# Patient Record
Sex: FEMALE | Race: BLACK OR AFRICAN AMERICAN | Employment: FULL TIME | URBAN - METROPOLITAN AREA
[De-identification: names, ages, dates, MRNs, and addresses within clinical notes are randomized per-mention and may not be internally consistent; named-entity substitution may affect disease eponyms.]

---

## 2017-02-27 ENCOUNTER — HOSPITAL ENCOUNTER (OUTPATIENT)
Dept: LAB | Age: 52
Discharge: HOME | End: 2017-02-27
Payer: COMMERCIAL

## 2017-02-27 LAB
ALBUMIN SERPL BCP-MCNC: 3.8 G/DL (ref 3.4–5)
ALBUMIN/GLOB SERPL: 1.1 {RATIO} (ref 0.8–1.7)
ALP SERPL-CCNC: 104 U/L (ref 45–117)
ALT SERPL-CCNC: 20 U/L (ref 13–56)
ANION GAP BLD CALC-SCNC: 8 MMOL/L (ref 3–18)
AST SERPL W P-5'-P-CCNC: 14 U/L (ref 15–37)
BILIRUB SERPL-MCNC: 0.3 MG/DL (ref 0.2–1)
BUN SERPL-MCNC: 11 MG/DL (ref 7–18)
BUN/CREAT SERPL: 16 (ref 12–20)
CALCIUM SERPL-MCNC: 9.3 MG/DL (ref 8.5–10.1)
CHLORIDE SERPL-SCNC: 105 MMOL/L (ref 100–108)
CK SERPL-CCNC: 127 U/L (ref 26–192)
CO2 SERPL-SCNC: 27 MMOL/L (ref 21–32)
CREAT SERPL-MCNC: 0.68 MG/DL (ref 0.6–1.3)
EST. AVERAGE GLUCOSE BLD GHB EST-MCNC: 126 MG/DL
GLOBULIN SER CALC-MCNC: 3.5 G/DL (ref 2–4)
GLUCOSE SERPL-MCNC: 73 MG/DL (ref 74–99)
HBA1C MFR BLD: 6 % (ref 4.2–5.6)
POTASSIUM SERPL-SCNC: 4.3 MMOL/L (ref 3.5–5.5)
PROT SERPL-MCNC: 7.3 G/DL (ref 6.4–8.2)
SODIUM SERPL-SCNC: 140 MMOL/L (ref 136–145)

## 2017-02-27 PROCEDURE — 82550 ASSAY OF CK (CPK): CPT | Performed by: FAMILY MEDICINE

## 2017-02-27 PROCEDURE — 80053 COMPREHEN METABOLIC PANEL: CPT | Performed by: FAMILY MEDICINE

## 2017-02-27 PROCEDURE — 83036 HEMOGLOBIN GLYCOSYLATED A1C: CPT | Performed by: FAMILY MEDICINE

## 2017-02-27 PROCEDURE — 36415 COLL VENOUS BLD VENIPUNCTURE: CPT | Performed by: FAMILY MEDICINE

## 2017-02-27 PROCEDURE — 83704 LIPOPROTEIN BLD QUAN PART: CPT | Performed by: FAMILY MEDICINE

## 2017-03-01 ENCOUNTER — OFFICE VISIT (OUTPATIENT)
Dept: FAMILY MEDICINE CLINIC | Age: 52
End: 2017-03-01

## 2017-03-01 VITALS
HEIGHT: 62 IN | BODY MASS INDEX: 42.1 KG/M2 | TEMPERATURE: 98.9 F | HEART RATE: 59 BPM | DIASTOLIC BLOOD PRESSURE: 72 MMHG | SYSTOLIC BLOOD PRESSURE: 127 MMHG | RESPIRATION RATE: 20 BRPM | WEIGHT: 228.75 LBS

## 2017-03-01 DIAGNOSIS — E87.6 HYPOKALEMIA: ICD-10-CM

## 2017-03-01 DIAGNOSIS — N39.0 URINARY TRACT INFECTION, SITE UNSPECIFIED: ICD-10-CM

## 2017-03-01 DIAGNOSIS — Z01.419 WELL WOMAN EXAM WITH ROUTINE GYNECOLOGICAL EXAM: Primary | ICD-10-CM

## 2017-03-01 DIAGNOSIS — R73.03 PREDIABETES: ICD-10-CM

## 2017-03-01 DIAGNOSIS — Z51.81 MEDICATION MONITORING ENCOUNTER: ICD-10-CM

## 2017-03-01 DIAGNOSIS — N39.0 URINARY TRACT INFECTION WITHOUT HEMATURIA, SITE UNSPECIFIED: ICD-10-CM

## 2017-03-01 DIAGNOSIS — E55.9 VITAMIN D DEFICIENCY: ICD-10-CM

## 2017-03-01 DIAGNOSIS — Z12.11 SCREEN FOR COLON CANCER: ICD-10-CM

## 2017-03-01 DIAGNOSIS — I10 ESSENTIAL HYPERTENSION: ICD-10-CM

## 2017-03-01 DIAGNOSIS — Z23 ENCOUNTER FOR IMMUNIZATION: ICD-10-CM

## 2017-03-01 DIAGNOSIS — E78.5 HYPERLIPIDEMIA, UNSPECIFIED HYPERLIPIDEMIA TYPE: ICD-10-CM

## 2017-03-01 LAB
BILIRUB UR QL STRIP: NEGATIVE
GLUCOSE UR-MCNC: NEGATIVE MG/DL
HEMOCCULT STL QL: NEGATIVE
KETONES P FAST UR STRIP-MCNC: NEGATIVE MG/DL
PH UR STRIP: 7.5 [PH] (ref 4.6–8)
PROT UR QL STRIP: NORMAL MG/DL
SP GR UR STRIP: 1.01 (ref 1–1.03)
UA UROBILINOGEN AMB POC: NORMAL (ref 0.2–1)
URINALYSIS CLARITY POC: CLEAR
URINALYSIS COLOR POC: YELLOW
URINE BLOOD POC: NORMAL
URINE LEUKOCYTES POC: NORMAL
URINE NITRITES POC: NEGATIVE
VALID INTERNAL CONTROL?: YES

## 2017-03-01 NOTE — PROGRESS NOTES
Chief Complaint   Patient presents with    Well Woman     pap    Results     discuss lab results       Health Maintenance reviewed    1. Have you been to the ER, urgent care clinic since your last visit? Hospitalized since your last visit? Yes When: 12/16 Where: [facility] Reason for visit: N/V    2. Have you seen or consulted any other health care providers outside of the [address] [place] [address] since your last visit? Include any pap smears or colon screening.  No

## 2017-03-01 NOTE — MR AVS SNAPSHOT
Visit Information Date & Time Provider Department Dept. Phone Encounter #  
 3/1/2017  9:30 AM Mark Naranjo MD [address] [id number] Follow-up Instructions Return in about 3 months (around 6/1/2017). Upcoming Health Maintenance Date Due DTaP/Tdap/Td series (1 - Tdap) 1/22/1986 FOBT Q 1 YEAR AGE 50-75 1/22/2015 PAP AKA CERVICAL CYTOLOGY 11/1/2016 BREAST CANCER SCRN MAMMOGRAM 12/5/2018 Allergies as of 3/1/2017  Review Complete On: 3/1/2017 By: Mark Naranjo MD  
  
 Severity Noted Reaction Type Reactions Hydrochlorothiazide   Side Effect Other (comments) TINGLING IN BACK OF HEAD Norvasc [Amlodipine]  05/24/2010    Other (comments) Tingling in back of head Current Immunizations  Reviewed on 11/3/2016 Name Date Influenza Vaccine 11/3/2015 Influenza Vaccine (Quad) PF 11/11/2016 Influenza Vaccine PF 11/22/2013 Influenza Vaccine Split 10/26/2012, 9/26/2011 Not reviewed this visit You Were Diagnosed With   
  
 Codes Comments Well woman exam with routine gynecological exam    -  Primary ICD-10-CM: M94.079 ICD-9-CM: V72.31 Screen for colon cancer     ICD-10-CM: Z12.11 ICD-9-CM: V76.51 Essential hypertension     ICD-10-CM: I10 
ICD-9-CM: 401.9 Vitamin D deficiency     ICD-10-CM: E55.9 ICD-9-CM: 268.9 Prediabetes     ICD-10-CM: R73.03 
ICD-9-CM: 790.29 Hyperlipidemia, unspecified hyperlipidemia type     ICD-10-CM: E78.5 ICD-9-CM: 272.4 Hypokalemia     ICD-10-CM: E87.6 ICD-9-CM: 276.8 Medication monitoring encounter     ICD-10-CM: Z51.81 
ICD-9-CM: V58.83 Vitals BP  
  
  
  
  
  
 127/72 (BP 1 Location: Left arm, BP Patient Position: Sitting) BMI and BSA Data Body Mass Index Body Surface Area  
 41.84 kg/m 2 2.13 m 2 Preferred Pharmacy Pharmacy Name Phone [id number], [address] [facility] [facility] [phone] Your Updated Medication List  
  
   
This list is accurate as of: 3/1/17 10:43 AM.  Always use your most recent med list.  
  
  
  
  
 citalopram 10 mg tablet Commonly known as:  Ira Claudy Take 0.5 Tabs by mouth daily. CITRACAL + D PO Take  by mouth daily. losartan 100 mg tablet Commonly known as:  COZAAR  
TAKE 1 TAB BY MOUTH DAILY. MULTIVITAMIN PO Take 1 Tab by mouth daily. pravastatin 40 mg tablet Commonly known as:  PRAVACHOL  
TAKE 1 TAB BY MOUTH NIGHTLY. VITAMIN D3 2,000 unit Cap capsule Generic drug:  Cholecalciferol (Vitamin D3) 1 cap daily We Performed the Following AMB POC FECAL BLOOD, OCCULT, QL 1 CARD [91296 CPT(R)] Follow-up Instructions Return in about 3 months (around 6/1/2017). To-Do List   
 03/01/2017 Lab:  OCCULT BLOOD, IMMUNOASSAY (FIT)   
  
 03/01/2017 Pathology:  PAP, IG, RFX HPV ASCUS ([id number])   
  
 06/01/2017 Lab:  CBC WITH AUTOMATED DIFF   
  
 06/01/2017 Lab:  HEMOGLOBIN A1C W/O EAG   
  
 06/01/2017 Lab:  LIPID PANEL   
  
 06/01/2017 Lab:  MAGNESIUM   
  
 06/01/2017 Lab:  METABOLIC PANEL, COMPREHENSIVE   
  
 06/01/2017 Lab:  TSH 3RD GENERATION   
  
 06/01/2017 Lab:  VITAMIN B12   
  
 06/01/2017 Lab:  VITAMIN D, 25 HYDROXY Patient Instructions Please contact our office if you have any questions about your visit today. Introducing [facility]! Dear Moy Phillip: 
Thank you for requesting a [employer] account. Our records indicate that you have previously registered for a [employer] account but its currently inactive. Please call our [employer] support line at [phone]. Additional Information If you have questions, please visit the Frequently Asked Questions section of the [employer] website at [email or web address] Remember, [employer] is NOT to be used for urgent needs. For medical emergencies, dial 911. Now available from your iPhone and Android! Please provide this summary of care documentation to your next provider. Your primary care clinician is listed as CHRISTINE DE LUNA. If you have any questions after today's visit, please call [phone].

## 2017-03-01 NOTE — PROGRESS NOTES
HISTORY OF PRESENT ILLNESS  Josef Guardado is a 46 y.o. female. Chief Complaint   Patient presents with             Results     discuss lab results   follow up htn chronic problem, stable  follow up prediabetes chronic problem, stable no meds on diet for this   follow up hyperlipidemia chronic problem, stable   Also pt went to ED with uti and was treated, elissas inocencio  HPI  Past Medical History:   Diagnosis Date    Allergy     Anxiety 4/24/2010    Essential hypertension     HTN (hypertension) 4/24/2010    Hyperlipemia 4/24/2010    Hypokalemia 4/24/2010    Scoliosis      Current Outpatient Prescriptions   Medication Sig Dispense Refill    pravastatin (PRAVACHOL) 40 mg tablet TAKE 1 TAB BY MOUTH NIGHTLY. 90 Tab 3    citalopram (CELEXA) 10 mg tablet Take 0.5 Tabs by mouth daily. 45 Tab 3    losartan (COZAAR) 100 mg tablet TAKE 1 TAB BY MOUTH DAILY. 90 Tab 3    Cholecalciferol, Vitamin D3, (VITAMIN D3) 2,000 unit cap 1 cap daily   30 Cap prn    CALCIUM CITRATE/VITAMIN D3 (CITRACAL + D PO) Take  by mouth daily.  MULTIVITAMINS (MULTIVITAMIN PO) Take 1 Tab by mouth daily. Allergies   Allergen Reactions    Hydrochlorothiazide Other (comments)     TINGLING IN BACK OF HEAD    Norvasc [Amlodipine] Other (comments)     Tingling in back of head       ROS Respiratory: Negative for shortness of breath. Cardiovascular: Negative for chest pain. Genitourinary: Negative for frequency. Neurological: Negative for dizziness and headaches. Visit Vitals    /72 (BP 1 Location: Left arm, BP Patient Position: Sitting)    Pulse (!) 59    Temp 98.9 °F (37.2 °C) (Oral)    Resp 20    Ht 5' 2\" (1.575 m)    Wt 228 lb 12 oz (103.8 kg)    BMI 41.84 kg/m2       Physical Exam  Nursing note and vitals reviewed. Constitutional: She is oriented to person, place, and time. She appears well-developed and well-nourished. No distress.    HENT:   Mouth/Throat: Oropharynx is clear and moist.   Neck: No JVD present. No thyromegaly present. Cardiovascular: Normal rate, regular rhythm and normal heart sounds. Pulmonary/Chest: Effort normal and breath sounds normal. No respiratory distress. She has no wheezes. She has no rales. Musculoskeletal: She exhibits no edema. Lymphadenopathy:     She has no cervical adenopathy. Neurological: She is alert and oriented to person, place, and time. Coordination normal.   Psychiatric: She has a normal mood and affect. Her behavior is normal.    Results for orders placed or performed during the hospital encounter of 02/27/17   CK   Result Value Ref Range     26 - 657 U/L   METABOLIC PANEL, COMPREHENSIVE   Result Value Ref Range    Sodium 140 136 - 145 mmol/L    Potassium 4.3 3.5 - 5.5 mmol/L    Chloride 105 100 - 108 mmol/L    CO2 27 21 - 32 mmol/L    Anion gap 8 3.0 - 18 mmol/L    Glucose 73 (L) 74 - 99 mg/dL    BUN 11 7.0 - 18 MG/DL    Creatinine 0.68 0.6 - 1.3 MG/DL    BUN/Creatinine ratio 16 12 - 20      GFR est AA >60 >60 ml/min/1.73m2    GFR est non-AA >60 >60 ml/min/1.73m2    Calcium 9.3 8.5 - 10.1 MG/DL    Bilirubin, total 0.3 0.2 - 1.0 MG/DL    ALT (SGPT) 20 13 - 56 U/L    AST (SGOT) 14 (L) 15 - 37 U/L    Alk.  phosphatase 104 45 - 117 U/L    Protein, total 7.3 6.4 - 8.2 g/dL    Albumin 3.8 3.4 - 5.0 g/dL    Globulin 3.5 2.0 - 4.0 g/dL    A-G Ratio 1.1 0.8 - 1.7     HEMOGLOBIN A1C   Result Value Ref Range    Hemoglobin A1c 6.0 (H) 4.2 - 5.6 %    Est. average glucose 126 mg/dL       ASSESSMENT and PLAN    ICD-10-CM ICD-9-CM                              3. Essential hypertension stable continue current medications  T09 148.9 METABOLIC PANEL, COMPREHENSIVE      CBC WITH AUTOMATED DIFF      VITAMIN B12      TSH 3RD GENERATION      MAGNESIUM   4. Vitamin D deficiency Check labs on follow up   J92.4 433.2 METABOLIC PANEL, COMPREHENSIVE      CBC WITH AUTOMATED DIFF      VITAMIN B12      TSH 3RD GENERATION      MAGNESIUM      VITAMIN D, 25 HYDROXY   5. Prediabetes Stable, continue current care. Check labs on follow up  Dm risk B28.57 951.37 METABOLIC PANEL, COMPREHENSIVE      CBC WITH AUTOMATED DIFF      VITAMIN B12      TSH 3RD GENERATION      MAGNESIUM      HEMOGLOBIN A1C W/O EAG   6. Hyperlipidemia, unspecified hyperlipidemia type Check labs on follow up   E78.5 272.4 LIPID PANEL      METABOLIC PANEL, COMPREHENSIVE      CBC WITH AUTOMATED DIFF      VITAMIN B12      TSH 3RD GENERATION      MAGNESIUM   7. Hypokalemia stable continue current medications  K58.9 401.3 METABOLIC PANEL, COMPREHENSIVE      CBC WITH AUTOMATED DIFF      VITAMIN B12      TSH 3RD GENERATION      MAGNESIUM   8. Medication monitoring encounter Z51.81 V58.83 LIPID PANEL      METABOLIC PANEL, COMPREHENSIVE      CBC WITH AUTOMATED DIFF      VITAMIN B12      TSH 3RD GENERATION      MAGNESIUM      VITAMIN D, 25 HYDROXY      HEMOGLOBIN A1C W/O EAG     Subjective:   46 y.o. female for Well Woman Check. No LMP recorded. Patient has had a hysterectomy. Social History: . Pertinent past medical hstory: . Current Outpatient Prescriptions   Medication Sig Dispense Refill    pravastatin (PRAVACHOL) 40 mg tablet TAKE 1 TAB BY MOUTH NIGHTLY. 90 Tab 3    citalopram (CELEXA) 10 mg tablet Take 0.5 Tabs by mouth daily. 45 Tab 3    losartan (COZAAR) 100 mg tablet TAKE 1 TAB BY MOUTH DAILY. 90 Tab 3    Cholecalciferol, Vitamin D3, (VITAMIN D3) 2,000 unit cap 1 cap daily   30 Cap prn    CALCIUM CITRATE/VITAMIN D3 (CITRACAL + D PO) Take  by mouth daily.  MULTIVITAMINS (MULTIVITAMIN PO) Take 1 Tab by mouth daily.        Allergies   Allergen Reactions    Hydrochlorothiazide Other (comments)     TINGLING IN BACK OF HEAD    Norvasc [Amlodipine] Other (comments)     Tingling in back of head     Past Medical History:   Diagnosis Date    Allergy     Anxiety 4/24/2010    Essential hypertension     HTN (hypertension) 4/24/2010    Hyperlipemia 4/24/2010    Hypokalemia 4/24/2010    Scoliosis      Past Surgical History:   Procedure Laterality Date    HX APPENDECTOMY  1979    HX HYSTERECTOMY  05/07/08    partial     Family History   Problem Relation Age of Onset    Hypertension Mother     Glaucoma Mother     Hypertension Father     Stroke Brother     Heart Disease Maternal Aunt      chf    Heart Failure Maternal Aunt      MI    Diabetes Maternal Uncle     Heart Failure Other      MI    Stroke Other     Colon Cancer Other      Social History   Substance Use Topics    Smoking status: Never Smoker    Smokeless tobacco: Never Used    Alcohol use No        ROS:  Feeling well. No dyspnea or chest pain on exertion. No abdominal pain, change in bowel habits, black or bloody stools. No urinary tract symptoms. GYN ROS: no breast pain or new or enlarging lumps on self exam. No neurological complaints. Objective:     Visit Vitals    /72 (BP 1 Location: Left arm, BP Patient Position: Sitting)    Pulse (!) 59    Temp 98.9 °F (37.2 °C) (Oral)    Resp 20    Ht 5' 2\" (1.575 m)    Wt 228 lb 12 oz (103.8 kg)    BMI 41.84 kg/m2     The patient appears well, alert, oriented x 3, in no distress. ENT normal.  Neck supple. No adenopathy or thyromegaly. RHONDA. Lungs are clear, good air entry, no wheezes, rhonchi or rales. S1 and S2 normal, no murmurs, regular rate and rhythm. Abdomen soft without tenderness, guarding, mass or organomegaly. Extremities show no edema, normal peripheral pulses. Neurological is normal, no focal findings.     BREAST EXAM: breasts appear normal, no suspicious masses, no skin or nipple changes or axillary nodes    PELVIC EXAM: VULVA: normal appearing vulva with no masses, tenderness or lesions, VAGINA: normal appearing vagina with normal color and discharge, no lesions, CERVIX: surgically absent, UTERUS: surgically absent, vaginal cuff well healed, RECTAL: normal rectal, no masses, guaiac negative stool obtained, PAP: Pap smear done today    Assessment/Plan:   well woman  mammogram  pap smear    ICD-10-CM ICD-9-CM    1. Well woman exam with routine gynecological exam   S/p hys no cervix, no cancer hx, no  Need for further pap Z01.419 V72.31 PAP, IG, RFX HPV ASCUS ([id number])      AMB POC FECAL BLOOD, OCCULT, QL 1 CARD   2.  Screen for colon cancer Z12.11 V76.51 AMB POC FECAL BLOOD, OCCULT, QL 1 CARD      OCCULT BLOOD, IMMUNOASSAY (FIT)

## 2017-03-04 LAB — BACTERIA UR CULT: NORMAL

## 2017-03-07 ENCOUNTER — TELEPHONE (OUTPATIENT)
Dept: FAMILY MEDICINE CLINIC | Age: 52
End: 2017-03-07

## 2017-03-07 LAB
HPV I/H RISK 1 DNA CVX QL PROBE+SIG AMP: NORMAL
HPV I/H RISK 4 DNA CVX QL PROBE+SIG AMP: NEGATIVE

## 2017-06-02 ENCOUNTER — DOCUMENTATION ONLY (OUTPATIENT)
Dept: NEUROLOGY | Age: 52
End: 2017-06-02

## 2017-06-02 ENCOUNTER — TELEPHONE (OUTPATIENT)
Dept: NEUROLOGY | Age: 52
End: 2017-06-02

## 2017-06-02 NOTE — TELEPHONE ENCOUNTER
Pt is asking if we know of any way she can obtain a CPAP machine interim of getting established with new [employer] since she has changed insurances and had to give back CPAP to [facility].

## 2017-06-21 RX ORDER — LOSARTAN POTASSIUM 100 MG/1
TABLET ORAL
Qty: 90 TAB | Refills: 3 | Status: SHIPPED | OUTPATIENT
Start: 2017-06-21 | End: 2018-06-21 | Stop reason: SDUPTHER

## 2017-07-05 ENCOUNTER — HOSPITAL ENCOUNTER (OUTPATIENT)
Dept: LAB | Age: 52
Discharge: HOME | End: 2017-07-05
Payer: COMMERCIAL

## 2017-07-05 LAB
25(OH)D3 SERPL-MCNC: 31.4 NG/ML (ref 30–100)
ALBUMIN SERPL BCP-MCNC: 3.7 G/DL (ref 3.4–5)
ALBUMIN/GLOB SERPL: 1.2 {RATIO} (ref 0.8–1.7)
ALP SERPL-CCNC: 102 U/L (ref 45–117)
ALT SERPL-CCNC: 21 U/L (ref 13–56)
ANION GAP BLD CALC-SCNC: 7 MMOL/L (ref 3–18)
AST SERPL W P-5'-P-CCNC: 14 U/L (ref 15–37)
BASOPHILS # BLD AUTO: 0 K/UL (ref 0–0.1)
BASOPHILS # BLD: 0 % (ref 0–2)
BILIRUB SERPL-MCNC: 0.2 MG/DL (ref 0.2–1)
BUN SERPL-MCNC: 14 MG/DL (ref 7–18)
BUN/CREAT SERPL: 21 (ref 12–20)
CALCIUM SERPL-MCNC: 9.2 MG/DL (ref 8.5–10.1)
CHLORIDE SERPL-SCNC: 103 MMOL/L (ref 100–108)
CHOLEST SERPL-MCNC: 179 MG/DL
CO2 SERPL-SCNC: 29 MMOL/L (ref 21–32)
CREAT SERPL-MCNC: 0.66 MG/DL (ref 0.6–1.3)
DIFFERENTIAL METHOD BLD: ABNORMAL
EOSINOPHIL # BLD: 0.2 K/UL (ref 0–0.4)
EOSINOPHIL NFR BLD: 3 % (ref 0–5)
ERYTHROCYTE [DISTWIDTH] IN BLOOD BY AUTOMATED COUNT: 13.7 % (ref 11.6–14.5)
GLOBULIN SER CALC-MCNC: 3.2 G/DL (ref 2–4)
GLUCOSE SERPL-MCNC: 78 MG/DL (ref 74–99)
HBA1C MFR BLD: 6.7 % (ref 4.2–5.6)
HCT VFR BLD AUTO: 37.5 % (ref 35–45)
HDLC SERPL-MCNC: 64 MG/DL (ref 40–60)
HDLC SERPL: 2.8 {RATIO} (ref 0–5)
HGB BLD-MCNC: 11.9 G/DL (ref 12–16)
LDLC SERPL CALC-MCNC: 101.6 MG/DL (ref 0–100)
LIPID PROFILE,FLP: ABNORMAL
LYMPHOCYTES # BLD AUTO: 54 % (ref 21–52)
LYMPHOCYTES # BLD: 3 K/UL (ref 0.9–3.6)
MAGNESIUM SERPL-MCNC: 2.2 MG/DL (ref 1.6–2.6)
MCH RBC QN AUTO: 26.1 PG (ref 24–34)
MCHC RBC AUTO-ENTMCNC: 31.7 G/DL (ref 31–37)
MCV RBC AUTO: 82.2 FL (ref 74–97)
MONOCYTES # BLD: 0.5 K/UL (ref 0.05–1.2)
MONOCYTES NFR BLD AUTO: 8 % (ref 3–10)
NEUTS SEG # BLD: 2 K/UL (ref 1.8–8)
NEUTS SEG NFR BLD AUTO: 35 % (ref 40–73)
PLATELET # BLD AUTO: 315 K/UL (ref 135–420)
PMV BLD AUTO: 10.1 FL (ref 9.2–11.8)
POTASSIUM SERPL-SCNC: 4.6 MMOL/L (ref 3.5–5.5)
PROT SERPL-MCNC: 6.9 G/DL (ref 6.4–8.2)
RBC # BLD AUTO: 4.56 M/UL (ref 4.2–5.3)
SODIUM SERPL-SCNC: 139 MMOL/L (ref 136–145)
TRIGL SERPL-MCNC: 67 MG/DL (ref ?–150)
TSH SERPL DL<=0.05 MIU/L-ACNC: 1.77 UIU/ML (ref 0.36–3.74)
VIT B12 SERPL-MCNC: 576 PG/ML (ref 211–911)
VLDLC SERPL CALC-MCNC: 13.4 MG/DL
WBC # BLD AUTO: 5.6 K/UL (ref 4.6–13.2)

## 2017-07-05 PROCEDURE — 36415 COLL VENOUS BLD VENIPUNCTURE: CPT | Performed by: FAMILY MEDICINE

## 2017-07-05 PROCEDURE — 83735 ASSAY OF MAGNESIUM: CPT | Performed by: FAMILY MEDICINE

## 2017-07-05 PROCEDURE — 82607 VITAMIN B-12: CPT | Performed by: FAMILY MEDICINE

## 2017-07-05 PROCEDURE — 84443 ASSAY THYROID STIM HORMONE: CPT | Performed by: FAMILY MEDICINE

## 2017-07-05 PROCEDURE — 80061 LIPID PANEL: CPT | Performed by: FAMILY MEDICINE

## 2017-07-05 PROCEDURE — 83036 HEMOGLOBIN GLYCOSYLATED A1C: CPT | Performed by: FAMILY MEDICINE

## 2017-07-05 PROCEDURE — 85025 COMPLETE CBC W/AUTO DIFF WBC: CPT | Performed by: FAMILY MEDICINE

## 2017-07-05 PROCEDURE — 80053 COMPREHEN METABOLIC PANEL: CPT | Performed by: FAMILY MEDICINE

## 2017-07-05 PROCEDURE — 82306 VITAMIN D 25 HYDROXY: CPT | Performed by: FAMILY MEDICINE

## 2017-07-07 ENCOUNTER — OFFICE VISIT (OUTPATIENT)
Dept: FAMILY MEDICINE CLINIC | Age: 52
End: 2017-07-07

## 2017-07-07 VITALS
HEART RATE: 66 BPM | DIASTOLIC BLOOD PRESSURE: 70 MMHG | HEIGHT: 62 IN | BODY MASS INDEX: 44.16 KG/M2 | WEIGHT: 240 LBS | SYSTOLIC BLOOD PRESSURE: 123 MMHG | RESPIRATION RATE: 20 BRPM | TEMPERATURE: 98.5 F

## 2017-07-07 DIAGNOSIS — Z11.4 SCREENING FOR HIV (HUMAN IMMUNODEFICIENCY VIRUS): ICD-10-CM

## 2017-07-07 DIAGNOSIS — Z99.89 OSA ON CPAP: ICD-10-CM

## 2017-07-07 DIAGNOSIS — N95.1 HOT FLASHES, MENOPAUSAL: ICD-10-CM

## 2017-07-07 DIAGNOSIS — I10 ESSENTIAL HYPERTENSION: ICD-10-CM

## 2017-07-07 DIAGNOSIS — Z51.81 MEDICATION MONITORING ENCOUNTER: ICD-10-CM

## 2017-07-07 DIAGNOSIS — G47.33 OSA ON CPAP: ICD-10-CM

## 2017-07-07 DIAGNOSIS — E78.5 HYPERLIPIDEMIA, UNSPECIFIED HYPERLIPIDEMIA TYPE: Primary | ICD-10-CM

## 2017-07-07 DIAGNOSIS — R73.03 PREDIABETES: ICD-10-CM

## 2017-07-07 NOTE — PROGRESS NOTES
HISTORY OF PRESENT ILLNESS  Ayse Raines is a 46 y.o. female. Chief Complaint   Patient presents with    Hypertension chronic problem, stable Reports compliance with meds Asymptomatic, no headache or dizziness.  Vitamin D Deficiency    Blood sugar problem     Prediabetes chronic problem, stable no meds on diet for this     Cholesterol Problem chronic problem, stable      high chol    Results     discuss lab results       HPI  Past Medical History:   Diagnosis Date    Allergy     Anxiety 4/24/2010    Essential hypertension     HTN (hypertension) 4/24/2010    Hyperlipemia 4/24/2010    Hypokalemia 4/24/2010    Scoliosis      . Past Medical History:   Diagnosis Date    Allergy     Anxiety 4/24/2010    Essential hypertension     HTN (hypertension) 4/24/2010    Hyperlipemia 4/24/2010    Hypokalemia 4/24/2010    Scoliosis      Current Outpatient Prescriptions   Medication Sig Dispense Refill    losartan (COZAAR) 100 mg tablet TAKE 1 TAB BY MOUTH DAILY. 90 Tab 3    pravastatin (PRAVACHOL) 40 mg tablet TAKE 1 TAB BY MOUTH NIGHTLY. 90 Tab 3    citalopram (CELEXA) 10 mg tablet Take 0.5 Tabs by mouth daily. 45 Tab 3    Cholecalciferol, Vitamin D3, (VITAMIN D3) 2,000 unit cap 1 cap daily   30 Cap prn    CALCIUM CITRATE/VITAMIN D3 (CITRACAL + D PO) Take  by mouth daily.  MULTIVITAMINS (MULTIVITAMIN PO) Take 1 Tab by mouth daily. Allergies   Allergen Reactions    Hydrochlorothiazide Other (comments)     TINGLING IN BACK OF HEAD    Norvasc [Amlodipine] Other (comments)     Tingling in back of head       ROS Respiratory: Negative for shortness of breath. Cardiovascular: Negative for chest pain. Genitourinary: Negative for frequency. Neurological: Negative for dizziness and headaches.      Visit Vitals    /70 (BP 1 Location: Left arm, BP Patient Position: Sitting)    Pulse 66    Temp 98.5 °F (36.9 °C) (Oral)    Resp 20    Ht 5' 2\" (1.575 m)    Wt 240 lb (108.9 kg)  BMI 43.9 kg/m2       Physical Exam  Nursing note and vitals reviewed. Constitutional: She is oriented to person, place, and time. She appears well-developed and well-nourished. No distress. HENT:   Mouth/Throat: Oropharynx is clear and moist.   Neck: No JVD present. No thyromegaly present. Cardiovascular: Normal rate, regular rhythm and normal heart sounds. Pulmonary/Chest: Effort normal and breath sounds normal. No respiratory distress. She has no wheezes. She has no rales. Musculoskeletal: She exhibits no edema. Lymphadenopathy:     She has no cervical adenopathy. Neurological: She is alert and oriented to person, place, and time. Coordination normal.   Psychiatric: She has a normal mood and affect. Her behavior is normal.   Results for orders placed or performed during the hospital encounter of 07/05/17   VITAMIN B12   Result Value Ref Range    Vitamin B12 576 211 - 911 pg/mL   CBC WITH AUTOMATED DIFF   Result Value Ref Range    WBC 5.6 4.6 - 13.2 K/uL    RBC 4.56 4.20 - 5.30 M/uL    HGB 11.9 (L) 12.0 - 16.0 g/dL    HCT 37.5 35.0 - 45.0 %    MCV 82.2 74.0 - 97.0 FL    MCH 26.1 24.0 - 34.0 PG    MCHC 31.7 31.0 - 37.0 g/dL    RDW 13.7 11.6 - 14.5 %    PLATELET 154 094 - 365 K/uL    MPV 10.1 9.2 - 11.8 FL    NEUTROPHILS 35 (L) 40 - 73 %    LYMPHOCYTES 54 (H) 21 - 52 %    MONOCYTES 8 3 - 10 %    EOSINOPHILS 3 0 - 5 %    BASOPHILS 0 0 - 2 %    ABS. NEUTROPHILS 2.0 1.8 - 8.0 K/UL    ABS. LYMPHOCYTES 3.0 0.9 - 3.6 K/UL    ABS. MONOCYTES 0.5 0.05 - 1.2 K/UL    ABS. EOSINOPHILS 0.2 0.0 - 0.4 K/UL    ABS.  BASOPHILS 0.0 0.0 - 0.1 K/UL    DF AUTOMATED     LIPID PANEL   Result Value Ref Range    LIPID PROFILE          Cholesterol, total 179 <200 MG/DL    Triglyceride 67 <150 MG/DL    HDL Cholesterol 64 (H) 40 - 60 MG/DL    LDL, calculated 101.6 (H) 0 - 100 MG/DL    VLDL, calculated 13.4 MG/DL    CHOL/HDL Ratio 2.8 0 - 5.0     MAGNESIUM   Result Value Ref Range    Magnesium 2.2 1.6 - 2.6 mg/dL   METABOLIC PANEL, COMPREHENSIVE   Result Value Ref Range    Sodium 139 136 - 145 mmol/L    Potassium 4.6 3.5 - 5.5 mmol/L    Chloride 103 100 - 108 mmol/L    CO2 29 21 - 32 mmol/L    Anion gap 7 3.0 - 18 mmol/L    Glucose 78 74 - 99 mg/dL    BUN 14 7.0 - 18 MG/DL    Creatinine 0.66 0.6 - 1.3 MG/DL    BUN/Creatinine ratio 21 (H) 12 - 20      GFR est AA >60 >60 ml/min/1.73m2    GFR est non-AA >60 >60 ml/min/1.73m2    Calcium 9.2 8.5 - 10.1 MG/DL    Bilirubin, total 0.2 0.2 - 1.0 MG/DL    ALT (SGPT) 21 13 - 56 U/L    AST (SGOT) 14 (L) 15 - 37 U/L    Alk. phosphatase 102 45 - 117 U/L    Protein, total 6.9 6.4 - 8.2 g/dL    Albumin 3.7 3.4 - 5.0 g/dL    Globulin 3.2 2.0 - 4.0 g/dL    A-G Ratio 1.2 0.8 - 1.7     TSH 3RD GENERATION   Result Value Ref Range    TSH 1.77 0.36 - 3.74 uIU/mL   VITAMIN D, 25 HYDROXY   Result Value Ref Range    Vitamin D 25-Hydroxy 31.4 30 - 100 ng/mL   HEMOGLOBIN A1C W/O EAG   Result Value Ref Range    Hemoglobin A1c 6.7 (H) 4.2 - 5.6 %       ASSESSMENT and PLAN    ICD-10-CM ICD-9-CM    1. Hyperlipidemia, unspecified hyperlipidemia type stable continue current medications  Check labs on follow up    Y80.9 539.6 METABOLIC PANEL, COMPREHENSIVE      LIPID PANEL   2. Prediabetes dm risk increased work on diet Check labs on follow up   R06.90 283.80 METABOLIC PANEL, COMPREHENSIVE      HEMOGLOBIN A1C W/O EAG   3. Essential hypertension stable continue current medications  W15 185.5 METABOLIC PANEL, COMPREHENSIVE   4. Screening for HIV (human immunodeficiency virus) Z11.4 V73.89    5. Hot flashes, menopausal N95.1 627.2    6. SEFERINO on CPAP G47.33 327.23     Z99.89 V46.8    7. Medication monitoring encounter A22.52 D56.87 METABOLIC PANEL, COMPREHENSIVE      LIPID PANEL      HEMOGLOBIN A1C W/O EAG   Follow-up Disposition:  Return in about 3 months (around 10/7/2017).

## 2017-07-07 NOTE — PROGRESS NOTES
Chief Complaint   Patient presents with    Hypertension    Vitamin D Deficiency    Blood sugar problem     prediabetes    Cholesterol Problem     high chol    Results     discuss lab results       Health Maintenance Due   Topic Date Due    FOBT Q 1 YEAR AGE 50-75  01/22/2015       Health Maintenance reviewed     1. Have you been to the ER, urgent care clinic since your last visit? Hospitalized since your last visit? No    2. Have you seen or consulted any other health care providers outside of the [address] [place] [address] since your last visit? Include any pap smears or colon screening.  No

## 2017-07-07 NOTE — MR AVS SNAPSHOT
Visit Information Date & Time Provider Department Dept. Phone Encounter #  
 7/7/2017  3:00 PM Yamileth Cat MD [facility] [phone] [id number] Follow-up Instructions Return in about 3 months (around 10/7/2017). Your Appointments 7/17/2017  3:00 PM  
Follow Up with Tory Toure MD  
[facility] [address] Appt Note: follow up to obtain new CPAP machine due to insurance change  Would like to switch DME companoes [address] [facility] [address] [phone]  
  
   
 [facility] [address] Upcoming Health Maintenance Date Due FOBT Q 1 YEAR AGE 50-75 1/22/2015 INFLUENZA AGE 9 TO ADULT 8/1/2017 BREAST CANCER SCRN MAMMOGRAM 12/5/2018 PAP AKA CERVICAL CYTOLOGY 3/1/2020 DTaP/Tdap/Td series (2 - Td) 3/1/2027 Allergies as of 7/7/2017  Review Complete On: 7/7/2017 By: Yamileth Cat MD  
  
 Severity Noted Reaction Type Reactions Hydrochlorothiazide   Side Effect Other (comments) TINGLING IN BACK OF HEAD Norvasc [Amlodipine]  05/24/2010    Other (comments) Tingling in back of head Current Immunizations  Reviewed on 11/3/2016 Name Date Influenza Vaccine 11/3/2015 Influenza Vaccine (Quad) PF 11/11/2016 Influenza Vaccine PF 11/22/2013 Influenza Vaccine Split 10/26/2012, 9/26/2011 Tdap 3/1/2017 Not reviewed this visit You Were Diagnosed With   
  
 Codes Comments Hyperlipidemia, unspecified hyperlipidemia type    -  Primary ICD-10-CM: E78.5 ICD-9-CM: 272.4 Prediabetes     ICD-10-CM: R73.03 
ICD-9-CM: 790.29 Essential hypertension     ICD-10-CM: I10 
ICD-9-CM: 401.9 Screening for HIV (human immunodeficiency virus)     ICD-10-CM: Z11.4 ICD-9-CM: V73.89 Hot flashes, menopausal     ICD-10-CM: N95.1 ICD-9-CM: 627.2 SEFERINO on CPAP     ICD-10-CM: G47.33, Z99.89 ICD-9-CM: 327.23, V46.8 Medication monitoring encounter     ICD-10-CM: Z51.81 
ICD-9-CM: V58.83 Vitals BP Pulse Temp Resp Height(growth percentile) Weight(growth percentile) 123/70 (BP 1 Location: Left arm, BP Patient Position: Sitting) 66 98.5 °F (36.9 °C) (Oral) 20 5' 2\" (1.575 m) 240 lb (108.9 kg) BMI OB Status Smoking Status 43.9 kg/m2 Hysterectomy Never Smoker BMI and BSA Data Body Mass Index Body Surface Area 43.9 kg/m 2 2.18 m 2 Preferred Pharmacy Pharmacy Name Phone [id number] Antonia Greco, [address] [facility] [facility] [phone] Your Updated Medication List  
  
   
This list is accurate as of: 7/7/17  4:22 PM.  Always use your most recent med list.  
  
  
  
  
 citalopram 10 mg tablet Commonly known as:  Vivica Sotomayor Take 0.5 Tabs by mouth daily. CITRACAL + D PO Take  by mouth daily. losartan 100 mg tablet Commonly known as:  COZAAR  
TAKE 1 TAB BY MOUTH DAILY. MULTIVITAMIN PO Take 1 Tab by mouth daily. pravastatin 40 mg tablet Commonly known as:  PRAVACHOL  
TAKE 1 TAB BY MOUTH NIGHTLY. VITAMIN D3 2,000 unit Cap capsule Generic drug:  Cholecalciferol (Vitamin D3) 1 cap daily Follow-up Instructions Return in about 3 months (around 10/7/2017). To-Do List   
 10/07/2017 Lab:  HEMOGLOBIN A1C W/O EAG   
  
 10/07/2017 Lab:  LIPID PANEL   
  
 10/07/2017 Lab:  METABOLIC PANEL, COMPREHENSIVE Patient Instructions Please contact our office if you have any questions about your visit today. Menopause Diet: Care Instructions Your Care Instructions Healthy eating helps ease menopause symptoms. And it can reduce your risk for getting conditions such as osteoporosis and heart disease. Follow-up care is a key part of your treatment and safety.  Be sure to make and go to all appointments, and call your doctor if you are having problems. It's also a good idea to know your test results and keep a list of the medicines you take. How can you care for yourself at home? · Limit fats in your diet. · Choose foods that have a lot of calcium. The recommended daily intake for adults ages 23 to 48 is 1,000 milligrams (mg). Adults over 50 need 1,200 mg a day. Take a calcium supplement if you don't get enough calcium in the foods you eat. · Add vitamin D to your daily diet. It helps your body use calcium. The recommended daily intake of vitamin D is 600 international units (IU) a day for children and adults up to age 79. Adults age 70 and older need 800 IU a day. Take vitamin D supplements if you need to. · Include good sources of fiber in your diet each day. These include whole grains, beans, fruits, and vegetables. · Avoid simple sugars. This helps if you have mood swings, anxiety, or depression. · Avoid caffeine, or cut back on it. Caffeine can cause sleep problems. It can also make you feel anxious. To relieve these symptoms, pay attention to how much caffeine you are getting in drinks and chocolate. · Limit your intake of alcohol. Heavy drinking tends to make symptoms worse. Where can you learn more? Go to http://flo-hari.info/. Enter Q394 in the search box to learn more about \"Menopause Diet: Care Instructions. \" Current as of: July 26, 2016 Content Version: 11.3 © 7193-3673 [employer]mba[employer]. Care instructions adapted under license by [employer] (which disclaims liability or warranty for this information). If you have questions about a medical condition or this instruction, always ask your healthcare professional. [place] [address] any warranty or liability for your use of this information. Hot Flashes During Menopause: Care Instructions Your Care Instructions A hot flash is a sudden feeling of intense body heat.  Your head, neck, and chest may get red. Your heartbeat may speed up, and you may feel anxious or irritable. You may find that hot flashes occur more often in warm rooms or during stressful times. Hot flashes and other symptoms are a normal response to the hormone changes that occur before your menstrual cycle goes away completely (menopause). Hot flashes often get better and go away with time. Making a few changes, such as exercising more, practicing meditation, quitting smoking, and drinking less alcohol, can help. Some women take hormone pills or other medicine to treat bothersome symptoms. Follow-up care is a key part of your treatment and safety. Be sure to make and go to all appointments, and call your doctor if you are having problems. Its also a good idea to know your test results and keep a list of the medicines you take. How can you care for yourself at home? · If you decide to take medicine to treat hot flashes, take it exactly as prescribed. Call your doctor if you think you are having a problem with your medicine. You will get more details on the specific medicine your doctor prescribes. · Learn to meditate. Sit quietly and focus on your breathing. Try to practice each day. Books, classes, and tapes can help you start a program. 
· Wear natural fabrics, such as cotton and silk. Dress in layers so you can take off clothes as needed. · Keep the room temperature cool, or use a fan. You are more likely to have a hot flash when you are too warm than when you are cool. · Use fewer blankets when you sleep at night. · Drink cold fluids rather than hot ones. Limit your intake of caffeine and alcohol. · Eat smaller meals more often during the day so your body makes less heat than when digesting large amounts of food. Eat low-fat and high-fiber foods. · Do not smoke. Smoking can make hot flashes worse. If you need help quitting, talk to your doctor about stop-smoking programs and medicines. These can increase your chances of quitting for good. · Get at least 30 minutes of exercise on most days of the week. Walking is a good choice. You also may want to do other activities, such as running, swimming, cycling, or playing tennis or team sports. Where can you learn more? Go to http://flo-hari.info/. Enter F700 in the search box to learn more about \"Hot Flashes During Menopause: Care Instructions. \" Current as of: October 13, 2016 Content Version: 11.3 © 5531-7652 Blaze.io. Care instructions adapted under license by [employer] (which disclaims liability or warranty for this information). If you have questions about a medical condition or this instruction, always ask your healthcare professional. [address] any warranty or liability for your use of this information. Introducing \[facility]\"" & HEALTH SERVICES! Veronica Dodge introduces [employer] patient portal. Now you can access parts of your medical record, email your doctor's office, and request medication refills online. 1. In your internet browser, go to [email or web address] 2. Click on the First Time User? Click Here link in the Sign In box. You will see the New Member Sign Up page. 3. Enter your [employer] Access Code exactly as it appears below. You will not need to use this code after youve completed the sign-up process. If you do not sign up before the expiration date, you must request a new code. · [employer] Access Code: [email or web address] Expires: 10/3/2017  3:56 PM 
 
4. Enter the last four digits of your Social Security Number (xxxx) and Date of Birth (mm/dd/yyyy) as indicated and click Submit. You will be taken to the next sign-up page. 5. Create a [employer] ID. This will be your [employer] login ID and cannot be changed, so think of one that is secure and easy to remember. 6. Create a BigTipt password. You can change your password at any time. 7. Enter your Password Reset Question and Answer. This can be used at a later time if you forget your password. 8. Enter your e-mail address. You will receive e-mail notification when new information is available in [address] 9. Click Sign Up. You can now view and download portions of your medical record. 10. Click the Download Summary menu link to download a portable copy of your medical information. If you have questions, please visit the Frequently Asked Questions section of the [employer] website. Remember, [employer] is NOT to be used for urgent needs. For medical emergencies, dial 911. Now available from your iPhone and Android! Please provide this summary of care documentation to your next provider. Your primary care clinician is listed as CHRISTINE DE LUNA. If you have any questions after today's visit, please call [phone].

## 2017-07-17 ENCOUNTER — TELEPHONE (OUTPATIENT)
Dept: NEUROLOGY | Age: 52
End: 2017-07-17

## 2017-07-17 NOTE — TELEPHONE ENCOUNTER
Pt came in on 's call day. Informed pt  on call and would not be able to see pt. Pt would like to know if she will be able to have a CPAP machine ordered for her. She now has upcoming appointment on 08/14/2017.   Pt upset could not see  today (07/17/2017)

## 2017-07-17 NOTE — TELEPHONE ENCOUNTER
Attempting to call patient back however phone continues to ring. Patient will need to wait until appointment to discuss cpap options. Dr. Radha Okeefe being made aware and patient will be contacted if she advises differently.

## 2017-08-14 ENCOUNTER — OFFICE VISIT (OUTPATIENT)
Dept: NEUROLOGY | Age: 52
End: 2017-08-14

## 2017-08-14 VITALS
DIASTOLIC BLOOD PRESSURE: 90 MMHG | RESPIRATION RATE: 12 BRPM | WEIGHT: 238.8 LBS | OXYGEN SATURATION: 99 % | BODY MASS INDEX: 43.94 KG/M2 | TEMPERATURE: 98.3 F | HEART RATE: 67 BPM | SYSTOLIC BLOOD PRESSURE: 128 MMHG | HEIGHT: 62 IN

## 2017-08-14 DIAGNOSIS — G47.30 INSOMNIA WITH SLEEP APNEA: ICD-10-CM

## 2017-08-14 DIAGNOSIS — G47.00 INSOMNIA WITH SLEEP APNEA: ICD-10-CM

## 2017-08-14 DIAGNOSIS — R51.9 MORNING HEADACHE: ICD-10-CM

## 2017-08-14 DIAGNOSIS — G47.33 OSA (OBSTRUCTIVE SLEEP APNEA): Primary | ICD-10-CM

## 2017-08-14 DIAGNOSIS — R06.83 SNORING: ICD-10-CM

## 2017-08-14 DIAGNOSIS — G47.30 HYPERSOMNIA WITH SLEEP APNEA: ICD-10-CM

## 2017-08-14 DIAGNOSIS — G47.10 HYPERSOMNIA WITH SLEEP APNEA: ICD-10-CM

## 2017-08-14 NOTE — PROGRESS NOTES
[facility]             [address] [facility], [address] [place] [address]      Tiffanie Agrawal is right handed PATIENT REFUSED  female who presents in evaluation of sleep disorder. Patient describes mid adult years Onset was gradual over 10 years. Patient reports onset following onset of tinnitus, which is not been able to sleep well. She has difficulty initiating and staying asleep. . She also is reported to snore loudly per . She has gained approximately 25 pounds in the past 10 years. She goes to bed at 11:30 but takes up to an hour to fall asleep. She awakens 4-5 times up to 50 minutes at a time only wants to urinate. She wakes up at 5:15 to get up. She feels tired throughout the day. She is aware of possible bruxism, but no violent leg movements in sleep. . She reports falling very sleepy while driving. She does have carpal tunnel syndrome as well    Patient describes daytime sleepiness with difficulty getting and staying asleep she returns to review psg  Patient reports doing well on CPAP when she had the machine  but she had to give back to machine when she changed insurance Ess=12/24 stop bang 6/8    Current Outpatient Prescriptions:     losartan (COZAAR) 100 mg tablet, TAKE 1 TAB BY MOUTH DAILY. , Disp: 90 Tab, Rfl: 3    pravastatin (PRAVACHOL) 40 mg tablet, TAKE 1 TAB BY MOUTH NIGHTLY., Disp: 90 Tab, Rfl: 3    citalopram (CELEXA) 10 mg tablet, Take 0.5 Tabs by mouth daily. , Disp: 45 Tab, Rfl: 3    Cholecalciferol, Vitamin D3, (VITAMIN D3) 2,000 unit cap, 1 cap daily , Disp: 30 Cap, Rfl: prn    CALCIUM CITRATE/VITAMIN D3 (CITRACAL + D PO), Take  by mouth daily. , Disp: , Rfl:     MULTIVITAMINS (MULTIVITAMIN PO), Take 1 Tab by mouth daily. , Disp: , Rfl:   Past Medical History:   Diagnosis Date    Allergy     Anxiety 4/24/2010    Essential hypertension     HTN (hypertension) 4/24/2010    Hyperlipemia 4/24/2010    Hypokalemia 4/24/2010    Scoliosis      Social History     Social History    Marital status:      Spouse name: N/A    Number of children: N/A    Years of education: N/A     Occupational History    Not on file. Social History Main Topics    Smoking status: Never Smoker    Smokeless tobacco: Never Used    Alcohol use No    Drug use: No    Sexual activity: Not on file     Other Topics Concern    Not on file     Social History Narrative       Review of Systems:   Constitutional:  gained, 25 lbs. Eyes:  Positive blurred vision  CVS:  Negative chest pain  Pulm:  Negative shortness of breath  GI:  Negative nausea or vomiting  :  Positive nocturia  Musculoskeletal:  Positive significant joint pain at night  Skin:  Negative rashes  Neuro:  Negative dizziness   Psych:  Negative significant mood issues    Sleep Review of Systems: notable for Positive difficulty falling asleep; Positive awakenings at night; Negative percieved regular dreaming; Negative nightmares; occasional early morning headaches; 7 afternoon naps per week; Negative memory problems; Positive concentration issues; Negative history of any automobile or occupational accidents due to daytime drowsiness. Physical Exam    Visit Vitals    /90 (BP 1 Location: Left arm, BP Patient Position: Sitting)    Pulse 67    Temp 98.3 °F (36.8 °C) (Oral)    Resp 12    Ht 5' 2\" (1.575 m)    Wt 108.3 kg (238 lb 12.8 oz)    SpO2 99%    BMI 43.68 kg/m2       Neck Circumference:45 cm      General:  Well defined, nourished, and groomed individual in no acute distress. HEENT: head :at/nc Throat:oropharnynx  Crowding noted Mallampati 3  Neck: Supple, nontender, thyroid within normal limits, no JVD, no bruits, no pain   with resistance to active range of motion. Heart: Regular rate and rhythm, no murmurs, rub, or gallop. Normal S1S2. Lungs:  Clear to auscultation   Musculoskeletal:  Extremities revealed no edema, clubbing or cyanosis.    Psych:  Good mood and normal affect    Neurologic Exam    Mental Status: The patient is awake, alert, and oriented x 3. Speech is fluent and memory appears to be intact, both long and short term. No aphasias or apraxias. Cranial Nerves: II - Visual fields are full to confrontation. Pupils are both equal and reactive to light and accommodation. III, IV, VI - Extraocular movements are intact and there is no nystagmus. V - Facial sensation is intact to pinprick and light touch. VII - Face is symmetrical.   VIII - Hearing is present. Motor: Tone is normal and symmetric. There is no pronator drift present. The strength is intact for all muscle groups tested in all four extremities. Coordination:Gait testing is normal    Reviewed psg showing moderate to severe jyotsna patient has [place] needs trial auto cpap  Assessment and Plan  Karyle Skiff is a 46 y.o. right handed female whose history and physical are consistent with insomnia with sleep apnea. Karyle Skiff who has risk factors including obesity,snoring, hypertension,morning headaches  Diagnoses and all orders for this visit:    1. JYOTSNA (obstructive sleep apnea)  -     AMB SUPPLY ORDER    2. Insomnia with sleep apnea  -     AMB SUPPLY ORDER    3. Snoring  -     AMB SUPPLY ORDER    4. BMI 45.0-49.9, adult (HCC)  -     AMB SUPPLY ORDER    5. Hypersomnia with sleep apnea  -     AMB SUPPLY ORDER    6. Morning headache  -     AMB SUPPLY ORDER      Follow-up Disposition:  Return in about 2 months (around 10/14/2017). Reviewed work upneed to increase sleep amount ,avoid driving drowsy  Will reorder auto cpap between 6/14 cm h2o  I spent 30 minutes with the patient in face-to-face consultation, of which greater than 50% was spent in counseling and coordination of care as described above.

## 2017-08-14 NOTE — MR AVS SNAPSHOT
Visit Information Date & Time Provider Department Dept. Phone Encounter #  
 8/14/2017 11:30 AM Mami Castellon  [facility] [place] [address] [address] [id number] Follow-up Instructions Return in about 2 months (around 10/14/2017). Follow-up and Disposition History Your Appointments 10/13/2017  3:00 PM  
Follow Up with Flower Hodge MD  
[facility] ([facility]) Appt Note: follow up Marsha Chase [address] [address]  
  
    
 10/25/2017  4:00 PM  
Follow Up with Mami Castellon MD  
[facility] [address] [facility] [place] [address]) Appt Note: 2mon f/u; pt aware to provide cpap usage report [address] [facility] [address] [address] [address] [facility] Upcoming Health Maintenance Date Due FOBT Q 1 YEAR AGE 50-75 1/22/2015 INFLUENZA AGE 9 TO ADULT 8/1/2017 BREAST CANCER SCRN MAMMOGRAM 12/5/2018 PAP AKA CERVICAL CYTOLOGY 3/1/2020 DTaP/Tdap/Td series (2 - Td) 3/1/2027 Allergies as of 8/14/2017  Review Complete On: 8/14/2017 By: Tricia Miller LPN Severity Noted Reaction Type Reactions Hydrochlorothiazide   Side Effect Other (comments) TINGLING IN BACK OF HEAD Norvasc [Amlodipine]  05/24/2010    Other (comments) Tingling in back of head Current Immunizations  Reviewed on 11/3/2016 Name Date Influenza Vaccine 11/3/2015 Influenza Vaccine (Quad) PF 11/11/2016 Influenza Vaccine PF 11/22/2013 Influenza Vaccine Split 10/26/2012, 9/26/2011 Tdap 3/1/2017 Not reviewed this visit You Were Diagnosed With   
  
 Codes Comments SEFERINO (obstructive sleep apnea)    -  Primary ICD-10-CM: G47.33 
ICD-9-CM: 327.23 Insomnia with sleep apnea     ICD-10-CM: G47.00, G47.30 ICD-9-CM: 780.51  Snoring     ICD-10-CM: R06.83 
 ICD-9-CM: 786.09 BMI 45.0-49.9, adult [facility])     ICD-10-CM: M33.12 
ICD-9-CM: V85.42 Hypersomnia with sleep apnea     ICD-10-CM: G47.10, G47.30 ICD-9-CM: 780.53 Morning headache     ICD-10-CM: R51 ICD-9-CM: 287. 0 Vitals BP Pulse Temp Resp Height(growth percentile) Weight(growth percentile) 128/90 (BP 1 Location: Left arm, BP Patient Position: Sitting) 67 98.3 °F (36.8 °C) (Oral) 12 5' 2\" (1.575 m) 238 lb 12.8 oz (108.3 kg) SpO2 BMI OB Status Smoking Status 99% 43.68 kg/m2 Hysterectomy Never Smoker Vitals History BMI and BSA Data Body Mass Index Body Surface Area  
 43.68 kg/m 2 2.18 m 2 Preferred Pharmacy Pharmacy Name Phone [id number] [place], [facility] [facility] [phone] Your Updated Medication List  
  
   
This list is accurate as of: 8/14/17 12:05 PM.  Always use your most recent med list.  
  
  
  
  
 citalopram 10 mg tablet Commonly known as:  Chapis Chyle Take 0.5 Tabs by mouth daily. CITRACAL + D PO Take  by mouth daily. losartan 100 mg tablet Commonly known as:  COZAAR  
TAKE 1 TAB BY MOUTH DAILY. MULTIVITAMIN PO Take 1 Tab by mouth daily. pravastatin 40 mg tablet Commonly known as:  PRAVACHOL  
TAKE 1 TAB BY MOUTH NIGHTLY. VITAMIN D3 2,000 unit Cap capsule Generic drug:  Cholecalciferol (Vitamin D3) 1 cap daily We Performed the Following AMB SUPPLY ORDER [[id number] Custom] Comments:  
 autoCPAP between 6-14 cm H2O for lifetime use Heated humidification Mask of patient preference, PAP supplies, modem Download to include: usage, leakage, AHI in three weeks Dx. Obstructive sleep apnea Follow-up Instructions Return in about 2 months (around 10/14/2017). Patient Instructions No driving drowsy Introducing [facility] & HEALTH SERVICES!    
 [employer] introduces [employer] patient portal. Now you can access parts of your medical record, email your doctor's office, and request medication refills online. 1. In your internet browser, go to [email or web address] 2. Click on the First Time User? Click Here link in the Sign In box. You will see the New Member Sign Up page. 3. Enter your [employer] Access Code exactly as it appears below. You will not need to use this code after youve completed the sign-up process. If you do not sign up before the expiration date, you must request a new code. · [employer] Access Code: [email or web address] Expires: 10/3/2017  3:56 PM 
 
4. Enter the last four digits of your Social Security Number (xxxx) and Date of Birth (mm/dd/yyyy) as indicated and click Submit. You will be taken to the next sign-up page. 5. Create a [employer] ID. This will be your [employer] login ID and cannot be changed, so think of one that is secure and easy to remember. 6. Create a [employer] password. You can change your password at any time. 7. Enter your Password Reset Question and Answer. This can be used at a later time if you forget your password. 8. Enter your e-mail address. You will receive e-mail notification when new information is available in [address] 9. Click Sign Up. You can now view and download portions of your medical record. 10. Click the Download Summary menu link to download a portable copy of your medical information. If you have questions, please visit the Frequently Asked Questions section of the [employer] website. Remember, [employer] is NOT to be used for urgent needs. For medical emergencies, dial 911. Now available from your iPhone and Android! Please provide this summary of care documentation to your next provider. Your primary care clinician is listed as CHRISTINE DE LUNA. If you have any questions after today's visit, please call [phone].

## 2017-08-15 ENCOUNTER — DOCUMENTATION ONLY (OUTPATIENT)
Dept: NEUROLOGY | Age: 52
End: 2017-08-15

## 2017-08-22 ENCOUNTER — TELEPHONE (OUTPATIENT)
Dept: NEUROLOGY | Age: 52
End: 2017-08-22

## 2017-08-23 ENCOUNTER — TELEPHONE (OUTPATIENT)
Dept: NEUROLOGY | Age: 52
End: 2017-08-23

## 2017-09-13 ENCOUNTER — TELEPHONE (OUTPATIENT)
Dept: NEUROLOGY | Age: 52
End: 2017-09-13

## 2017-09-22 DIAGNOSIS — F41.8 DEPRESSION WITH ANXIETY: ICD-10-CM

## 2017-09-22 RX ORDER — CITALOPRAM 10 MG/1
TABLET ORAL
Qty: 45 TAB | Refills: 3 | Status: SHIPPED | OUTPATIENT
Start: 2017-09-22 | End: 2018-09-24 | Stop reason: SDUPTHER

## 2017-10-18 ENCOUNTER — HOSPITAL ENCOUNTER (OUTPATIENT)
Dept: LAB | Age: 52
Discharge: HOME | End: 2017-10-18

## 2017-10-18 PROCEDURE — 99001 SPECIMEN HANDLING PT-LAB: CPT | Performed by: FAMILY MEDICINE

## 2017-10-19 LAB
ALBUMIN SERPL-MCNC: 4.2 G/DL (ref 3.5–5.5)
ALBUMIN/GLOB SERPL: 1.7 {RATIO} (ref 1.2–2.2)
ALP SERPL-CCNC: 104 IU/L (ref 39–117)
ALT SERPL-CCNC: 11 IU/L (ref 0–32)
AST SERPL-CCNC: 14 IU/L (ref 0–40)
BILIRUB SERPL-MCNC: 0.3 MG/DL (ref 0–1.2)
BUN SERPL-MCNC: 8 MG/DL (ref 6–24)
BUN/CREAT SERPL: 12 (ref 9–23)
CALCIUM SERPL-MCNC: 9.1 MG/DL (ref 8.7–10.2)
CHLORIDE SERPL-SCNC: 98 MMOL/L (ref 96–106)
CHOLEST SERPL-MCNC: 147 MG/DL (ref 100–199)
CO2 SERPL-SCNC: 23 MMOL/L (ref 18–29)
CREAT SERPL-MCNC: 0.67 MG/DL (ref 0.57–1)
GFR SERPLBLD CREATININE-BSD FMLA CKD-EPI: 101 ML/MIN/1.73
GFR SERPLBLD CREATININE-BSD FMLA CKD-EPI: 117 ML/MIN/1.73
GLOBULIN SER CALC-MCNC: 2.5 G/DL (ref 1.5–4.5)
GLUCOSE SERPL-MCNC: 52 MG/DL (ref 65–99)
HBA1C MFR BLD: 6.3 % (ref 4.8–5.6)
HDLC SERPL-MCNC: 51 MG/DL
INTERPRETATION, 910389: NORMAL
LDLC SERPL CALC-MCNC: 80 MG/DL (ref 0–99)
POTASSIUM SERPL-SCNC: 5.2 MMOL/L (ref 3.5–5.2)
PROT SERPL-MCNC: 6.7 G/DL (ref 6–8.5)
SODIUM SERPL-SCNC: 138 MMOL/L (ref 134–144)
TRIGL SERPL-MCNC: 81 MG/DL (ref 0–149)
VLDLC SERPL CALC-MCNC: 16 MG/DL (ref 5–40)

## 2017-10-24 ENCOUNTER — OFFICE VISIT (OUTPATIENT)
Dept: FAMILY MEDICINE CLINIC | Age: 52
End: 2017-10-24

## 2017-10-24 VITALS
BODY MASS INDEX: 44.4 KG/M2 | DIASTOLIC BLOOD PRESSURE: 75 MMHG | TEMPERATURE: 98.2 F | WEIGHT: 241.25 LBS | HEIGHT: 62 IN | SYSTOLIC BLOOD PRESSURE: 136 MMHG | HEART RATE: 60 BPM | RESPIRATION RATE: 16 BRPM

## 2017-10-24 DIAGNOSIS — G47.33 OSA ON CPAP: ICD-10-CM

## 2017-10-24 DIAGNOSIS — R73.03 PREDIABETES: ICD-10-CM

## 2017-10-24 DIAGNOSIS — Z23 ENCOUNTER FOR IMMUNIZATION: ICD-10-CM

## 2017-10-24 DIAGNOSIS — E78.5 HYPERLIPIDEMIA, UNSPECIFIED HYPERLIPIDEMIA TYPE: Primary | ICD-10-CM

## 2017-10-24 DIAGNOSIS — I10 ESSENTIAL HYPERTENSION: ICD-10-CM

## 2017-10-24 DIAGNOSIS — Z99.89 OSA ON CPAP: ICD-10-CM

## 2017-10-24 NOTE — PROGRESS NOTES
Corrinne Exon is a 46 y.o. female who presents for routine immunizations. She denies any symptoms , reactions or allergies that would exclude them from being immunized today. Risks and adverse reactions were discussed and the VIS was given to them. All questions were addressed. She was observed for 15 min post injection. There were no reactions observed.     Meka Talamantes LPN

## 2017-10-24 NOTE — PROGRESS NOTES
Chief Complaint   Patient presents with    Cholesterol Problem     high chol    Blood sugar problem     prediabetes    Hypertension    Sleep Problem     SEFERINO    Results     discuss lab results       Health Maintenance Due   Topic Date Due    FOBT Q 1 YEAR AGE 50-75  01/22/2015    INFLUENZA AGE 9 TO ADULT  08/01/2017       Health Maintenance reviewed     1. Have you been to the ER, urgent care clinic since your last visit? Hospitalized since your last visit? No    2. Have you seen or consulted any other health care providers outside of the [address] [place] [address] since your last visit? Include any pap smears or colon screening.  No

## 2017-10-24 NOTE — PATIENT INSTRUCTIONS
Please contact our office if you have any questions about your visit today. Vaccine Information Statement    Influenza (Flu) Vaccine (Inactivated or Recombinant): What you need to know    Many Vaccine Information Statements are available in Bangladeshi and other languages. See www.immunize.org/vis  Hojas de Información Sobre Vacunas están disponibles en Español y en muchos otros idiomas. Visite www.immunize.org/vis    1. Why get vaccinated? Influenza (flu) is a contagious disease that spreads around the [facility] every year, usually between October and May. Flu is caused by influenza viruses, and is spread mainly by coughing, sneezing, and close contact. Anyone can get flu. Flu strikes suddenly and can last several days. Symptoms vary by age, but can include:   fever/chills   sore throat   muscle aches   fatigue   cough   headache    runny or stuffy nose    Flu can also lead to pneumonia and blood infections, and cause diarrhea and seizures in children. If you have a medical condition, such as heart or lung disease, flu can make it worse. Flu is more dangerous for some people. Infants and young children, people 72years of age and older, pregnant women, and people with certain health conditions or a weakened immune system are at greatest risk. Each year thousands of people in the [facility] die from flu, and many more are hospitalized. Flu vaccine can:   keep you from getting flu,   make flu less severe if you do get it, and   keep you from spreading flu to your family and other people. 2. Inactivated and recombinant flu vaccines    A dose of flu vaccine is recommended every flu season. Children 6 months through 6years of age may need two doses during the same flu season. Everyone else needs only one dose each flu season.        Some inactivated flu vaccines contain a very small amount of a mercury-based preservative called thimerosal. Studies have not shown thimerosal in vaccines to be harmful, but flu vaccines that do not contain thimerosal are available. There is no live flu virus in flu shots. They cannot cause the flu. There are many flu viruses, and they are always changing. Each year a new flu vaccine is made to protect against three or four viruses that are likely to cause disease in the upcoming flu season. But even when the vaccine doesnt exactly match these viruses, it may still provide some protection    Flu vaccine cannot prevent:   flu that is caused by a virus not covered by the vaccine, or   illnesses that look like flu but are not. It takes about 2 weeks for protection to develop after vaccination, and protection lasts through the flu season. 3. Some people should not get this vaccine    Tell the person who is giving you the vaccine:     If you have any severe, life-threatening allergies. If you ever had a life-threatening allergic reaction after a dose of flu vaccine, or have a severe allergy to any part of this vaccine, you may be advised not to get vaccinated. Most, but not all, types of flu vaccine contain a small amount of egg protein.  If you ever had Guillain-Barré Syndrome (also called GBS). Some people with a history of GBS should not get this vaccine. This should be discussed with your doctor.  If you are not feeling well. It is usually okay to get flu vaccine when you have a mild illness, but you might be asked to come back when you feel better. 4. Risks of a vaccine reaction    With any medicine, including vaccines, there is a chance of reactions. These are usually mild and go away on their own, but serious reactions are also possible. Most people who get a flu shot do not have any problems with it.      Minor problems following a flu shot include:    soreness, redness, or swelling where the shot was given     hoarseness   sore, red or itchy eyes   cough   fever   aches   headache   itching   fatigue  If these problems occur, they usually begin soon after the shot and last 1 or 2 days. More serious problems following a flu shot can include the following:     There may be a small increased risk of Guillain-Barré Syndrome (GBS) after inactivated flu vaccine. This risk has been estimated at 1 or 2 additional cases per million people vaccinated. This is much lower than the risk of severe complications from flu, which can be prevented by flu vaccine.  Young children who get the flu shot along with pneumococcal vaccine (PCV13) and/or DTaP vaccine at the same time might be slightly more likely to have a seizure caused by fever. Ask your doctor for more information. Tell your doctor if a child who is getting flu vaccine has ever had a seizure. Problems that could happen after any injected vaccine:      People sometimes faint after a medical procedure, including vaccination. Sitting or lying down for about 15 minutes can help prevent fainting, and injuries caused by a fall. Tell your doctor if you feel dizzy, or have vision changes or ringing in the ears.  Some people get severe pain in the shoulder and have difficulty moving the arm where a shot was given. This happens very rarely.  Any medication can cause a severe allergic reaction. Such reactions from a vaccine are very rare, estimated at about 1 in a million doses, and would happen within a few minutes to a few hours after the vaccination. As with any medicine, there is a very remote chance of a vaccine causing a serious injury or death. The safety of vaccines is always being monitored. For more information, visit: www.cdc.gov/vaccinesafety/    5. What if there is a serious reaction? What should I look for?  Look for anything that concerns you, such as signs of a severe allergic reaction, very high fever, or unusual behavior.     Signs of a severe allergic reaction can include hives, swelling of the face and throat, difficulty breathing, a fast heartbeat, dizziness, and weakness - usually within a few minutes to a few hours after the vaccination. What should I do?  If you think it is a severe allergic reaction or other emergency that cant wait, call 9-1-1 and get the person to the nearest hospital. Otherwise, call your doctor.  Reactions should be reported to the Vaccine Adverse Event Reporting System (VAERS). Your doctor should file this report, or you can do it yourself through  the VAERS web site at www.vaers. [facility].gov, or by calling 4-848.310.7189. VAERS does not give medical advice. 6. The National Vaccine Injury Compensation Program    The [facility] Vaccine Injury Compensation Program (VICP) is a federal program that was created to compensate people who may have been injured by certain vaccines. Persons who believe they may have been injured by a vaccine can learn about the program and about filing a claim by calling 5-265.433.7193 or visiting the [facility] website at www.[facility].gov/vaccinecompensation. There is a time limit to file a claim for compensation. 7. How can I learn more?  Ask your healthcare provider. He or she can give you the vaccine package insert or suggest other sources of information.  Call your local or state health department.  Contact the Centers for Disease Control and Prevention (CDC):  - Call 5-160.862.7695 (1-800-CDC-INFO) or  - Visit CDCs website at www.cdc.gov/flu    Vaccine Information Statement   Inactivated Influenza Vaccine   8/7/2015  [address] [id number]    Department of Health and Human Services  Centers for Disease Control and Prevention    Office Use Only

## 2017-10-24 NOTE — PROGRESS NOTES
HISTORY OF PRESENT ILLNESS  Patricia Sheriff is a 46 y.o. female. Chief Complaint   Patient presents with    Cholesterol Problem Chronic problem, uncontrolled Reports compliance with meds and diet     high chol    Blood sugar problem     Prediabetes chronic problem, stable no meds on diet for this working hard on diet    Hypertension chronic problem, stable Reports compliance with meds Asymptomatic, no headache or dizziness.  Sleep Problem     SEFERINO Chronic problem, uncontrolled difficulty in getting apparatus    Results     discuss lab results    Immunization/Injection     flu vaccine   Depression and anxiety Chronic problem, uncontrolled increased with acute illness of  in the hospital now for tests    HPI  Past Medical History:   Diagnosis Date    Allergy     Anxiety 4/24/2010    Essential hypertension     HTN (hypertension) 4/24/2010    Hyperlipemia 4/24/2010    Hypokalemia 4/24/2010    Scoliosis      Current Outpatient Prescriptions   Medication Sig Dispense Refill    citalopram (CELEXA) 10 mg tablet TAKE 0.5 TABS BY MOUTH DAily 45 Tab 3    losartan (COZAAR) 100 mg tablet TAKE 1 TAB BY MOUTH DAILY. 90 Tab 3    pravastatin (PRAVACHOL) 40 mg tablet TAKE 1 TAB BY MOUTH NIGHTLY. 90 Tab 3    Cholecalciferol, Vitamin D3, (VITAMIN D3) 2,000 unit cap 1 cap daily   30 Cap prn    CALCIUM CITRATE/VITAMIN D3 (CITRACAL + D PO) Take  by mouth daily.  MULTIVITAMINS (MULTIVITAMIN PO) Take 1 Tab by mouth daily. Allergies   Allergen Reactions    Hydrochlorothiazide Other (comments)     TINGLING IN BACK OF HEAD    Norvasc [Amlodipine] Other (comments)     Tingling in back of head       ROS Respiratory: Negative for shortness of breath. Cardiovascular: Negative for chest pain. Genitourinary: Negative for frequency. Neurological: Negative for dizziness and headaches.     Visit Vitals    /75 (BP 1 Location: Left arm, BP Patient Position: Sitting)    Pulse 60    Temp 98.2 °F (36.8 °C) (Oral)    Resp 16    Ht 5' 2\" (1.575 m)    Wt 241 lb 4 oz (109.4 kg)    BMI 44.13 kg/m2       Physical Exam Nursing note and vitals reviewed. Constitutional: She is oriented to person, place, and time. She appears well-developed and well-nourished. No distress. HENT:   Mouth/Throat: Oropharynx is clear and moist.   Neck: No JVD present. No thyromegaly present. Cardiovascular: Normal rate, regular rhythm and normal heart sounds. Pulmonary/Chest: Effort normal and breath sounds normal. No respiratory distress. She has no wheezes. She has no rales. Musculoskeletal: She exhibits no edema. Lymphadenopathy:     She has no cervical adenopathy. Neurological: She is alert and oriented to person, place, and time. Coordination normal.   Psychiatric: She has a normal mood and affect. Her behavior is normal.    Results for orders placed or performed in visit on 99/37/62   METABOLIC PANEL, COMPREHENSIVE   Result Value Ref Range    Glucose 52 (L) 65 - 99 mg/dL    BUN 8 6 - 24 mg/dL    Creatinine 0.67 0.57 - 1.00 mg/dL    GFR est non- >59 mL/min/1.73    GFR est  >59 mL/min/1.73    BUN/Creatinine ratio 12 9 - 23    Sodium 138 134 - 144 mmol/L    Potassium 5.2 3.5 - 5.2 mmol/L    Chloride 98 96 - 106 mmol/L    CO2 23 18 - 29 mmol/L    Calcium 9.1 8.7 - 10.2 mg/dL    Protein, total 6.7 6.0 - 8.5 g/dL    Albumin 4.2 3.5 - 5.5 g/dL    GLOBULIN, TOTAL 2.5 1.5 - 4.5 g/dL    A-G Ratio 1.7 1.2 - 2.2    Bilirubin, total 0.3 0.0 - 1.2 mg/dL    Alk.  phosphatase 104 39 - 117 IU/L    AST (SGOT) 14 0 - 40 IU/L    ALT (SGPT) 11 0 - 32 IU/L   LIPID PANEL   Result Value Ref Range    Cholesterol, total 147 100 - 199 mg/dL    Triglyceride 81 0 - 149 mg/dL    HDL Cholesterol 51 >39 mg/dL    VLDL, calculated 16 5 - 40 mg/dL    LDL, calculated 80 0 - 99 mg/dL   HEMOGLOBIN A1C W/O EAG   Result Value Ref Range    Hemoglobin A1c 6.3 (H) 4.8 - 5.6 %   CVD REPORT   Result Value Ref Range    INTERPRETATION Note ASSESSMENT and PLAN    ICD-10-CM ICD-9-CM    1. Hyperlipidemia, unspecified hyperlipidemia type E78.5 272.4 improved significantly stable continue current medications    2. Prediabetes R73.03 790.29  improved Stable, continue current care. 3. Essential hypertension I10 401.9  stable continue current medications    4. SEFERINO on CPAP G47.33 327.23     Z99.89 V46.8    5. Depression and anxiety exacerbation of secondary to stress continue meds    Follow-up Disposition:  Return in about 3 months (around 1/24/2018).

## 2017-10-25 ENCOUNTER — OFFICE VISIT (OUTPATIENT)
Dept: NEUROLOGY | Age: 52
End: 2017-10-25

## 2017-10-25 VITALS
OXYGEN SATURATION: 99 % | HEIGHT: 62 IN | SYSTOLIC BLOOD PRESSURE: 142 MMHG | WEIGHT: 241 LBS | TEMPERATURE: 98.7 F | HEART RATE: 62 BPM | RESPIRATION RATE: 12 BRPM | DIASTOLIC BLOOD PRESSURE: 82 MMHG | BODY MASS INDEX: 44.35 KG/M2

## 2017-10-25 DIAGNOSIS — R06.83 SNORING: ICD-10-CM

## 2017-10-25 DIAGNOSIS — G47.33 OSA (OBSTRUCTIVE SLEEP APNEA): Primary | ICD-10-CM

## 2017-10-25 DIAGNOSIS — G47.33 OSA ON CPAP: ICD-10-CM

## 2017-10-25 DIAGNOSIS — G47.30 INSOMNIA WITH SLEEP APNEA: ICD-10-CM

## 2017-10-25 DIAGNOSIS — Z99.89 OSA ON CPAP: ICD-10-CM

## 2017-10-25 DIAGNOSIS — G47.00 INSOMNIA WITH SLEEP APNEA: ICD-10-CM

## 2017-10-25 NOTE — MR AVS SNAPSHOT
Visit Information Date & Time Provider Department Dept. Phone Encounter #  
 10/25/2017  4:00 PM Giana Snyder MD [facility] at [address] [facility] [id number] Follow-up Instructions Return in about 6 months (around 4/25/2018). Follow-up and Disposition History Your Appointments 1/22/2018 10:00 AM  
Follow Up with Car Carson MD  
[facility] (--) Appt Note: 3 month follow up Marsha Chase [address] [address]  
  
    
 4/25/2018  4:00 PM  
Follow Up with Giana Snyder MD  
[facility] at [address] [facility]) Appt Note: 6 month fu DOWNLOAD  
 [address] [address] [address] [facility] Upcoming Health Maintenance Date Due FOBT Q 1 YEAR AGE 50-75 1/22/2015 BREAST CANCER SCRN MAMMOGRAM 12/5/2018 PAP AKA CERVICAL CYTOLOGY 3/1/2020 DTaP/Tdap/Td series (2 - Td) 3/1/2027 Allergies as of 10/25/2017  Review Complete On: 10/25/2017 By: Ezequiel Acosta LPN Severity Noted Reaction Type Reactions Hydrochlorothiazide   Side Effect Other (comments) TINGLING IN BACK OF HEAD Norvasc [Amlodipine]  05/24/2010    Other (comments) Tingling in back of head Current Immunizations  Reviewed on 11/3/2016 Name Date Influenza Vaccine 11/3/2015 Influenza Vaccine (Quad) PF 10/24/2017, 11/11/2016 Influenza Vaccine PF 11/22/2013 Influenza Vaccine Split 10/26/2012, 9/26/2011 Tdap 3/1/2017 Not reviewed this visit You Were Diagnosed With   
  
 Codes Comments SEEFRINO (obstructive sleep apnea)    -  Primary ICD-10-CM: G47.33 
ICD-9-CM: 327.23 Insomnia with sleep apnea     ICD-10-CM: G47.00, G47.30 ICD-9-CM: 780.51  Snoring     ICD-10-CM: R06.83 
ICD-9-CM: 786.09   
 BMI 45.0-49.9, adult (HCC)     ICD-10-CM: Z34.54 
ICD-9-CM: V85.42   
 SEFERINO on CPAP     ICD-10-CM: G47.33, Z99.89 ICD-9-CM: 327.23, V46.8 Vitals BP Pulse Temp Resp Height(growth percentile) Weight(growth percentile) 142/82 (BP 1 Location: Right arm, BP Patient Position: Sitting) 62 98.7 °F (37.1 °C) (Oral) 12 5' 2\" (1.575 m) 241 lb (109.3 kg) SpO2 BMI OB Status Smoking Status 99% 44.08 kg/m2 Hysterectomy Never Smoker Vitals History BMI and BSA Data Body Mass Index Body Surface Area 44.08 kg/m 2 2.19 m 2 Preferred Pharmacy Pharmacy Name Phone [id number], [address] [facility] [place] [phone] Your Updated Medication List  
  
   
This list is accurate as of: 10/25/17  4:25 PM.  Always use your most recent med list.  
  
  
  
  
 citalopram 10 mg tablet Commonly known as:  CELEXA  
TAKE 0.5 TABS BY MOUTH DAILY.***TOO SOON 08/28*** CITRACAL + D PO Take  by mouth daily. losartan 100 mg tablet Commonly known as:  COZAAR  
TAKE 1 TAB BY MOUTH DAILY. MULTIVITAMIN PO Take 1 Tab by mouth daily. pravastatin 40 mg tablet Commonly known as:  PRAVACHOL  
TAKE 1 TAB BY MOUTH NIGHTLY. VITAMIN D3 2,000 unit Cap capsule Generic drug:  Cholecalciferol (Vitamin D3) 1 cap daily Follow-up Instructions Return in about 6 months (around 4/25/2018). Patient Instructions No driving drowsy Introducing [facility]! [employer] introduces [place] patient portal. Now you can access parts of your medical record, email your doctor's office, and request medication refills online. 1. In your internet browser, go to [email or web address] 2. Click on the First Time User? Click Here link in the Sign In box. You will see the New Member Sign Up page. 3. Enter your [place] Access Code exactly as it appears below.  You will not need to use this code after youve completed the sign-up process. If you do not sign up before the expiration date, you must request a new code. · [employer] Access Code: [email or web address] Expires: 1/22/2018  9:55 AM 
 
4. Enter the last four digits of your Social Security Number (xxxx) and Date of Birth (mm/dd/yyyy) as indicated and click Submit. You will be taken to the next sign-up page. 5. Create a [employer] ID. This will be your [employer] login ID and cannot be changed, so think of one that is secure and easy to remember. 6. Create a [employer] password. You can change your password at any time. 7. Enter your Password Reset Question and Answer. This can be used at a later time if you forget your password. 8. Enter your e-mail address. You will receive e-mail notification when new information is available in [address]. 9. Click Sign Up. You can now view and download portions of your medical record. 10. Click the Download Summary menu link to download a portable copy of your medical information. If you have questions, please visit the Frequently Asked Questions section of the [employer] website. Remember, [employer] is NOT to be used for urgent needs. For medical emergencies, dial 911. Now available from your iPhone and Android! Please provide this summary of care documentation to your next provider. Your primary care clinician is listed as CHRISTINE DE LUNA. If you have any questions after today's visit, please call [phone].

## 2017-10-25 NOTE — PROGRESS NOTES
[facility]             [address] [facility], [address]      Megan Rivera is right handed PATIENT REFUSED  female who presents in evaluation of sleep disorder. Patient describes mid adult years Onset was gradual over 10 years. Patient reports onset following onset of tinnitus, which is not been able to sleep well. She has difficulty initiating and staying asleep. . She also is reported to snore loudly per . She has gained approximately 25 pounds in the past 10 years. She goes to bed at 11:30 but takes up to an hour to fall asleep. She awakens 4-5 times up to 50 minutes at a time only wants to urinate. She wakes up at 5:15 to get up. She feels tired throughout the day. She is aware of possible bruxism, but no violent leg movements in sleep. . She reports falling very sleepy while driving. She does have carpal tunnel syndrome as well    Patient describes daytime sleepiness with difficulty getting and staying asleep she returns to review psg  Patient reports doing well on CPAP   download reviewed AHI 3.7 average use 5-1/2 hours they use more than 4 hours 97% no significant leakage     she does report that she occasionally has watering eyes and feels that mask does not fit right. She was instructed to check with DME regard mask issues Ess=5/24 stop bang 6/8    Past Medical History:   Diagnosis Date    Allergy     Anxiety 4/24/2010    Essential hypertension     HTN (hypertension) 4/24/2010    Hyperlipemia 4/24/2010    Hypokalemia 4/24/2010    Scoliosis      Social History     Social History    Marital status:      Spouse name: N/A    Number of children: N/A    Years of education: N/A     Occupational History    Not on file.      Social History Main Topics    Smoking status: Never Smoker    Smokeless tobacco: Never Used    Alcohol use No    Drug use: No    Sexual activity: Not on file     Other Topics Concern    Not on file Social History Narrative       Review of Systems:   Constitutional:  gained, 25 lbs. Eyes:  Positive blurred vision  CVS:  Negative chest pain  Pulm:  Negative shortness of breath  GI:  Negative nausea or vomiting  :  Positive nocturia  Musculoskeletal:  Positive significant joint pain at night  Skin:  Negative rashes  Neuro:  Negative dizziness   Psych:  Negative significant mood issues    Sleep Review of Systems: notable for Positive difficulty falling asleep; Positive awakenings at night; Negative percieved regular dreaming; Negative nightmares; occasional early morning headaches; 7 afternoon naps per week; Negative memory problems; Positive concentration issues; Negative history of any automobile or occupational accidents due to daytime drowsiness. Physical Exam    Visit Vitals    /82 (BP 1 Location: Right arm, BP Patient Position: Sitting)    Pulse 62    Temp 98.7 °F (37.1 °C) (Oral)    Resp 12    Ht 5' 2\" (1.575 m)    Wt 109.3 kg (241 lb)    SpO2 99%    BMI 44.08 kg/m2       Neck Circumference:45 cm      General:  Well defined, nourished, and groomed individual in no acute distress. HEENT: head :at/nc Throat:oropharnynx  Crowding noted Mallampati 3  Neck: Supple, nontender, thyroid within normal limits, no JVD, no bruits, no pain   with resistance to active range of motion. Heart: Regular rate and rhythm, no murmurs, rub, or gallop. Normal S1S2. Lungs:  Clear to auscultation   Musculoskeletal:  Extremities revealed no edema, clubbing or cyanosis. Psych:  Good mood and normal affect    Neurologic Exam    Mental Status: The patient is awake, alert, and oriented x 3. Speech is fluent and memory appears to be intact, both long and short term. No aphasias or apraxias. Cranial Nerves: II  Visual fields are full to confrontation. Pupils are both equal and reactive to light and accommodation. III, IV, VI  Extraocular movements are intact and there is no nystagmus.      VII  Face is symmetrical.   VIII - Hearing is present. Motor: Tone is normal and symmetric. There is no pronator drift present. The strength is intact for all muscle groups tested in all four extremities. Coordination:Gait testing is normal    Reviewed psg showing moderate to severe jyotsna autopap down load reviewed in hpi  Assessment and Plan  Ovidio Ramírez is a 46 y.o. right handed female whose history and physical are consistent with insomnia with sleep apnea. Ovidio Ramírez who has risk factors including obesity,snoring, hypertension,morning headaches  Diagnoses and all orders for this visit:    1. JYOTSNA (obstructive sleep apnea)    2. Insomnia with sleep apnea    3. Snoring    4. BMI 45.0-49.9, adult ([facility] [address]    5. JYOTSNA on CPAP      Follow-up Disposition:  Return in about 6 months (around 4/25/2018). Reviewed work upneed to increase sleep amount ,avoid driving drowsy  Will continuer auto cpap between 6/14 cm h2o  I spent 30 minutes with the patient in face-to-face consultation, of which greater than 50% was spent in counseling and coordination of care as described above.

## 2017-12-07 DIAGNOSIS — E78.5 HYPERLIPIDEMIA, UNSPECIFIED HYPERLIPIDEMIA TYPE: ICD-10-CM

## 2017-12-07 RX ORDER — PRAVASTATIN SODIUM 40 MG/1
TABLET ORAL
Qty: 90 TAB | Refills: 3 | Status: SHIPPED | OUTPATIENT
Start: 2017-12-07 | End: 2018-12-27 | Stop reason: SDUPTHER

## 2018-05-14 ENCOUNTER — TELEPHONE (OUTPATIENT)
Dept: NEUROLOGY | Age: 53
End: 2018-05-14

## 2018-05-14 NOTE — TELEPHONE ENCOUNTER
CPAP Usage Report received from Betty Braden in Dr. Jag Patrick' folder @ [facility]

## 2018-05-18 ENCOUNTER — OFFICE VISIT (OUTPATIENT)
Dept: NEUROLOGY | Age: 53
End: 2018-05-18

## 2018-05-18 VITALS
WEIGHT: 246.2 LBS | TEMPERATURE: 98.2 F | HEIGHT: 62 IN | HEART RATE: 57 BPM | OXYGEN SATURATION: 99 % | SYSTOLIC BLOOD PRESSURE: 142 MMHG | RESPIRATION RATE: 20 BRPM | DIASTOLIC BLOOD PRESSURE: 88 MMHG | BODY MASS INDEX: 45.3 KG/M2

## 2018-05-18 DIAGNOSIS — G47.26 SHIFTING SLEEP-WORK SCHEDULE, AFFECTING SLEEP: ICD-10-CM

## 2018-05-18 DIAGNOSIS — G47.33 OSA (OBSTRUCTIVE SLEEP APNEA): Primary | ICD-10-CM

## 2018-05-18 PROBLEM — E66.01 OBESITY, MORBID (HCC): Status: ACTIVE | Noted: 2018-05-18

## 2018-05-18 NOTE — MR AVS SNAPSHOT
[address] [facility] 
 
 
 [address] 706 [facility] 
[phone] Patient: Martha Mancini MRN:  :[id number] Visit Information Date & Time Provider Department Dept. Phone Encounter #  
 2018  9:00 AM Mayelin Jackson  [facility] at [address] [facility] [id number] Follow-up Instructions Return in about 3 months (around 2018). Your Appointments 8/10/2018  4:00 PM  
Follow Up with Mayelin Jackson MD  
302 [facility] at [address] [facility] 3651 [facility]) Appt Note: 3 month fu  
 [address] [address] [address] [facility] 630 [facility] [address] 706 [facility] Upcoming Health Maintenance Date Due FOBT Q 1 YEAR AGE 50-75 2015 Influenza Age 5 to Adult 2018 BREAST CANCER SCRN MAMMOGRAM 2018 PAP AKA CERVICAL CYTOLOGY 3/1/2020 DTaP/Tdap/Td series (2 - Td) 3/1/2027 Allergies as of 2018  Review Complete On: 2018 By: Sushma Mackenzie LPN Severity Noted Reaction Type Reactions Hydrochlorothiazide   Side Effect Other (comments) TINGLING IN BACK OF HEAD Norvasc [Amlodipine]  2010    Other (comments) Tingling in back of head Current Immunizations  Reviewed on 11/3/2016 Name Date Influenza Vaccine 11/3/2015 Influenza Vaccine (Quad) PF 10/24/2017, 2016 Influenza Vaccine PF 2013 Influenza Vaccine Split 10/26/2012, 2011 Tdap 3/1/2017 Not reviewed this visit You Were Diagnosed With   
  
 Codes Comments SEFERINO (obstructive sleep apnea)    -  Primary ICD-10-CM: G47.33 
ICD-9-CM: 327.23 Vitals BP Pulse Temp Resp Height(growth percentile) Weight(growth percentile) 142/88 (BP 1 Location: Left arm, BP Patient Position: Sitting) (!) 57 98.2 °F (36.8 °C) (Oral) 20 5' 2\" (1.575 m) 246 lb 3.2 oz (111.7 kg) SpO2 BMI OB Status Smoking Status 99% 45.03 kg/m2 Hysterectomy Never Smoker Vitals History BMI and BSA Data Body Mass Index Body Surface Area 45.03 kg/m 2 2.21 m 2 Preferred Pharmacy Pharmacy Name Phone [id number], [address] [place] [address],[address] Floor [facility] [phone] Your Updated Medication List  
  
   
This list is accurate as of 5/18/18 10:06 AM.  Always use your most recent med list.  
  
  
  
  
 citalopram 10 mg tablet Commonly known as:  CELEXA  
TAKE 0.5 TABS BY MOUTH DAILY.***TOO SOON 08/28*** CITRACAL + D PO Take  by mouth daily. losartan 100 mg tablet Commonly known as:  COZAAR  
TAKE 1 TAB BY MOUTH DAILY. MULTIVITAMIN PO Take 1 Tab by mouth daily. pravastatin 40 mg tablet Commonly known as:  PRAVACHOL  
TAKE 1 TABLET BY MOUTH NIGHTLY. VITAMIN D3 2,000 unit Cap capsule Generic drug:  Cholecalciferol (Vitamin D3) 1 cap daily We Performed the Following AMB SUPPLY ORDER [[id number] Custom] Comments:  
 1-Change auto CPAP to min of 7, max of 16 cms H2O.  
2-Order Li Dreamwear FF mask, DME to size. Follow-up Instructions Return in about 3 months (around 8/18/2018). Introducing [facility]! [facility] introduces Digital Fortress patient portal. Now you can access parts of your medical record, email your doctor's office, and request medication refills online. 1. In your internet browser, go to [email or web address] 2. Click on the First Time User? Click Here link in the Sign In box. You will see the New Member Sign Up page. 3. Enter your Digital Fortress Access Code exactly as it appears below. You will not need to use this code after youve completed the sign-up process. If you do not sign up before the expiration date, you must request a new code. · Digital Fortress Access Code: [email or web address] Expires: 8/16/2018  8:44 AM 
 
 4. Enter the last four digits of your Social Security Number (xxxx) and Date of Birth (mm/dd/yyyy) as indicated and click Submit. You will be taken to the next sign-up page. 5. Create a Venuetastic ID. This will be your Venuetastic login ID and cannot be changed, so think of one that is secure and easy to remember. 6. Create a Venuetastic password. You can change your password at any time. 7. Enter your Password Reset Question and Answer. This can be used at a later time if you forget your password. 8. Enter your e-mail address. You will receive e-mail notification when new information is available in [address] 9. Click Sign Up. You can now view and download portions of your medical record. 10. Click the Download Summary menu link to download a portable copy of your medical information. If you have questions, please visit the Frequently Asked Questions section of the Venuetastic website. Remember, Venuetastic is NOT to be used for urgent needs. For medical emergencies, dial 911. Now available from your iPhone and Android! Please provide this summary of care documentation to your next provider. Your primary care clinician is listed as CHRISTINE DE LUNA. If you have any questions after today's visit, please call [phone].

## 2018-05-18 NOTE — PROGRESS NOTES
Ashvin Silva is a 48 y.o. female in today for follow-up on CPAP. Learning assessment previously completed 11/3/2015; primary language is [place]. 1. Have you been to the ER, urgent care clinic since your last visit? Hospitalized since your last visit? No    2. Have you seen or consulted any other health care providers outside of the [address] [place] [address] since your last visit? Include any pap smears or colon screening.  No

## 2018-05-18 NOTE — PROGRESS NOTES
Re:  William Garvey up visit     5/18/2018 1:17 PM    SSN: [id number]    Subjective:   30-year-old female with known history of SEFERINO coming for follow up. She reports she remains compliant on CPAP and that she rests a lot better, she does not feel as tired when she uses it and does not snore. She sometimes admits that she could fall asleep early in the sofa and this may affect hrs of use. She goes usually to bed by 10pm, up at 4am, snoozes until 4:45 feeling unrefreshed. At times does feel that her pressure is too strong. She is on OxyContin with minimal pressure of 7 maximum pressure of 20. Her download through May 13 showed 30 out of 30 days use with [id number] average, median leak at 2.6 with max at 39, median pressure of 13.7 and 95th and max pressures at 16.5 and 17.7 respectively. She complains her current mask is bothering her nose bridge a lot and wants an alternative.    Medications:     Pravastatin, citalopram, losartan, Vit D3, Ca citrate, MTV      Vital signs:    Visit Vitals    /88 (BP 1 Location: Left arm, BP Patient Position: Sitting)    Pulse (!) 57    Temp 98.2 °F (36.8 °C) (Oral)    Resp 20    Ht 5' 2\" (1.575 m)    Wt 111.7 kg (246 lb 3.2 oz)    SpO2 99%    BMI 45.03 kg/m2       Review of Systems:   As above otherwise 11 point review of systems negative including;   Constitutional no fever or chills  Skin denies rash or itching  HEENT  Denies tinnitus, hearing loss  Eyes denies diplopia vision lose  Respiratory denies sortness of breath  Cardiovascular denies chest pain, dyspnea on exertion  Gastrointestinal denies nausea, vomiting, diarrhea, constipation  Genitourinary denies incontinence  Musculoskeletal denies joint pain or swelling  Endocrine denies weight change  Hematology denies easy bruising or bleeding   Neurological as above in HPI      Patient Active Problem List   Diagnosis Code    HTN (hypertension) I10    Hyperlipidemia E78.5    Anxiety F41.9    Hypokalemia E87.6    Tinnitus of both ears H93.13    HIV exposure Z20.6    Diverticula of colon K57.30    RUQ abdominal pain R10.11    Vitamin D deficiency E55.9    Hyperlipidemia E78.5    Snoring R06.83    SEFERINO on CPAP G47.33, Z99.89    Prediabetes R73.03    Obesity, morbid (HCC) E66.01         Objective: The patient is awake, alert, and oriented x 4. Fund of knowledge is adequate. Speech is fluent and memory is intact. Cranial Nerves: III, IV, VI - Extraocular movements are intact. There is no nystagmus. VII - Face is symmetrical.  The patient moves all four limbs fairly well and symmetrically. Tone is normal. Gait is normal.        Assessment/Plan:  SEFERINO, reasonably compliant and treated, has room for improvement of nightly hrs of use. Counseled her about optimal amount of 7+ hrs. Given her occasional mild pressure intolerance and mask discomfort, I will drop the max on the auto CPAP to 16 and change her to a Resp Li Dreamwear mask, DME to help with sizing. Given these changes she will return to see me in 3 months. PLEASE NOTE:   This document has been produced using voice recognition software. Unrecognized errors in transcription may be present.

## 2018-06-13 ENCOUNTER — OFFICE VISIT (OUTPATIENT)
Dept: FAMILY MEDICINE CLINIC | Age: 53
End: 2018-06-13

## 2018-06-13 VITALS
DIASTOLIC BLOOD PRESSURE: 83 MMHG | RESPIRATION RATE: 16 BRPM | SYSTOLIC BLOOD PRESSURE: 129 MMHG | HEIGHT: 62 IN | BODY MASS INDEX: 45.64 KG/M2 | WEIGHT: 248 LBS | HEART RATE: 87 BPM | TEMPERATURE: 97 F

## 2018-06-13 DIAGNOSIS — R73.03 PREDIABETES: ICD-10-CM

## 2018-06-13 DIAGNOSIS — E78.5 HYPERLIPIDEMIA, UNSPECIFIED HYPERLIPIDEMIA TYPE: ICD-10-CM

## 2018-06-13 DIAGNOSIS — Z29.9 PREVENTIVE MEASURE: ICD-10-CM

## 2018-06-13 DIAGNOSIS — Z20.6 HIV EXPOSURE: ICD-10-CM

## 2018-06-13 DIAGNOSIS — I10 ESSENTIAL HYPERTENSION: ICD-10-CM

## 2018-06-13 DIAGNOSIS — G47.33 OSA ON CPAP: ICD-10-CM

## 2018-06-13 DIAGNOSIS — Z99.89 OSA ON CPAP: ICD-10-CM

## 2018-06-13 DIAGNOSIS — Z12.31 SCREENING MAMMOGRAM, ENCOUNTER FOR: ICD-10-CM

## 2018-06-13 DIAGNOSIS — E78.5 HYPERLIPIDEMIA, UNSPECIFIED HYPERLIPIDEMIA TYPE: Primary | ICD-10-CM

## 2018-06-13 NOTE — MR AVS SNAPSHOT
303 [facility] 
 
 
 [address] [address] [address] [address] 
[phone] Patient: Little Goodman MRN:  :[id number] Visit Information Date & Time Provider Department Dept. Phone Encounter #  
 2018  3:45 PM [id number] Misha Recinos, [address] [id number] Follow-up Instructions Return in about 3 months (around 2018) for Follow Up . Your Appointments 8/10/2018  4:00 PM  
Follow Up with Devin Reyes MD  
[facility] at [address] [facility] 365[address] [facility]) Appt Note: 3 month fu  
 [address] [address] [address] 129 [address] [facility] 630 [facility] [address] 200 [facility] Upcoming Health Maintenance Date Due FOBT Q 1 YEAR AGE 50-75 2015 Influenza Age 5 to Adult 2018 BREAST CANCER SCRN MAMMOGRAM 2018 PAP AKA CERVICAL CYTOLOGY 3/1/2020 DTaP/Tdap/Td series (2 - Td) 3/1/2027 Allergies as of 2018  Review Complete On: 2018 By: [id number] Misha Recinos,  Severity Noted Reaction Type Reactions Hydrochlorothiazide   Side Effect Other (comments) TINGLING IN BACK OF HEAD Norvasc [Amlodipine]  2010    Other (comments) Tingling in back of head Current Immunizations  Reviewed on 11/3/2016 Name Date Influenza Vaccine 11/3/2015 Influenza Vaccine (Quad) PF 10/24/2017, 2016 Influenza Vaccine PF 2013 Influenza Vaccine Split 10/26/2012, 2011 Tdap 3/1/2017 Not reviewed this visit You Were Diagnosed With   
  
 Codes Comments Hyperlipidemia, unspecified hyperlipidemia type    -  Primary ICD-10-CM: E78.5 ICD-9-CM: 272.4 Prediabetes     ICD-10-CM: R73.03 
ICD-9-CM: 790.29 Essential hypertension     ICD-10-CM: I10 
ICD-9-CM: 401.9 HIV exposure     ICD-10-CM: Z20.6 ICD-9-CM: V01.79  SEFERINO on CPAP     ICD-10-CM: G47.33, Z99.89 
 ICD-9-CM: 327.23, V46.8 Preventive measure     ICD-10-CM: Z29.9 ICD-9-CM: V07.9 Screening mammogram, encounter for     ICD-10-CM: Z12.31 
ICD-9-CM: V76.12 Vitals BP Pulse Temp Resp Height(growth percentile) Weight(growth percentile) 129/83 (BP 1 Location: Right arm, BP Patient Position: Sitting) 87 97 °F (36.1 °C) (Oral) 16 5' 2\" (1.575 m) 248 lb (112.5 kg) BMI OB Status Smoking Status 45.36 kg/m2 Hysterectomy Never Smoker BMI and BSA Data Body Mass Index Body Surface Area  
 45.36 kg/m 2 2.22 m 2 Preferred Pharmacy Pharmacy Name Phone [id number] [address] [place] [phone] Your Updated Medication List  
  
   
This list is accurate as of 6/13/18  4:37 PM.  Always use your most recent med list.  
  
  
  
  
 citalopram 10 mg tablet Commonly known as:  CELEXA  
TAKE 0.5 TABS BY MOUTH DAILY.***TOO SOON 08/28*** CITRACAL + D PO Take  by mouth daily. losartan 100 mg tablet Commonly known as:  COZAAR  
TAKE 1 TAB BY MOUTH DAILY. MULTIVITAMIN PO Take 1 Tab by mouth daily. pravastatin 40 mg tablet Commonly known as:  PRAVACHOL  
TAKE 1 TABLET BY MOUTH NIGHTLY. VITAMIN D3 2,000 unit Cap capsule Generic drug:  Cholecalciferol (Vitamin D3) 1 cap daily Follow-up Instructions Return in about 3 months (around 9/6/2018) for Follow Up . To-Do List   
 06/13/2018 Lab:  HEMOGLOBIN A1C WITH EAG   
  
 06/13/2018 Lab:  HIV 1/2 AG/AB, 4TH GENERATION,W RFLX CONFIRM   
  
 06/13/2018 Imaging:  DAVID MAMMO BI SCREENING INCL CAD   
  
 06/13/2018 Lab:  VITAMIN D, 25 HYDROXY Around 09/11/2018 Lab:  CBC WITH AUTOMATED DIFF Around 09/11/2018 Lab:  LIPID PANEL Around 09/11/2018 Lab:  METABOLIC PANEL, COMPREHENSIVE Around 09/11/2018 Lab:  SED RATE (ESR) Around 09/11/2018   Lab:  T4, FREE   
  
 Around 09/11/2018 Lab:  TSH 3RD GENERATION Patient Instructions Please contact our office if you have any questions about your visit today. 1.) Please get labs before next appointment. 2.) Return in approximately 3 months for follow up. DASH Diet: Care Instructions Your Care Instructions The DASH diet is an eating plan that can help lower your blood pressure. DASH stands for Dietary Approaches to Stop Hypertension. Hypertension is high blood pressure. The DASH diet focuses on eating foods that are high in calcium, potassium, and magnesium. These nutrients can lower blood pressure. The foods that are highest in these nutrients are fruits, vegetables, low-fat dairy products, nuts, seeds, and legumes. But taking calcium, potassium, and magnesium supplements instead of eating foods that are high in those nutrients does not have the same effect. The DASH diet also includes whole grains, fish, and poultry. The DASH diet is one of several lifestyle changes your doctor may recommend to lower your high blood pressure. Your doctor may also want you to decrease the amount of sodium in your diet. Lowering sodium while following the DASH diet can lower blood pressure even further than just the DASH diet alone. Follow-up care is a key part of your treatment and safety. Be sure to make and go to all appointments, and call your doctor if you are having problems. It's also a good idea to know your test results and keep a list of the medicines you take. How can you care for yourself at home? Following the DASH diet · Eat 4 to 5 servings of fruit each day. A serving is 1 medium-sized piece of fruit, ½ cup chopped or canned fruit, 1/4 cup dried fruit, or 4 ounces (½ cup) of fruit juice. Choose fruit more often than fruit juice. · Eat 4 to 5 servings of vegetables each day.  A serving is 1 cup of lettuce or raw leafy vegetables, ½ cup of chopped or cooked vegetables, or 4 ounces (½ cup) of vegetable juice. Choose vegetables more often than vegetable juice. · Get 2 to 3 servings of low-fat and fat-free dairy each day. A serving is 8 ounces of milk, 1 cup of yogurt, or 1 ½ ounces of cheese. · Eat 6 to 8 servings of grains each day. A serving is 1 slice of bread, 1 ounce of dry cereal, or ½ cup of cooked rice, pasta, or cooked cereal. Try to choose whole-grain products as much as possible. · Limit lean meat, poultry, and fish to 2 servings each day. A serving is 3 ounces, about the size of a deck of cards. · Eat 4 to 5 servings of nuts, seeds, and legumes (cooked dried beans, lentils, and split peas) each week. A serving is 1/3 cup of nuts, 2 tablespoons of seeds, or ½ cup of cooked beans or peas. · Limit fats and oils to 2 to 3 servings each day. A serving is 1 teaspoon of vegetable oil or 2 tablespoons of salad dressing. · Limit sweets and added sugars to 5 servings or less a week. A serving is 1 tablespoon jelly or jam, ½ cup sorbet, or 1 cup of lemonade. · Eat less than 2,300 milligrams (mg) of sodium a day. If you limit your sodium to 1,500 mg a day, you can lower your blood pressure even more. Tips for success · Start small. Do not try to make dramatic changes to your diet all at once. You might feel that you are missing out on your favorite foods and then be more likely to not follow the plan. Make small changes, and stick with them. Once those changes become habit, add a few more changes. · Try some of the following: ¨ Make it a goal to eat a fruit or vegetable at every meal and at snacks. This will make it easy to get the recommended amount of fruits and vegetables each day. ¨ Try yogurt topped with fruit and nuts for a snack or healthy dessert. ¨ Add lettuce, tomato, cucumber, and onion to sandwiches. ¨ Combine a ready-made pizza crust with low-fat mozzarella cheese and lots of vegetable toppings.  Try using tomatoes, squash, spinach, broccoli, carrots, cauliflower, and onions. ¨ Have a variety of cut-up vegetables with a low-fat dip as an appetizer instead of chips and dip. ¨ Sprinkle sunflower seeds or chopped almonds over salads. Or try adding chopped walnuts or almonds to cooked vegetables. ¨ Try some vegetarian meals using beans and peas. Add garbanzo or kidney beans to salads. Make burritos and tacos with mashed fontaine beans or black beans. Where can you learn more? Go to http://floFortyCloudhari.info/. Enter V788 in the search box to learn more about \"DASH Diet: Care Instructions. \" Current as of: September 21, 2016 Content Version: 11.4 © 7594-8370 Valeritas. Care instructions adapted under license by [employer] (which disclaims liability or warranty for this information). If you have questions about a medical condition or this instruction, always ask your healthcare professional. [place] [address] any warranty or liability for your use of this information. Learning About High Cholesterol What is high cholesterol? Cholesterol is a type of fat in your blood. It is needed for many body functions, such as making new cells. Cholesterol is made by your body. It also comes from food you eat. If you have too much cholesterol, it starts to build up in your arteries. This is called hardening of the arteries, or atherosclerosis. High cholesterol raises your risk of a heart attack and stroke. There are different types of cholesterol. LDL is the \"bad\" cholesterol. High LDL can raise your risk for heart disease, heart attack, and stroke. HDL is the \"good\" cholesterol. High HDL is linked with a lower risk for heart disease, heart attack, and stroke. Your cholesterol levels help your doctor find out your risk for having a heart attack or stroke. How can you prevent high cholesterol? A heart-healthy lifestyle can help you prevent high cholesterol.  This lifestyle helps lower your risk for a heart attack and stroke. · Eat heart-healthy foods. ¨ Eat fruits, vegetables, whole grains (like oatmeal), dried beans and peas, nuts and seeds, soy products (like tofu), and fat-free or low-fat dairy products. ¨ Replace butter, margarine, and hydrogenated or partially hydrogenated oils with olive and canola oils. (Canola oil margarine without trans fat is fine.) ¨ Replace red meat with fish, poultry, and soy protein (like tofu). ¨ Limit processed and packaged foods like chips, crackers, and cookies. · Be active. Exercise can improve your cholesterol level. Get at least 30 minutes of exercise on most days of the week. Walking is a good choice. You also may want to do other activities, such as running, swimming, cycling, or playing tennis or team sports. · Stay at a healthy weight. Lose weight if you need to. · Don't smoke. If you need help quitting, talk to your doctor about stop-smoking programs and medicines. These can increase your chances of quitting for good. How is high cholesterol treated? The goal of treatment is to reduce your chances of having a heart attack or stroke. The goal is not to lower your cholesterol numbers only. · You may make lifestyle changes, such as eating healthy foods, not smoking, losing weight, and being more active. · You may have to take medicine. Follow-up care is a key part of your treatment and safety. Be sure to make and go to all appointments, and call your doctor if you are having problems. It's also a good idea to know your test results and keep a list of the medicines you take. Where can you learn more? Go to http://flo-hari.info/. Enter L200 in the search box to learn more about \"Learning About High Cholesterol. \" Current as of: September 21, 2016 Content Version: 11.4 © 6226-9003 Healthwise, atVenu.  Care instructions adapted under license by [address] (which disclaims liability or warranty for this information). If you have questions about a medical condition or this instruction, always ask your healthcare professional. [address] any warranty or liability for your use of this information. Introducing [facility]! Sergeymirth[facility] King introduces XenoOne patient portal. Now you can access parts of your medical record, email your doctor's office, and request medication refills online. 1. In your internet browser, go to [email or web address] Information 2. Click on the First Time User? Click Here link in the Sign In box. You will see the New Member Sign Up page. 3. Enter your XenoOne Access Code exactly as it appears below. You will not need to use this code after youve completed the sign-up process. If you do not sign up before the expiration date, you must request a new code. · XenoOne Access Code: [email or web address] Expires: 8/16/2018  8:44 AM 
 
4. Enter the last four digits of your Social Security Number (xxxx) and Date of Birth (mm/dd/yyyy) as indicated and click Submit. You will be taken to the next sign-up page. 5. Create a XenoOne ID. This will be your XenoOne login ID and cannot be changed, so think of one that is secure and easy to remember. 6. Create a XenoOne password. You can change your password at any time. 7. Enter your Password Reset Question and Answer. This can be used at a later time if you forget your password. 8. Enter your e-mail address. You will receive e-mail notification when new information is available in [address] 9. Click Sign Up. You can now view and download portions of your medical record. 10. Click the Download Summary menu link to download a portable copy of your medical information. If you have questions, please visit the Frequently Asked Questions section of the XenoOne website.  Remember, XenoOne is NOT to be used for urgent needs. For medical emergencies, dial 911. Now available from your iPhone and Android! Please provide this summary of care documentation to your next provider. Your primary care clinician is listed as CHRISTINE DE LUNA. If you have any questions after today's visit, please call [phone].

## 2018-06-13 NOTE — PATIENT INSTRUCTIONS
Please contact our office if you have any questions about your visit today. 1.) Please get labs before next appointment. 2.) Return in approximately 3 months for follow up. DASH Diet: Care Instructions  Your Care Instructions    The DASH diet is an eating plan that can help lower your blood pressure. DASH stands for Dietary Approaches to Stop Hypertension. Hypertension is high blood pressure. The DASH diet focuses on eating foods that are high in calcium, potassium, and magnesium. These nutrients can lower blood pressure. The foods that are highest in these nutrients are fruits, vegetables, low-fat dairy products, nuts, seeds, and legumes. But taking calcium, potassium, and magnesium supplements instead of eating foods that are high in those nutrients does not have the same effect. The DASH diet also includes whole grains, fish, and poultry. The DASH diet is one of several lifestyle changes your doctor may recommend to lower your high blood pressure. Your doctor may also want you to decrease the amount of sodium in your diet. Lowering sodium while following the DASH diet can lower blood pressure even further than just the DASH diet alone. Follow-up care is a key part of your treatment and safety. Be sure to make and go to all appointments, and call your doctor if you are having problems. It's also a good idea to know your test results and keep a list of the medicines you take. How can you care for yourself at home? Following the DASH diet  · Eat 4 to 5 servings of fruit each day. A serving is 1 medium-sized piece of fruit, ½ cup chopped or canned fruit, 1/4 cup dried fruit, or 4 ounces (½ cup) of fruit juice. Choose fruit more often than fruit juice. · Eat 4 to 5 servings of vegetables each day. A serving is 1 cup of lettuce or raw leafy vegetables, ½ cup of chopped or cooked vegetables, or 4 ounces (½ cup) of vegetable juice. Choose vegetables more often than vegetable juice.   · Get 2 to 3 servings of low-fat and fat-free dairy each day. A serving is 8 ounces of milk, 1 cup of yogurt, or 1 ½ ounces of cheese. · Eat 6 to 8 servings of grains each day. A serving is 1 slice of bread, 1 ounce of dry cereal, or ½ cup of cooked rice, pasta, or cooked cereal. Try to choose whole-grain products as much as possible. · Limit lean meat, poultry, and fish to 2 servings each day. A serving is 3 ounces, about the size of a deck of cards. · Eat 4 to 5 servings of nuts, seeds, and legumes (cooked dried beans, lentils, and split peas) each week. A serving is 1/3 cup of nuts, 2 tablespoons of seeds, or ½ cup of cooked beans or peas. · Limit fats and oils to 2 to 3 servings each day. A serving is 1 teaspoon of vegetable oil or 2 tablespoons of salad dressing. · Limit sweets and added sugars to 5 servings or less a week. A serving is 1 tablespoon jelly or jam, ½ cup sorbet, or 1 cup of lemonade. · Eat less than 2,300 milligrams (mg) of sodium a day. If you limit your sodium to 1,500 mg a day, you can lower your blood pressure even more. Tips for success  · Start small. Do not try to make dramatic changes to your diet all at once. You might feel that you are missing out on your favorite foods and then be more likely to not follow the plan. Make small changes, and stick with them. Once those changes become habit, add a few more changes. · Try some of the following:  ¨ Make it a goal to eat a fruit or vegetable at every meal and at snacks. This will make it easy to get the recommended amount of fruits and vegetables each day. ¨ Try yogurt topped with fruit and nuts for a snack or healthy dessert. ¨ Add lettuce, tomato, cucumber, and onion to sandwiches. ¨ Combine a ready-made pizza crust with low-fat mozzarella cheese and lots of vegetable toppings. Try using tomatoes, squash, spinach, broccoli, carrots, cauliflower, and onions.   ¨ Have a variety of cut-up vegetables with a low-fat dip as an appetizer instead of chips and dip. ¨ Sprinkle sunflower seeds or chopped almonds over salads. Or try adding chopped walnuts or almonds to cooked vegetables. ¨ Try some vegetarian meals using beans and peas. Add garbanzo or kidney beans to salads. Make burritos and tacos with mashed fontaine beans or black beans. Where can you learn more? Go to http://flo-hari.info/. Enter A971 in the search box to learn more about \"DASH Diet: Care Instructions. \"  Current as of: September 21, 2016  Content Version: 11.4  © 8443-2790 [employer]. Care instructions adapted under license by [employer] (which disclaims liability or warranty for this information). If you have questions about a medical condition or this instruction, always ask your healthcare professional. [facility] any warranty or liability for your use of this information. Learning About High Cholesterol  What is high cholesterol? Cholesterol is a type of fat in your blood. It is needed for many body functions, such as making new cells. Cholesterol is made by your body. It also comes from food you eat. If you have too much cholesterol, it starts to build up in your arteries. This is called hardening of the arteries, or atherosclerosis. High cholesterol raises your risk of a heart attack and stroke. There are different types of cholesterol. LDL is the \"bad\" cholesterol. High LDL can raise your risk for heart disease, heart attack, and stroke. HDL is the \"good\" cholesterol. High HDL is linked with a lower risk for heart disease, heart attack, and stroke. Your cholesterol levels help your doctor find out your risk for having a heart attack or stroke. How can you prevent high cholesterol? A heart-healthy lifestyle can help you prevent high cholesterol. This lifestyle helps lower your risk for a heart attack and stroke. · Eat heart-healthy foods.   ¨ Eat fruits, vegetables, whole grains (like oatmeal), dried beans and peas, nuts and seeds, soy products (like tofu), and fat-free or low-fat dairy products. ¨ Replace butter, margarine, and hydrogenated or partially hydrogenated oils with olive and canola oils. (Canola oil margarine without trans fat is fine.)  ¨ Replace red meat with fish, poultry, and soy protein (like tofu). ¨ Limit processed and packaged foods like chips, crackers, and cookies. · Be active. Exercise can improve your cholesterol level. Get at least 30 minutes of exercise on most days of the week. Walking is a good choice. You also may want to do other activities, such as running, swimming, cycling, or playing tennis or team sports. · Stay at a healthy weight. Lose weight if you need to. · Don't smoke. If you need help quitting, talk to your doctor about stop-smoking programs and medicines. These can increase your chances of quitting for good. How is high cholesterol treated? The goal of treatment is to reduce your chances of having a heart attack or stroke. The goal is not to lower your cholesterol numbers only. · You may make lifestyle changes, such as eating healthy foods, not smoking, losing weight, and being more active. · You may have to take medicine. Follow-up care is a key part of your treatment and safety. Be sure to make and go to all appointments, and call your doctor if you are having problems. It's also a good idea to know your test results and keep a list of the medicines you take. Where can you learn more? Go to http://flo-hari.info/. Enter Q812 in the search box to learn more about \"Learning About High Cholesterol. \"  Current as of: September 21, 2016  Content Version: 11.4  © 5533-4169 [employer]. Care instructions adapted under license by [employer] (which disclaims liability or warranty for this information).  If you have questions about a medical condition or this instruction, always ask your healthcare professional. [employer] disclaims any warranty or liability for your use of this information.

## 2018-06-13 NOTE — PROGRESS NOTES
Chief Complaint   Patient presents with    Hypertension    Cholesterol Problem     1. Have you been to the ER, urgent care clinic since your last visit? Hospitalized since your last visit? No    2. Have you seen or consulted any other health care providers outside of the [address] [place] [address] since your last visit? Include any pap smears or colon screening.  No

## 2018-06-13 NOTE — PROGRESS NOTES
Progress Note    Patient: Diana Benson MRN: [id number]  SSN: [id number]    YOB: 1965  Age: 48 y.o. Sex: female          Subjective:   Diana Benson is a 48 y.o. female who is here for follow up. The patient mentions that she dealt with some GERD like symptoms since her last visit. She also mentions that her GI doctor told her that she has a fatty liver. She has had a colonoscopy, HIDA scan and upper endoscopy but nothing was found as to why she has RUQ abdominal pain. She mentions that she has been taking her BP meds as well as her cholesterol medications. Objective:     Past Medical History:   Diagnosis Date    Allergy     Anxiety 4/24/2010    Essential hypertension     HTN (hypertension) 4/24/2010    Hyperlipemia 4/24/2010    Hypokalemia 4/24/2010    Scoliosis         Vitals:    06/13/18 1559   BP: 129/83   Pulse: 87   Resp: 16   Temp: 97 °F (36.1 °C)   TempSrc: Oral   Weight: 248 lb (112.5 kg)   Height: 5' 2\" (1.575 m)            Review of Systems:  Pertinent items are noted in the History of Present Illness. Physical Exam:   GENERAL: alert, cooperative, no distress, appears stated age, morbidly obese  EYE: conjunctivae/corneas clear. PERRL, EOM's intact. Fundi benign  LYMPHATIC: Cervical, supraclavicular, and axillary nodes normal.   THROAT & NECK: normal and no erythema or exudates noted. LUNG: clear to auscultation bilaterally  HEART: regular rate and rhythm, S1, S2 normal, no murmur, click, rub or gallop  ABDOMEN: soft, non-tender. Bowel sounds normal. No masses,  no organomegaly, abnormal findings:  obese  EXTREMITIES:  extremities normal, atraumatic, no cyanosis or edema  SKIN: Normal.  NEUROLOGIC: negative    Lab/Data Review:  Labs ordered as noted below       Assessment:     1.) Hyperlipidemia: Patient will continue on pravastatin.      2.) Hypertension: Patient appears to be doing well on losartan.    3.) Prediabetes: HgbA1C ordered for her to get done before next visit. 4.) HIV Exposure:HIV labs ordered. 5.) Preventive: Screening mammogram ordered. Patient will return in approximately one month for follow up.      Plan:     Orders Placed This Encounter    DAVID MAMMO BI SCREENING INCL CAD    SED RATE (ESR)    CBC WITH AUTOMATED DIFF    METABOLIC PANEL, COMPREHENSIVE    LIPID PANEL    TSH 3RD GENERATION    T4, FREE    HEMOGLOBIN A1C WITH EAG    VITAMIN D, 25 HYDROXY    HIV 1/2 AG/AB, 4TH GENERATION,W RFLX CONFIRM         Signed By: Moiz Garcia DO     June 13, 2018

## 2018-06-21 RX ORDER — LOSARTAN POTASSIUM 100 MG/1
TABLET ORAL
Qty: 90 TAB | Refills: 1 | Status: SHIPPED | OUTPATIENT
Start: 2018-06-21 | End: 2018-12-13 | Stop reason: SDUPTHER

## 2018-08-22 ENCOUNTER — OFFICE VISIT (OUTPATIENT)
Dept: NEUROLOGY | Age: 53
End: 2018-08-22

## 2018-08-22 VITALS
SYSTOLIC BLOOD PRESSURE: 108 MMHG | BODY MASS INDEX: 45.49 KG/M2 | DIASTOLIC BLOOD PRESSURE: 70 MMHG | HEART RATE: 75 BPM | WEIGHT: 247.2 LBS | OXYGEN SATURATION: 97 % | HEIGHT: 62 IN | TEMPERATURE: 98.3 F | RESPIRATION RATE: 18 BRPM

## 2018-08-22 DIAGNOSIS — K21.9 GASTROESOPHAGEAL REFLUX DISEASE, ESOPHAGITIS PRESENCE NOT SPECIFIED: ICD-10-CM

## 2018-08-22 DIAGNOSIS — G47.33 OSA (OBSTRUCTIVE SLEEP APNEA): Primary | ICD-10-CM

## 2018-08-22 DIAGNOSIS — G47.00 INSOMNIA WITH SLEEP APNEA: ICD-10-CM

## 2018-08-22 DIAGNOSIS — G47.30 INSOMNIA WITH SLEEP APNEA: ICD-10-CM

## 2018-08-22 NOTE — PROGRESS NOTES
Jhon Bray is a 48 y.o. female in today for follow-up on CPAP compliance. Learning assessment previously completed; primary language is [place]. 1. Have you been to the ER, urgent care clinic since your last visit? Hospitalized since your last visit? No    2. Have you seen or consulted any other health care providers outside of the [facility] since your last visit? Include any pap smears or colon screening.  No

## 2018-08-22 NOTE — PROGRESS NOTES
HPI:  47 y/o female coming for a CC of f/u of SEFERINO. Last time here mask was blowing off her face, which is resolved with reducing the top auto CPAP max to 16. She did not get the new mask I wanted her to get. Do not have download today. She still continues to be bothered by the forehead bumper of her current mask. Has been having more reflux lately. Eats close to bedtime, likes fried chicken. Social History     Social History    Marital status:      Spouse name: N/A    Number of children: N/A    Years of education: N/A     Occupational History    Not on file.      Social History Main Topics    Smoking status: Never Smoker    Smokeless tobacco: Never Used    Alcohol use No    Drug use: No    Sexual activity: Not on file     Other Topics Concern    Not on file     Social History Narrative       Family History   Problem Relation Age of Onset    Hypertension Mother     Glaucoma Mother     Hypertension Father     Stroke Brother     Heart Disease Maternal Aunt      chf    Heart Failure Maternal Aunt      MI    Diabetes Maternal Uncle     Heart Failure Other      MI    Stroke Other     Colon Cancer Other          Past Medical History:   Diagnosis Date    Allergy     Anxiety 4/24/2010    Essential hypertension     HTN (hypertension) 4/24/2010    Hyperlipemia 4/24/2010    Hypokalemia 4/24/2010    Scoliosis        Past Surgical History:   Procedure Laterality Date    HX APPENDECTOMY  1979    HX HYSTERECTOMY  05/07/08    partial       Allergies   Allergen Reactions    Hydrochlorothiazide Other (comments)     TINGLING IN BACK OF HEAD    Norvasc [Amlodipine] Other (comments)     Tingling in back of head       Patient Active Problem List   Diagnosis Code    HTN (hypertension) I10    Hyperlipidemia E78.5    Anxiety F41.9    Hypokalemia E87.6    Tinnitus of both ears H93.13    HIV exposure Z20.6    Diverticula of colon K57.30    RUQ abdominal pain R10.11    Vitamin D deficiency E55.9  Hyperlipidemia E78.5    Snoring R06.83    SEFERINO on CPAP G47.33, Z99.89    Prediabetes R73.03    Obesity, morbid (HCC) E66.01         Review of Systems:   Constitutional: no fever or chills  Respiratory: denies shortness of breath  Cardiovascular: denies chest pain, dyspnea on exertion  Gastrointestinal: reports heartburn  Musculoskeletal: does not report joint pain or swelling        PHYSICAL EXAMINATION:      VITAL SIGNS:    Visit Vitals    /70 (BP 1 Location: Right arm, BP Patient Position: Sitting)    Pulse 75    Temp 98.3 °F (36.8 °C) (Oral)    Resp 18    Ht 5' 2\" (1.575 m)    Wt 112.1 kg (247 lb 3.2 oz)    SpO2 97%    BMI 45.21 kg/m2       EXAM: Alert, in NAD. Heart regular. The patient is awake, alert, and oriented x 4. Fund of knowledge is adequate. Speech is fluent and memory is intact. Extraocular movements are intact. There is no nystagmus. Face is symmetrical.  The patient moves all four limbs fairly well and symmetrically. Gait is normal.      Impression: SEFERINO, more comfortable with adjustment of top auto pressure, but still mask causing forehead discomfort. Plan: 1-Will pursue download with [employer], request a [employer]asana FF mask   2-F/U 3 months with another download. PLEASE NOTE:   Portions of this document may have been produced using voice recognition software. Unrecognized errors in transcription may be present. This note will not be viewable in [address]

## 2018-08-22 NOTE — MR AVS SNAPSHOT
[address] [facility] [address] 
 
 
 [address] [facility] [address] 
[phone] Patient: Josef Guardado MRN:  [id number] Visit Information Date & Time Provider Department Dept. Phone Encounter #  
 2018  4:00 PM Shira Meneses MD [address] at [address] [facility] [id number] Follow-up Instructions Return in about 3 months (around 2018). Follow-up and Disposition History Your Appointments 2018  4:00 PM  
Follow Up with Shira Meneses MD  
[address] at [address] [facility] [address] [facility]) Appt Note: 3 month fu  
 [address] [place] [place] [address] [facility] Upcoming Health Maintenance Date Due FOBT Q 1 YEAR AGE 50-75 2015 Influenza Age 5 to Adult 2018 BREAST CANCER SCRN MAMMOGRAM 2018 PAP AKA CERVICAL CYTOLOGY 3/1/2020 DTaP/Tdap/Td series (2 - Td) 3/1/2027 Allergies as of 2018  Review Complete On: 2018 By: Samantha Coombs LPN Severity Noted Reaction Type Reactions Hydrochlorothiazide   Side Effect Other (comments) TINGLING IN BACK OF HEAD Norvasc [Amlodipine]  2010    Other (comments) Tingling in back of head Current Immunizations  Reviewed on 11/3/2016 Name Date Influenza Vaccine 11/3/2015 Influenza Vaccine (Quad) PF 10/24/2017, 2016 Influenza Vaccine PF 2013 Influenza Vaccine Split 10/26/2012, 2011 Tdap 3/1/2017 Not reviewed this visit You Were Diagnosed With   
  
 Codes Comments SEFERINO (obstructive sleep apnea)    -  Primary ICD-10-CM: G47.33 
ICD-9-CM: 327.23  Class 3 obesity with body mass index (BMI) of 45.0 to 49.9 in adult, unspecified obesity type, unspecified whether serious comorbidity present ([facility]     ICD-10-CM: E66.9, Z68.42 
ICD-9-CM: 278.00, V85.42   
 Insomnia with sleep apnea     ICD-10-CM: G47.00, G47.30 ICD-9-CM: 780.51 Gastroesophageal reflux disease, esophagitis presence not specified     ICD-10-CM: K21.9 ICD-9-CM: 530.81 Vitals BP Pulse Temp Resp Height(growth percentile) Weight(growth percentile) 108/70 (BP 1 Location: Right arm, BP Patient Position: Sitting) 75 98.3 °F (36.8 °C) (Oral) 18 5' 2\" (1.575 m) 247 lb 3.2 oz (112.1 kg) SpO2 BMI OB Status Smoking Status 97% 45.21 kg/m2 Hysterectomy Never Smoker Vitals History BMI and BSA Data Body Mass Index Body Surface Area  
 45.21 kg/m 2 2.21 m 2 Preferred Pharmacy Pharmacy Name Phone [id number] [place], [facility],[address] [facility] [phone] Your Updated Medication List  
  
   
This list is accurate as of 8/22/18  4:27 PM.  Always use your most recent med list.  
  
  
  
  
 citalopram 10 mg tablet Commonly known as:  CELEXA  
TAKE 0.5 TABS BY MOUTH DAILY.***TOO SOON 08/28*** CITRACAL + D PO Take  by mouth daily. losartan 100 mg tablet Commonly known as:  COZAAR  
TAKE 1 TAB BY MOUTH DAILY. MULTIVITAMIN PO Take 1 Tab by mouth daily. pravastatin 40 mg tablet Commonly known as:  PRAVACHOL  
TAKE 1 TABLET BY MOUTH NIGHTLY. VITAMIN D3 2,000 unit Cap capsule Generic drug:  Cholecalciferol (Vitamin D3) 1 cap daily We Performed the Following AMB SUPPLY ORDER [[id number] Custom] Comments:  
 Please change mask to Resp Dreamwear Full face and update supplies Follow-up Instructions Return in about 3 months (around 11/22/2018). Introducing hospitals & HEALTH SERVICES! Michelle Jefferson introduces [employer] patient portal. Now you can access parts of your medical record, email your doctor's office, and request medication refills online. 1. In your internet browser, go to [email or web address] 2. Click on the First Time User? Click Here link in the Sign In box. You will see the New Member Sign Up page. 3. Enter your [employer] Access Code exactly as it appears below. You will not need to use this code after youve completed the sign-up process. If you do not sign up before the expiration date, you must request a new code. · [employer] Access Code: [email or web address] Expires: 11/20/2018  3:39 PM 
 
4. Enter the last four digits of your Social Security Number (xxxx) and Date of Birth (mm/dd/yyyy) as indicated and click Submit. You will be taken to the next sign-up page. 5. Create a [employer] ID. This will be your [employer] login ID and cannot be changed, so think of one that is secure and easy to remember. 6. Create a [employer] password. You can change your password at any time. 7. Enter your Password Reset Question and Answer. This can be used at a later time if you forget your password. 8. Enter your e-mail address. You will receive e-mail notification when new information is available in [address] 9. Click Sign Up. You can now view and download portions of your medical record. 10. Click the Download Summary menu link to download a portable copy of your medical information. If you have questions, please visit the Frequently Asked Questions section of the [employer] website. Remember, [employer] is NOT to be used for urgent needs. For medical emergencies, dial 911. Now available from your iPhone and Android! Please provide this summary of care documentation to your next provider. Your primary care clinician is listed as CHRISTINE DE LUNA. If you have any questions after today's visit, please call [phone].

## 2018-08-23 ENCOUNTER — TELEPHONE (OUTPATIENT)
Dept: NEUROLOGY | Age: 53
End: 2018-08-23

## 2018-08-23 NOTE — TELEPHONE ENCOUNTER
Received CPAP Usage Report from [facility] in Dr. Cheek Batch folder @ [address] [place] [address]

## 2018-08-29 ENCOUNTER — DOCUMENTATION ONLY (OUTPATIENT)
Dept: NEUROLOGY | Age: 53
End: 2018-08-29

## 2018-09-07 ENCOUNTER — HOSPITAL ENCOUNTER (OUTPATIENT)
Dept: LAB | Age: 53
Discharge: HOME | End: 2018-09-07

## 2018-09-07 LAB — XX-LABCORP SPECIMEN COL,LCBCF: NORMAL

## 2018-09-07 PROCEDURE — 99001 SPECIMEN HANDLING PT-LAB: CPT | Performed by: INTERNAL MEDICINE

## 2018-09-08 LAB
25(OH)D3+25(OH)D2 SERPL-MCNC: 37.3 NG/ML (ref 30–100)
ALBUMIN SERPL-MCNC: 4.2 G/DL (ref 3.5–5.5)
ALBUMIN/GLOB SERPL: 1.7 {RATIO} (ref 1.2–2.2)
ALP SERPL-CCNC: 90 IU/L (ref 39–117)
ALT SERPL-CCNC: 12 IU/L (ref 0–32)
AST SERPL-CCNC: 16 IU/L (ref 0–40)
BASOPHILS # BLD AUTO: 0 X10E3/UL (ref 0–0.2)
BASOPHILS NFR BLD AUTO: 0 %
BILIRUB SERPL-MCNC: 0.2 MG/DL (ref 0–1.2)
BUN SERPL-MCNC: 10 MG/DL (ref 6–24)
BUN/CREAT SERPL: 16 (ref 9–23)
CALCIUM SERPL-MCNC: 9.3 MG/DL (ref 8.7–10.2)
CHLORIDE SERPL-SCNC: 103 MMOL/L (ref 96–106)
CHOLEST SERPL-MCNC: 151 MG/DL (ref 100–199)
CO2 SERPL-SCNC: 22 MMOL/L (ref 20–29)
CREAT SERPL-MCNC: 0.63 MG/DL (ref 0.57–1)
EOSINOPHIL # BLD AUTO: 0.1 X10E3/UL (ref 0–0.4)
EOSINOPHIL NFR BLD AUTO: 2 %
ERYTHROCYTE [DISTWIDTH] IN BLOOD BY AUTOMATED COUNT: 14.9 % (ref 12.3–15.4)
ERYTHROCYTE [SEDIMENTATION RATE] IN BLOOD BY WESTERGREN METHOD: 45 MM/HR (ref 0–40)
EST. AVERAGE GLUCOSE BLD GHB EST-MCNC: 131 MG/DL
GLOBULIN SER CALC-MCNC: 2.5 G/DL (ref 1.5–4.5)
GLUCOSE SERPL-MCNC: 80 MG/DL (ref 65–99)
HBA1C MFR BLD: 6.2 % (ref 4.8–5.6)
HCT VFR BLD AUTO: 34.7 % (ref 34–46.6)
HDLC SERPL-MCNC: 57 MG/DL
HGB BLD-MCNC: 11.1 G/DL (ref 11.1–15.9)
HIV 1+2 AB+HIV1 P24 AG SERPL QL IA: NON REACTIVE
IMM GRANULOCYTES # BLD: 0 X10E3/UL (ref 0–0.1)
IMM GRANULOCYTES NFR BLD: 0 %
INTERPRETATION, 910389: NORMAL
LDLC SERPL CALC-MCNC: 82 MG/DL (ref 0–99)
LYMPHOCYTES # BLD AUTO: 2.8 X10E3/UL (ref 0.7–3.1)
LYMPHOCYTES NFR BLD AUTO: 50 %
MCH RBC QN AUTO: 25.3 PG (ref 26.6–33)
MCHC RBC AUTO-ENTMCNC: 32 G/DL (ref 31.5–35.7)
MCV RBC AUTO: 79 FL (ref 79–97)
MONOCYTES # BLD AUTO: 0.4 X10E3/UL (ref 0.1–0.9)
MONOCYTES NFR BLD AUTO: 8 %
NEUTROPHILS # BLD AUTO: 2.2 X10E3/UL (ref 1.4–7)
NEUTROPHILS NFR BLD AUTO: 40 %
PLATELET # BLD AUTO: 268 X10E3/UL (ref 150–379)
POTASSIUM SERPL-SCNC: 4.2 MMOL/L (ref 3.5–5.2)
PROT SERPL-MCNC: 6.7 G/DL (ref 6–8.5)
RBC # BLD AUTO: 4.38 X10E6/UL (ref 3.77–5.28)
SODIUM SERPL-SCNC: 142 MMOL/L (ref 134–144)
T4 FREE SERPL-MCNC: 1.14 NG/DL (ref 0.82–1.77)
TRIGL SERPL-MCNC: 61 MG/DL (ref 0–149)
TSH SERPL DL<=0.005 MIU/L-ACNC: 2.63 UIU/ML (ref 0.45–4.5)
VLDLC SERPL CALC-MCNC: 12 MG/DL (ref 5–40)
WBC # BLD AUTO: 5.6 X10E3/UL (ref 3.4–10.8)

## 2018-09-11 ENCOUNTER — OFFICE VISIT (OUTPATIENT)
Dept: FAMILY MEDICINE CLINIC | Age: 53
End: 2018-09-11

## 2018-09-11 VITALS
DIASTOLIC BLOOD PRESSURE: 74 MMHG | TEMPERATURE: 99.2 F | BODY MASS INDEX: 45.18 KG/M2 | SYSTOLIC BLOOD PRESSURE: 122 MMHG | WEIGHT: 245.5 LBS | HEART RATE: 69 BPM | RESPIRATION RATE: 20 BRPM | HEIGHT: 62 IN

## 2018-09-11 DIAGNOSIS — G47.33 OSA ON CPAP: ICD-10-CM

## 2018-09-11 DIAGNOSIS — Z23 ENCOUNTER FOR IMMUNIZATION: ICD-10-CM

## 2018-09-11 DIAGNOSIS — Z29.9 PREVENTIVE MEASURE: ICD-10-CM

## 2018-09-11 DIAGNOSIS — I10 ESSENTIAL HYPERTENSION: Primary | ICD-10-CM

## 2018-09-11 DIAGNOSIS — G89.29 CHRONIC PAIN OF LEFT KNEE: ICD-10-CM

## 2018-09-11 DIAGNOSIS — E78.5 HYPERLIPIDEMIA, UNSPECIFIED HYPERLIPIDEMIA TYPE: ICD-10-CM

## 2018-09-11 DIAGNOSIS — M25.562 CHRONIC PAIN OF LEFT KNEE: ICD-10-CM

## 2018-09-11 DIAGNOSIS — R73.03 PREDIABETES: ICD-10-CM

## 2018-09-11 DIAGNOSIS — I10 ESSENTIAL HYPERTENSION: ICD-10-CM

## 2018-09-11 DIAGNOSIS — Z71.2 ENCOUNTER TO DISCUSS TEST RESULTS: ICD-10-CM

## 2018-09-11 DIAGNOSIS — Z99.89 OSA ON CPAP: ICD-10-CM

## 2018-09-11 DIAGNOSIS — Z20.6 HIV EXPOSURE: ICD-10-CM

## 2018-09-11 NOTE — PROGRESS NOTES
Pool tSanley 1965 female who presents for routine immunizations. Patient denies any symptoms , reactions or allergies that would exclude them from being immunized today. Risks and adverse reactions were discussed and the VIS was given to them. All questions were addressed. Order placed for Flu vaccine,  per Verbal Order from ALONZO Russell FNAVEL with read back. Patient was observed for 15 min post injection. There were no reactions observed. Rachel Blanton.  Shyann Herringo, BHAVINN

## 2018-09-11 NOTE — PATIENT INSTRUCTIONS
Please contact our office if you have any questions about your visit today. Vaccine Information Statement    Influenza (Flu) Vaccine (Inactivated or Recombinant): What you need to know    Many Vaccine Information Statements are available in [place] and other languages. See www.immunize.org/vis  Hojas de Información Sobre Vacunas están disponibles en Español y en muchos otros idiomas. Visite www.immunize.org/vis    1. Why get vaccinated? Influenza (flu) is a contagious disease that spreads around the [facility] every year, usually between October and May. Flu is caused by influenza viruses, and is spread mainly by coughing, sneezing, and close contact. Anyone can get flu. Flu strikes suddenly and can last several days. Symptoms vary by age, but can include:   fever/chills   sore throat   muscle aches   fatigue   cough   headache    runny or stuffy nose    Flu can also lead to pneumonia and blood infections, and cause diarrhea and seizures in children. If you have a medical condition, such as heart or lung disease, flu can make it worse. Flu is more dangerous for some people. Infants and young children, people 72years of age and older, pregnant women, and people with certain health conditions or a weakened immune system are at greatest risk. Each year thousands of people in the [facility] die from flu, and many more are hospitalized. Flu vaccine can:   keep you from getting flu,   make flu less severe if you do get it, and   keep you from spreading flu to your family and other people. 2. Inactivated and recombinant flu vaccines    A dose of flu vaccine is recommended every flu season. Children 6 months through 6years of age may need two doses during the same flu season. Everyone else needs only one dose each flu season.        Some inactivated flu vaccines contain a very small amount of a mercury-based preservative called thimerosal. Studies have not shown thimerosal in vaccines to be harmful, but flu vaccines that do not contain thimerosal are available. There is no live flu virus in flu shots. They cannot cause the flu. There are many flu viruses, and they are always changing. Each year a new flu vaccine is made to protect against three or four viruses that are likely to cause disease in the upcoming flu season. But even when the vaccine doesnt exactly match these viruses, it may still provide some protection    Flu vaccine cannot prevent:   flu that is caused by a virus not covered by the vaccine, or   illnesses that look like flu but are not. It takes about 2 weeks for protection to develop after vaccination, and protection lasts through the flu season. 3. Some people should not get this vaccine    Tell the person who is giving you the vaccine:     If you have any severe, life-threatening allergies. If you ever had a life-threatening allergic reaction after a dose of flu vaccine, or have a severe allergy to any part of this vaccine, you may be advised not to get vaccinated. Most, but not all, types of flu vaccine contain a small amount of egg protein.  If you ever had Guillain-Barré Syndrome (also called GBS). Some people with a history of GBS should not get this vaccine. This should be discussed with your doctor.  If you are not feeling well. It is usually okay to get flu vaccine when you have a mild illness, but you might be asked to come back when you feel better. 4. Risks of a vaccine reaction    With any medicine, including vaccines, there is a chance of reactions. These are usually mild and go away on their own, but serious reactions are also possible. Most people who get a flu shot do not have any problems with it.      Minor problems following a flu shot include:    soreness, redness, or swelling where the shot was given     hoarseness   sore, red or itchy eyes   cough   fever   aches   headache   itching   fatigue  If these problems occur, they usually begin soon after the shot and last 1 or 2 days. More serious problems following a flu shot can include the following:     There may be a small increased risk of Guillain-Barré Syndrome (GBS) after inactivated flu vaccine. This risk has been estimated at 1 or 2 additional cases per million people vaccinated. This is much lower than the risk of severe complications from flu, which can be prevented by flu vaccine.  Young children who get the flu shot along with pneumococcal vaccine (PCV13) and/or DTaP vaccine at the same time might be slightly more likely to have a seizure caused by fever. Ask your doctor for more information. Tell your doctor if a child who is getting flu vaccine has ever had a seizure. Problems that could happen after any injected vaccine:      People sometimes faint after a medical procedure, including vaccination. Sitting or lying down for about 15 minutes can help prevent fainting, and injuries caused by a fall. Tell your doctor if you feel dizzy, or have vision changes or ringing in the ears.  Some people get severe pain in the shoulder and have difficulty moving the arm where a shot was given. This happens very rarely.  Any medication can cause a severe allergic reaction. Such reactions from a vaccine are very rare, estimated at about 1 in a million doses, and would happen within a few minutes to a few hours after the vaccination. As with any medicine, there is a very remote chance of a vaccine causing a serious injury or death. The safety of vaccines is always being monitored. For more information, visit: www.cdc.gov/vaccinesafety/    5. What if there is a serious reaction? What should I look for?  Look for anything that concerns you, such as signs of a severe allergic reaction, very high fever, or unusual behavior.     Signs of a severe allergic reaction can include hives, swelling of the face and throat, difficulty breathing, a fast heartbeat, dizziness, and weakness - usually within a few minutes to a few hours after the vaccination. What should I do?  If you think it is a severe allergic reaction or other emergency that cant wait, call - and get the person to the nearest hospital. Otherwise, call your doctor.  Reactions should be reported to the Vaccine Adverse Event Reporting System (VAERS). Your doctor should file this report, or you can do it yourself through  the VAERS web site at www.vaers. [facility].gov, or by calling 3-847.721.8693. VAERS does not give medical advice. 6. The National Vaccine Injury Compensation Program    The [facility] Vaccine Injury Compensation Program (VICP) is a federal program that was created to compensate people who may have been injured by certain vaccines. Persons who believe they may have been injured by a vaccine can learn about the program and about filing a claim by calling 2-609.780.4959 or visiting the [facility] website at www.[facility].gov/vaccinecompensation. There is a time limit to file a claim for compensation. 7. How can I learn more?  Ask your healthcare provider. He or she can give you the vaccine package insert or suggest other sources of information.  Call your local or state health department.  Contact the Centers for Disease Control and Prevention (CDC):  - Call 0-608.158.7767 (1-800-CDC-INFO) or  - Visit CDCs website at www.cdc.gov/flu    Vaccine Information Statement   Inactivated Influenza Vaccine   2015  [address] [id number]    Department of Health and Human Services  Centers for Disease Control and Prevention    Office Use Only       Joint Pain: Care Instructions  Your Care Instructions    Many people have small aches and pains from overuse or injury to muscles and joints. Joint injuries often happen during sports or recreation, work tasks, or projects around the home.  An overuse injury can happen when you put too much stress on a joint or when you do an activity that stresses the joint over and over, such as using the computer or rowing a boat. You can take action at home to help your muscles and joints get better. You should feel better in 1 to 2 weeks, but it can take 3 months or more to heal completely. Follow-up care is a key part of your treatment and safety. Be sure to make and go to all appointments, and call your doctor if you are having problems. It's also a good idea to know your test results and keep a list of the medicines you take. How can you care for yourself at home? · Do not put weight on the injured joint for at least a day or two. · For the first day or two after an injury, do not take hot showers or baths, and do not use hot packs. The heat could make swelling worse. · Put ice or a cold pack on the sore joint for 10 to 20 minutes at a time. Try to do this every 1 to 2 hours for the next 3 days (when you are awake) or until the swelling goes down. Put a thin cloth between the ice and your skin. · Wrap the injury in an elastic bandage. Do not wrap it too tightly because this can cause more swelling. · Prop up the sore joint on a pillow when you ice it or anytime you sit or lie down during the next 3 days. Try to keep it above the level of your heart. This will help reduce swelling. · Take an over-the-counter pain medicine, such as acetaminophen (Tylenol), ibuprofen (Advil, Motrin), or naproxen (Aleve). Read and follow all instructions on the label. · After 1 or 2 days of rest, begin moving the joint gently. While the joint is still healing, you can begin to exercise using activities that do not strain or hurt the painful joint. When should you call for help? Call your doctor now or seek immediate medical care if:    · You have signs of infection, such as:  ¨ Increased pain, swelling, warmth, and redness. ¨ Red streaks leading from the joint.   ¨ A fever.   Lucas Dumont closely for changes in your health, and be sure to contact your doctor if:    · Your movement or symptoms are not getting better after 1 to 2 weeks of home treatment. Where can you learn more? Go to http://flo-hari.info/. Enter P205 in the search box to learn more about \"Joint Pain: Care Instructions. \"  Current as of: November 29, 2017  Content Version: 11.7  © 2943-3267 [employer]. Care instructions adapted under license by [employer] (which disclaims liability or warranty for this information). If you have questions about a medical condition or this instruction, always ask your healthcare professional. [address] any warranty or liability for your use of this information.

## 2018-09-11 NOTE — MR AVS SNAPSHOT
[facility] 
 
 
 [address] [address] [address] [id number] [phone] Patient: Megan Rivera MRN:  [id number] Visit Information Date & Time Provider Department Dept. Phone Encounter #  
 2018 10:00 AM Susannah Valdez NP [address] [id number] Follow-up Instructions Return in about 4 weeks (around 10/9/2018), or if symptoms worsen or fail to improve. Your Appointments 2018  4:00 PM  
Follow Up with Lida Moore MD  
[facility] at [address] [facility] 365[address] [facility]) Appt Note: 3 month fu  
 [facility] [address] [address] [address] [facility] 630 [facility] [address] 200 [facility] Upcoming Health Maintenance Date Due FOBT Q 1 YEAR AGE 50-75 2015 Influenza Age 5 to Adult 2018 BREAST CANCER SCRN MAMMOGRAM 2018 PAP AKA CERVICAL CYTOLOGY 3/1/2020 DTaP/Tdap/Td series (2 - Td) 3/1/2027 Allergies as of 2018  Review Complete On: 2018 By: Prakash Hanson LPN Severity Noted Reaction Type Reactions Hydrochlorothiazide   Side Effect Other (comments) TINGLING IN BACK OF HEAD Norvasc [Amlodipine]  2010    Other (comments) Tingling in back of head Current Immunizations  Reviewed on 11/3/2016 Name Date Influenza Vaccine 11/3/2015 Influenza Vaccine (Quad) PF  Incomplete, 10/24/2017, 2016 Influenza Vaccine PF 2013 Influenza Vaccine Split 10/26/2012, 2011 Tdap 3/1/2017 Not reviewed this visit You Were Diagnosed With   
  
 Codes Comments Essential hypertension    -  Primary ICD-10-CM: I10 
ICD-9-CM: 401.9 Hyperlipidemia, unspecified hyperlipidemia type     ICD-10-CM: E78.5 ICD-9-CM: 272.4 Prediabetes     ICD-10-CM: R73.03 
ICD-9-CM: 790.29  Chronic pain of left knee     ICD-10-CM: M25.562, G89.29 
 ICD-9-CM: 719.46, 338.29 Encounter to discuss test results     ICD-10-CM: Z71.89 ICD-9-CM: V65.49 Encounter for immunization     ICD-10-CM: U64 ICD-9-CM: V03.89 Vitals BP Pulse Temp Resp Height(growth percentile) Weight(growth percentile) 122/74 (BP 1 Location: Left arm, BP Patient Position: Sitting) 69 99.2 °F (37.3 °C) (Oral) 20 5' 2\" (1.575 m) 245 lb 8 oz (111.4 kg) BMI OB Status Smoking Status 44.9 kg/m2 Hysterectomy Never Smoker BMI and BSA Data Body Mass Index Body Surface Area 44.9 kg/m 2 2.21 m 2 Preferred Pharmacy Pharmacy Name Phone [id number] Amadeo Goldberg, [facility] [phone] Your Updated Medication List  
  
   
This list is accurate as of 9/11/18 10:39 AM.  Always use your most recent med list.  
  
  
  
  
 citalopram 10 mg tablet Commonly known as:  CELEXA  
TAKE 0.5 TABS BY MOUTH DAILY.***TOO SOON 08/28*** CITRACAL + D PO Take  by mouth daily. losartan 100 mg tablet Commonly known as:  COZAAR  
TAKE 1 TAB BY MOUTH DAILY. MULTIVITAMIN PO Take 1 Tab by mouth daily. pravastatin 40 mg tablet Commonly known as:  PRAVACHOL  
TAKE 1 TABLET BY MOUTH NIGHTLY. VITAMIN D3 2,000 unit Cap capsule Generic drug:  Cholecalciferol (Vitamin D3) 1 cap daily We Performed the Following INFLUENZA VIRUS VAC QUAD,SPLIT,PRESV FREE SYRINGE IM [id number] CPT(R)] Follow-up Instructions Return in about 4 weeks (around 10/9/2018), or if symptoms worsen or fail to improve. Patient Instructions Please contact our office if you have any questions about your visit today. Vaccine Information Statement Influenza (Flu) Vaccine (Inactivated or Recombinant): What you need to know Many Vaccine Information Statements are available in [place]ian and other languages. See www.immunize.org/vis Hojas de Información Sobre Vacunas están disponibles en Español y en muchos otros idiomas. Visite www.immunize.org/vis 1. Why get vaccinated? Influenza (flu) is a contagious disease that spreads around the [place] every year, usually between October and May. Flu is caused by influenza viruses, and is spread mainly by coughing, sneezing, and close contact. Anyone can get flu. Flu strikes suddenly and can last several days. Symptoms vary by age, but can include: 
 fever/chills  sore throat  muscle aches  fatigue  cough  headache  runny or stuffy nose Flu can also lead to pneumonia and blood infections, and cause diarrhea and seizures in children. If you have a medical condition, such as heart or lung disease, flu can make it worse. Flu is more dangerous for some people. Infants and young children, people 72years of age and older, pregnant women, and people with certain health conditions or a weakened immune system are at greatest risk. Each year thousands of people in the [facility] die from flu, and many more are hospitalized. Flu vaccine can: 
 keep you from getting flu, 
 make flu less severe if you do get it, and 
 keep you from spreading flu to your family and other people. 2. Inactivated and recombinant flu vaccines A dose of flu vaccine is recommended every flu season. Children 6 months through 6years of age may need two doses during the same flu season. Everyone else needs only one dose each flu season. Some inactivated flu vaccines contain a very small amount of a mercury-based preservative called thimerosal. Studies have not shown thimerosal in vaccines to be harmful, but flu vaccines that do not contain thimerosal are available. There is no live flu virus in flu shots. They cannot cause the flu. There are many flu viruses, and they are always changing.  Each year a new flu vaccine is made to protect against three or four viruses that are likely to cause disease in the upcoming flu season. But even when the vaccine doesnt exactly match these viruses, it may still provide some protection Flu vaccine cannot prevent: 
 flu that is caused by a virus not covered by the vaccine, or 
 illnesses that look like flu but are not. It takes about 2 weeks for protection to develop after vaccination, and protection lasts through the flu season. 3. Some people should not get this vaccine Tell the person who is giving you the vaccine:  If you have any severe, life-threatening allergies. If you ever had a life-threatening allergic reaction after a dose of flu vaccine, or have a severe allergy to any part of this vaccine, you may be advised not to get vaccinated. Most, but not all, types of flu vaccine contain a small amount of egg protein.  If you ever had Guillain-Barré Syndrome (also called GBS). Some people with a history of GBS should not get this vaccine. This should be discussed with your doctor.  If you are not feeling well. It is usually okay to get flu vaccine when you have a mild illness, but you might be asked to come back when you feel better. 4. Risks of a vaccine reaction With any medicine, including vaccines, there is a chance of reactions. These are usually mild and go away on their own, but serious reactions are also possible. Most people who get a flu shot do not have any problems with it. Minor problems following a flu shot include:  
 soreness, redness, or swelling where the shot was given  hoarseness  sore, red or itchy eyes  cough  fever  aches  headache  itching  fatigue If these problems occur, they usually begin soon after the shot and last 1 or 2 days. More serious problems following a flu shot can include the following:  There may be a small increased risk of Guillain-Barré Syndrome (GBS) after inactivated flu vaccine. This risk has been estimated at 1 or 2 additional cases per million people vaccinated. This is much lower than the risk of severe complications from flu, which can be prevented by flu vaccine.  Young children who get the flu shot along with pneumococcal vaccine (PCV13) and/or DTaP vaccine at the same time might be slightly more likely to have a seizure caused by fever. Ask your doctor for more information. Tell your doctor if a child who is getting flu vaccine has ever had a seizure. Problems that could happen after any injected vaccine:  People sometimes faint after a medical procedure, including vaccination. Sitting or lying down for about 15 minutes can help prevent fainting, and injuries caused by a fall. Tell your doctor if you feel dizzy, or have vision changes or ringing in the ears.  Some people get severe pain in the shoulder and have difficulty moving the arm where a shot was given. This happens very rarely.  Any medication can cause a severe allergic reaction. Such reactions from a vaccine are very rare, estimated at about 1 in a million doses, and would happen within a few minutes to a few hours after the vaccination. As with any medicine, there is a very remote chance of a vaccine causing a serious injury or death. The safety of vaccines is always being monitored. For more information, visit: www.cdc.gov/vaccinesafety/ 
 
5. What if there is a serious reaction? What should I look for?  Look for anything that concerns you, such as signs of a severe allergic reaction, very high fever, or unusual behavior. Signs of a severe allergic reaction can include hives, swelling of the face and throat, difficulty breathing, a fast heartbeat, dizziness, and weakness  usually within a few minutes to a few hours after the vaccination. What should I do?  
 
 If you think it is a severe allergic reaction or other emergency that cant wait, call 9-1-1 and get the person to the nearest hospital. Otherwise, call your doctor.  Reactions should be reported to the Vaccine Adverse Event Reporting System (VAERS). Your doctor should file this report, or you can do it yourself through  the VAERS web site at www.vaers. [facility].gov, or by calling 3-567.364.7060. VAERS does not give medical advice. 6. The National Vaccine Injury Compensation Program 
 
The [facility] Vaccine Injury Compensation Program (VICP) is a federal program that was created to compensate people who may have been injured by certain vaccines. Persons who believe they may have been injured by a vaccine can learn about the program and about filing a claim by calling 2-736.271.9702 or visiting the [employer] website at www.[facility].gov/vaccinecompensation. There is a time limit to file a claim for compensation. 7. How can I learn more?  Ask your healthcare provider. He or she can give you the vaccine package insert or suggest other sources of information.  Call your local or state health department.  Contact the Centers for Disease Control and Prevention (CDC): 
- Call 4-275.284.3921 (1-800-CDC-INFO) or 
- Visit CDCs website at www.cdc.gov/flu Vaccine Information Statement Inactivated Influenza Vaccine 8/7/2015 
[address] 715JN-03 Department of Health and [employer] Centers for Disease Control and Prevention Office Use Only Joint Pain: Care Instructions Your Care Instructions Many people have small aches and pains from overuse or injury to muscles and joints. Joint injuries often happen during sports or recreation, work tasks, or projects around the home. An overuse injury can happen when you put too much stress on a joint or when you do an activity that stresses the joint over and over, such as using the computer or rowing a boat. You can take action at home to help your muscles and joints get better. You should feel better in 1 to 2 weeks, but it can take 3 months or more to heal completely. Follow-up care is a key part of your treatment and safety. Be sure to make and go to all appointments, and call your doctor if you are having problems. It's also a good idea to know your test results and keep a list of the medicines you take. How can you care for yourself at home? · Do not put weight on the injured joint for at least a day or two. · For the first day or two after an injury, do not take hot showers or baths, and do not use hot packs. The heat could make swelling worse. · Put ice or a cold pack on the sore joint for 10 to 20 minutes at a time. Try to do this every 1 to 2 hours for the next 3 days (when you are awake) or until the swelling goes down. Put a thin cloth between the ice and your skin. · Wrap the injury in an elastic bandage. Do not wrap it too tightly because this can cause more swelling. · Prop up the sore joint on a pillow when you ice it or anytime you sit or lie down during the next 3 days. Try to keep it above the level of your heart. This will help reduce swelling. · Take an over-the-counter pain medicine, such as acetaminophen (Tylenol), ibuprofen (Advil, Motrin), or naproxen (Aleve). Read and follow all instructions on the label. · After 1 or 2 days of rest, begin moving the joint gently. While the joint is still healing, you can begin to exercise using activities that do not strain or hurt the painful joint. When should you call for help? Call your doctor now or seek immediate medical care if: 
  · You have signs of infection, such as: 
¨ Increased pain, swelling, warmth, and redness. ¨ Red streaks leading from the joint. ¨ A fever.  
 Watch closely for changes in your health, and be sure to contact your doctor if: 
  · Your movement or symptoms are not getting better after 1 to 2 weeks of home treatment. Where can you learn more? Go to http://flo-hari.info/. Enter P205 in the search box to learn more about \"Joint Pain: Care Instructions. \" Current as of: November 29, 2017 Content Version: 11.7 © 6044-0425 [employer], [employer]. Care instructions adapted under license by [employer] (which disclaims liability or warranty for this information). If you have questions about a medical condition or this instruction, always ask your healthcare professional. [address] any warranty or liability for your use of this information. Introducing [facility]! [facility] introduces EverPresent patient portal. Now you can access parts of your medical record, email your doctor's office, and request medication refills online. 1. In your internet browser, go to [email or web address] [email or web address] 2. Click on the First Time User? Click Here link in the Sign In box. You will see the New Member Sign Up page. 3. Enter your EverPresent Access Code exactly as it appears below. You will not need to use this code after youve completed the sign-up process. If you do not sign up before the expiration date, you must request a new code. · EverPresent Access Code: [email or web address] Expires: 11/20/2018  3:39 PM 
 
4. Enter the last four digits of your Social Security Number (xxxx) and Date of Birth (mm/dd/yyyy) as indicated and click Submit. You will be taken to the next sign-up page. 5. Create a EverPresent ID. This will be your EverPresent login ID and cannot be changed, so think of one that is secure and easy to remember. 6. Create a EverPresent password. You can change your password at any time. 7. Enter your Password Reset Question and Answer. This can be used at a later time if you forget your password. 8. Enter your e-mail address. You will receive e-mail notification when new information is available in [address] 9. Click Sign Up. You can now view and download portions of your medical record. 10. Click the Download Summary menu link to download a portable copy of your medical information. If you have questions, please visit the Frequently Asked Questions section of the [employer] website. Remember, [employer] is NOT to be used for urgent needs. For medical emergencies, dial 911. Now available from your iPhone and Android! Please provide this summary of care documentation to your next provider. Your primary care clinician is listed as CHRISTINE DE LUNA. If you have any questions after today's visit, please call [phone].

## 2018-09-11 NOTE — PROGRESS NOTES
HPI  Reynaldo Huertas is a 48 y.o. female  Chief Complaint   Patient presents with    Cholesterol Problem     high chol    Hypertension    Blood sugar problem     prediabetes    Results     discuss lab results   Hypertension - Denies chest pain and or shortness of breath. Denies dizziness or blurred vision. Admits to being on blood pressure and cholesterol medications. Denies ankle swelling. Admits to a history of prediabetes. Requesting lab results. Knee pain 2-3/10 pain. Denies injury or falls. Achy knee pain that occurs more in the morning. Reports pain is  2-3/10. Reports it is more like a stiffness. Reports it goes away when she sits down. Reports walking in the morning aggravates the knee. Denies taking anything for her knee pain. Past Medical History  Past Medical History:   Diagnosis Date    Allergy     Anxiety 4/24/2010    Essential hypertension     HTN (hypertension) 4/24/2010    Hyperlipemia 4/24/2010    Hypokalemia 4/24/2010    Scoliosis        Surgical History  Past Surgical History:   Procedure Laterality Date    HX APPENDECTOMY  1979    HX HYSTERECTOMY  05/07/08    partial        Medications  Current Outpatient Prescriptions   Medication Sig Dispense Refill    losartan (COZAAR) 100 mg tablet TAKE 1 TAB BY MOUTH DAILY. 90 Tab 1    pravastatin (PRAVACHOL) 40 mg tablet TAKE 1 TABLET BY MOUTH NIGHTLY. 90 Tab 3    citalopram (CELEXA) 10 mg tablet TAKE 0.5 TABS BY MOUTH DAILY. TOO SOON 08/28 45 Tab 3    Cholecalciferol, Vitamin D3, (VITAMIN D3) 2,000 unit cap 1 cap daily   30 Cap prn    CALCIUM CITRATE/VITAMIN D3 (CITRACAL + D PO) Take  by mouth daily.  MULTIVITAMINS (MULTIVITAMIN PO) Take 1 Tab by mouth daily.          Allergies  Allergies   Allergen Reactions    Hydrochlorothiazide Other (comments)     TINGLING IN BACK OF HEAD    Norvasc [Amlodipine] Other (comments)     Tingling in back of head       Family History  Family History   Problem Relation Age of Onset    Hypertension Mother     Glaucoma Mother     Hypertension Father     Stroke Brother     Heart Disease Maternal Aunt      chf    Heart Failure Maternal Aunt      MI    Diabetes Maternal Uncle     Heart Failure Other      MI    Stroke Other     Colon Cancer Other        Social History  Social History     Social History    Marital status:      Spouse name: N/A    Number of children: N/A    Years of education: N/A     Occupational History    Not on file. Social History Main Topics    Smoking status: Never Smoker    Smokeless tobacco: Never Used    Alcohol use No    Drug use: No    Sexual activity: Not on file     Other Topics Concern    Not on file     Social History Narrative       Problem List  Patient Active Problem List   Diagnosis Code    HTN (hypertension) I10    Hyperlipidemia E78.5    Anxiety F41.9    Hypokalemia E87.6    Tinnitus of both ears H93.13    HIV exposure Z20.6    Diverticula of colon K57.30    RUQ abdominal pain R10.11    Vitamin D deficiency E55.9    Hyperlipidemia E78.5    Snoring R06.83    SEFERINO on CPAP G47.33, Z99.89    Prediabetes R73.03    Obesity, morbid (HCC) E66.01       Review of Systems  Review of Systems   Eyes: Negative for blurred vision. Respiratory: Negative for shortness of breath. Cardiovascular: Negative for chest pain, palpitations and leg swelling. Musculoskeletal: Positive for joint pain. Negative for falls. Neurological: Negative for dizziness. Vital Signs  Vitals:    09/11/18 1012   BP: 122/74   Pulse: 69   Resp: 20   Temp: 99.2 °F (37.3 °C)   TempSrc: Oral   Weight: 245 lb 8 oz (111.4 kg)   Height: 5' 2\" (1.575 m)   PainSc:   0 - No pain       Physical Exam  Physical Exam   Constitutional: She is oriented to person, place, and time. HENT:   Mouth/Throat: Oropharynx is clear and moist.   Eyes: Pupils are equal, round, and reactive to light.    Cardiovascular: Normal rate, regular rhythm, normal heart sounds and intact distal pulses. No murmur heard. Pulmonary/Chest: Effort normal and breath sounds normal.   Abdominal: Soft. Bowel sounds are normal. She exhibits no distension. There is no tenderness. Musculoskeletal: She exhibits no edema. No bruising to knees bilateral. Full ROM. Neurological: She is alert and oriented to person, place, and time. Psychiatric: She has a normal mood and affect. Her behavior is normal.   Vitals reviewed. Diagnostics  Orders Placed This Encounter    Influenza virus vaccine (QUADRIVALENT PRES FREE SYRINGE) IM (27403)       Results  Results for orders placed or performed during the hospital encounter of 09/07/18   [facility] SPECIMEN COL   Result Value Ref Range    [facility]. Specimens collected/sent to [facility]. Please direct inquiries to ([phone]). Assessment and Plan  Diagnoses and all orders for this visit:    1. Essential hypertension    2. Hyperlipidemia, unspecified hyperlipidemia type    3. Prediabetes    4. Chronic pain of left knee    5. Encounter to discuss test results    6. Encounter for immunization  -     Influenza virus vaccine (QUADRIVALENT PRES FREE SYRINGE) IM (06954)      OTC tylenol for knee pain. Lab discussed in detail. Discussed importance of diet and exercise. After care summary printed and reviewed with patient. Plan reviewed with patient. Questions answered. Patient verbalized understanding of plan and is in agreement with plan. Patient to follow up in one week or earlier if knee symptoms worsen. Will re-evaluate knee pain.      Sherren Hacking, FNP-C

## 2018-09-11 NOTE — PROGRESS NOTES
Chief Complaint   Patient presents with    Cholesterol Problem     high chol    Hypertension    Blood sugar problem     prediabetes    Results     discuss lab results       Health Maintenance Due   Topic Date Due    FOBT Q 1 YEAR AGE 50-75  01/22/2015    Influenza Age 5 to Adult  08/01/2018    BREAST CANCER SCRN MAMMOGRAM  12/05/2018       Health Maintenance reviewed       1. Have you been to the ER, urgent care clinic since your last visit? Hospitalized since your last visit? No    2. Have you seen or consulted any other health care providers outside of the [address] [place] [address] since your last visit? Include any pap smears or colon screening.  No      PHQ, abuse and learning screening updated

## 2018-09-24 DIAGNOSIS — F41.8 DEPRESSION WITH ANXIETY: ICD-10-CM

## 2018-09-24 RX ORDER — CITALOPRAM 10 MG/1
TABLET ORAL
Qty: 45 TAB | Refills: 3 | Status: SHIPPED | OUTPATIENT
Start: 2018-09-24 | End: 2019-06-21 | Stop reason: SDUPTHER

## 2018-09-25 ENCOUNTER — OFFICE VISIT (OUTPATIENT)
Dept: FAMILY MEDICINE CLINIC | Age: 53
End: 2018-09-25

## 2018-09-25 VITALS
RESPIRATION RATE: 16 BRPM | DIASTOLIC BLOOD PRESSURE: 74 MMHG | BODY MASS INDEX: 46.28 KG/M2 | HEIGHT: 62 IN | SYSTOLIC BLOOD PRESSURE: 114 MMHG | TEMPERATURE: 98.9 F | HEART RATE: 66 BPM | WEIGHT: 251.5 LBS

## 2018-09-25 DIAGNOSIS — M79.662 PAIN OF LEFT CALF: ICD-10-CM

## 2018-09-25 DIAGNOSIS — R73.03 PREDIABETES: ICD-10-CM

## 2018-09-25 DIAGNOSIS — Z12.39 SCREENING FOR MALIGNANT NEOPLASM OF BREAST: ICD-10-CM

## 2018-09-25 DIAGNOSIS — Z71.2 ENCOUNTER TO DISCUSS TEST RESULTS: Primary | ICD-10-CM

## 2018-09-25 NOTE — PROGRESS NOTES
Chief Complaint Patient presents with  Follow-up  GERD  
  starting taking Prilosec which has helped  Leg Pain  
  left, states it feels 'a little better' 1. Have you been to the ER, urgent care clinic since your last visit? Hospitalized since your last visit? No 
 
2. Have you seen or consulted any other health care providers outside of the [address] [place] [address] since your last visit? Include any pap smears or colon screening.  No

## 2018-09-25 NOTE — PROGRESS NOTES
DORON 
Pete Branham is a 48 y.o. female Chief Complaint Patient presents with  Follow-up  GERD  
  starting taking Prilosec which has helped  Leg Pain  
  left, states it feels 'a little better' Reports GERD is much better. Reports she is taking Prilosec and this has helped. Reports left leg pain is a little better but now she has left calf pain that radiates to her knee. Reports her calf pain is sharp and has been present for 2 weeks. Reports current pain level is 2-3/10. Denies chest pain and or shortness of breath. Reports she has tried the OTC tylenol which has helped minimally. Reports standing and walking aggravates left knee. Questions about labs. Past Medical History Past Medical History:  
Diagnosis Date  Allergy  Anxiety 4/24/2010  Essential hypertension  HTN (hypertension) 4/24/2010  Hyperlipemia 4/24/2010  Hypokalemia 4/24/2010  Scoliosis Surgical History Past Surgical History:  
Procedure Laterality Date [address]  HX HYSTERECTOMY  05/07/08  
 partial  
  
 
Medications Current Outpatient Prescriptions Medication Sig Dispense Refill  citalopram (CELEXA) 10 mg tablet TAKE 1/2 TABLET BY MOUTH ONCE DAILY 45 Tab 3  
 losartan (COZAAR) 100 mg tablet TAKE 1 TAB BY MOUTH DAILY. 90 Tab 1  pravastatin (PRAVACHOL) 40 mg tablet TAKE 1 TABLET BY MOUTH NIGHTLY. 90 Tab 3  Cholecalciferol, Vitamin D3, (VITAMIN D3) 2,000 unit cap 1 cap daily 30 Cap prn  CALCIUM CITRATE/VITAMIN D3 (CITRACAL + D PO) Take  by mouth daily.  MULTIVITAMINS (MULTIVITAMIN PO) Take 1 Tab by mouth daily. Allergies Allergies Allergen Reactions  Hydrochlorothiazide Other (comments) TINGLING IN BACK OF HEAD  Norvasc [Amlodipine] Other (comments) Tingling in back of head Family History Family History Problem Relation Age of Onset  Hypertension Mother  Glaucoma Mother  Hypertension Father  Stroke Brother  Heart Disease Maternal Aunt   
  chf  
 Heart Failure Maternal Aunt MI  
 Diabetes Maternal Uncle  Heart Failure Other MI  
 Stroke Other  Colon Cancer Other Social History Social History Social History  Marital status:  Spouse name: N/A  
 Number of children: N/A  
 Years of education: N/A Occupational History  Not on file. Social History Main Topics  Smoking status: Never Smoker  Smokeless tobacco: Never Used  Alcohol use No  
 Drug use: No  
 Sexual activity: Not on file Other Topics Concern  Not on file Social History Narrative Problem List 
Patient Active Problem List  
Diagnosis Code  
 HTN (hypertension) I10  
 Hyperlipidemia E78.5  Anxiety F41.9  Hypokalemia E87.6  Tinnitus of both ears H93.13  
 HIV exposure Z20.6  Diverticula of colon K57.30  RUQ abdominal pain R10.11  Vitamin D deficiency E55.9  Hyperlipidemia E78.5  Snoring R06.83  
 SEFERINO on CPAP G47.33, Z99.89  
 Prediabetes R73.03  
 Obesity, morbid (HCC) E66.01 Review of Systems Review of Systems Constitutional: Negative for chills and fever. Respiratory: Negative for shortness of breath. Cardiovascular: Negative for chest pain. Gastrointestinal: Negative for abdominal pain, blood in stool, heartburn, nausea and vomiting. Genitourinary: Negative for hematuria. Vital Signs Vitals:  
 09/25/18 1533 BP: 114/74 Pulse: 66 Resp: 16 Temp: 98.9 °F (37.2 °C) TempSrc: Oral  
Weight: 251 lb 8 oz (114.1 kg) Height: 5' 2\" (1.575 m) PainSc:   0 - No pain Physical Exam 
Physical Exam  
Constitutional: She is oriented to person, place, and time. HENT:  
Mouth/Throat: Oropharynx is clear and moist.  
Eyes: Pupils are equal, round, and reactive to light. Cardiovascular: Normal rate, regular rhythm, normal heart sounds and intact distal pulses. Pulmonary/Chest: Effort normal and breath sounds normal. No respiratory distress. Abdominal: Soft. Bowel sounds are normal. She exhibits no distension. There is no tenderness. Musculoskeletal: She exhibits edema (left leg with nonpitting edema. No swelling in ankles. +pedal pulse) and tenderness (calf). Stiffness in left knee with favoring or right leg on standing. Neurological: She is alert and oriented to person, place, and time. Psychiatric: She has a normal mood and affect. Her behavior is normal.  
 
 
Diagnostics Orders Placed This Encounter  [facility] MAMMOGRAM SCREENING DIGITAL BILAT Standing Status:   Future Standing Expiration Date:   10/25/2019 Order Specific Question:   Reason for Exam  
  Answer:   Breast cancer screening Results Results for orders placed or performed during the hospital encounter of 09/07/18 [facility] SPECIMEN COL Result Value Ref Range [facility] SPE[facility]. Specimens collected/sent to [facility]. Please direct inquiries to ([phone]). Assessment and Plan Diagnoses and all orders for this visit: 1. Encounter to discuss test results 2. Prediabetes 3. BMI 45.0-49.9, adult ([facility] [address] 4. Screening for malignant neoplasm of breast 
-     [facility] MAMMOGRAM SCREENING DIGITAL BILAT; Future 5. Pain of left calf -     DUPLEX LOWER EXT VENOUS LEFT; Future Discussed risk of DVT and concerns for DVT. Patient verbalized understanding. After care summary printed and reviewed with patient. Plan reviewed with patient. Questions answered. Patient verbalized understanding of plan and is in agreement with plan. Patient to follow up in one week or earlier if symptoms worsen. MARIO Simms Discussed the patient's BMI with her. The BMI follow up plan is as follows:  
 
dietary management education, guidance, and counseling - will return to gym and reduce potato chips 
encourage exercise 
monitor weight prescribed dietary intake An After Visit Summary was printed and given to the patient.  
 
MARIO Panchal

## 2018-09-25 NOTE — PATIENT INSTRUCTIONS
Please contact our office if you have any questions about your visit today. Body Mass Index: Care Instructions Your Care Instructions Body mass index (BMI) can help you see if your weight is raising your risk for health problems. It uses a formula to compare how much you weigh with how tall you are. · A BMI lower than 18.5 is considered underweight. · A BMI between 18.5 and 24.9 is considered healthy. · A BMI between 25 and 29.9 is considered overweight. A BMI of 30 or higher is considered obese. If your BMI is in the normal range, it means that you have a lower risk for weight-related health problems. If your BMI is in the overweight or obese range, you may be at increased risk for weight-related health problems, such as high blood pressure, heart disease, stroke, arthritis or joint pain, and diabetes. If your BMI is in the underweight range, you may be at increased risk for health problems such as fatigue, lower protection (immunity) against illness, muscle loss, bone loss, hair loss, and hormone problems. BMI is just one measure of your risk for weight-related health problems. You may be at higher risk for health problems if you are not active, you eat an unhealthy diet, or you drink too much alcohol or use tobacco products. Follow-up care is a key part of your treatment and safety. Be sure to make and go to all appointments, and call your doctor if you are having problems. It's also a good idea to know your test results and keep a list of the medicines you take. How can you care for yourself at home? · Practice healthy eating habits. This includes eating plenty of fruits, vegetables, whole grains, lean protein, and low-fat dairy. · If your doctor recommends it, get more exercise. Walking is a good choice. Bit by bit, increase the amount you walk every day. Try for at least 30 minutes on most days of the week. · Do not smoke. Smoking can increase your risk for health problems.  If you need help quitting, talk to your doctor about stop-smoking programs and medicines. These can increase your chances of quitting for good. · Limit alcohol to 2 drinks a day for men and 1 drink a day for women. Too much alcohol can cause health problems. If you have a BMI higher than 25 · Your doctor may do other tests to check your risk for weight-related health problems. This may include measuring the distance around your waist. A waist measurement of more than 40 inches in men or 35 inches in women can increase the risk of weight-related health problems. · Talk with your doctor about steps you can take to stay healthy or improve your health. You may need to make lifestyle changes to lose weight and stay healthy, such as changing your diet and getting regular exercise. If you have a BMI lower than 18.5 · Your doctor may do other tests to check your risk for health problems. · Talk with your doctor about steps you can take to stay healthy or improve your health. You may need to make lifestyle changes to gain or maintain weight and stay healthy, such as getting more healthy foods in your diet and doing exercises to build muscle. Where can you learn more? Go to http://flo-hari.info/. Enter S176 in the search box to learn more about \"Body Mass Index: Care Instructions. \" Current as of: October 13, 2016 Content Version: 11.4 © 5251-0231 [employer]. Care instructions adapted under license by [employer] (which disclaims liability or warranty for this information). If you have questions about a medical condition or this instruction, always ask your healthcare professional. [place] [address] any warranty or liability for your use of this information. Deep Vein Thrombosis: Care Instructions Your Care Instructions A deep vein thrombosis (DVT) is a blood clot in certain veins of the legs, pelvis, or arms. Blood clots in these veins need to be treated because they can get bigger, break loose, and travel through the bloodstream to the lungs. A blood clot in a lung can be life-threatening. The doctor may have given you a blood thinner (anticoagulant). A blood thinner can stop the blood clot from growing larger and prevent new clots from forming. You will need to take a blood thinner for 3 to 6 months or longer. The doctor has checked you carefully, but problems can develop later. If you notice any problems or new symptoms, get medical treatment right away. Follow-up care is a key part of your treatment and safety. Be sure to make and go to all appointments, and call your doctor if you are having problems. It's also a good idea to know your test results and keep a list of the medicines you take. How can you care for yourself at home? · Take your medicines exactly as prescribed. Call your doctor if you think you are having a problem with your medicine. · If you are taking a blood thinner, be sure you get instructions about how to take your medicine safely. Blood thinners can cause serious bleeding problems. · Wear compression stockings if your doctor recommends them. These stockings are tighter at the feet than on the legs. They may reduce pain and swelling in your legs. But there are different types of stockings, and they need to fit right. So your doctor will recommend what you need. · When you sit, use a pillow to raise the arm or leg that has the blood clot. Try to keep it above the level of your heart. When should you call for help? Call 911 anytime you think you may need emergency care. For example, call if: 
  · You passed out (lost consciousness).  
  · You have symptoms of a blood clot in your lung (called a pulmonary embolism). These include: 
¨ Sudden chest pain. ¨ Trouble breathing. ¨ Coughing up blood.  
 Call your doctor now or seek immediate medical care if:   · You have new or worse trouble breathing.  
  · You are dizzy or lightheaded, or you feel like you may faint.  
  · You have symptoms of a blood clot in your arm or leg. These may include: 
¨ Pain in the arm, calf, back of the knee, thigh, or groin. ¨ Redness and swelling in the arm, leg, or groin.  
 Watch closely for changes in your health, and be sure to contact your doctor if: 
  · You do not get better as expected. Where can you learn more? Go to http://flo-hari.info/. Enter M155 in the search box to learn more about \"Deep Vein Thrombosis: Care Instructions. \" Current as of: November 21, 2017 Content Version: 11.7 © 9137-8575 [employer]. Care instructions adapted under license by [employer] (which disclaims liability or warranty for this information). If you have questions about a medical condition or this instruction, always ask your healthcare professional. [place] [address] any warranty or liability for your use of this information.

## 2018-09-25 NOTE — MR AVS SNAPSHOT
[address] [facility] 
 
 
 [address] [place] [id number] [phone] Patient: Kimberly Mosher MRN:  [id number] Visit Information Date & Time Provider Department Dept. Phone Encounter #  
 2018  3:15 PM Oneal Lennox, NP [address] [phone] [id number] Follow-up Instructions Return if symptoms worsen or fail to improve. Your Appointments 2018  4:00 PM  
Follow Up with Nick Avalos MD  
[facility] at [address] [facility]) Appt Note: 3 month fu  
 [facility] [address] [place] [address] [facility] [address] [facility] [address] [facility] Upcoming Health Maintenance Date Due Shingrix Vaccine Age 50> (1 of 2) 2015 FOBT Q 1 YEAR AGE 50-75 2015 BREAST CANCER SCRN MAMMOGRAM 2018 PAP AKA CERVICAL CYTOLOGY 3/1/2020 DTaP/Tdap/Td series (2 - Td) 3/1/2027 Allergies as of 2018  Review Complete On: 2018 By: Oneal Lennox, NP Severity Noted Reaction Type Reactions Hydrochlorothiazide   Side Effect Other (comments) TINGLING IN BACK OF HEAD Norvasc [Amlodipine]  2010    Other (comments) Tingling in back of head Current Immunizations  Reviewed on 11/3/2016 Name Date Influenza Vaccine 11/3/2015 Influenza Vaccine (Quad) PF 2018, 10/24/2017, 2016 Influenza Vaccine PF 2013 Influenza Vaccine Split 10/26/2012, 2011 Tdap 3/1/2017 Not reviewed this visit You Were Diagnosed With   
  
 Codes Comments Encounter to discuss test results    -  Primary ICD-10-CM: Z71.89 ICD-9-CM: V65.49 Prediabetes     ICD-10-CM: R73.03 
ICD-9-CM: 790.29 BMI 45.0-49.9, adult [facility])     ICD-10-CM: B51.08 
ICD-9-CM: V85.42 Screening for malignant neoplasm of breast     ICD-10-CM: Z12.31 
ICD-9-CM: V76.10 Pain of left calf     ICD-10-CM: U50.975 ICD-9-CM: 729.5 Vitals BP Pulse Temp Resp Height(growth percentile) Weight(growth percentile) 114/74 (BP 1 Location: Left arm, BP Patient Position: Sitting) 66 98.9 °F (37.2 °C) (Oral) 16 5' 2\" (1.575 m) 251 lb 8 oz (114.1 kg) BMI OB Status Smoking Status 46 kg/m2 Hysterectomy Never Smoker BMI and BSA Data Body Mass Index Body Surface Area  
 46 kg/m 2 2.23 m 2 Preferred Pharmacy Pharmacy Name Phone [id number] [place], [address] [facility] [facility] [phone] Your Updated Medication List  
  
   
This list is accurate as of 9/25/18  4:12 PM.  Always use your most recent med list.  
  
  
  
  
 citalopram 10 mg tablet Commonly known as:  CELEXA  
TAKE 1/2 TABLET BY MOUTH ONCE DAILY CITRACAL + D PO Take  by mouth daily. losartan 100 mg tablet Commonly known as:  COZAAR  
TAKE 1 TAB BY MOUTH DAILY. MULTIVITAMIN PO Take 1 Tab by mouth daily. pravastatin 40 mg tablet Commonly known as:  PRAVACHOL  
TAKE 1 TABLET BY MOUTH NIGHTLY. VITAMIN D3 2,000 unit Cap capsule Generic drug:  Cholecalciferol (Vitamin D3) 1 cap daily Follow-up Instructions Return if symptoms worsen or fail to improve. To-Do List   
 09/25/2018 Vascular/US:  DUPLEX LOWER EXT VENOUS LEFT   
  
 09/25/2018 Imaging:  DAVID MAMMO BI SCREENING INCL CAD Patient Instructions Please contact our office if you have any questions about your visit today. Body Mass Index: Care Instructions Your Care Instructions Body mass index (BMI) can help you see if your weight is raising your risk for health problems. It uses a formula to compare how much you weigh with how tall you are. · A BMI lower than 18.5 is considered underweight. · A BMI between 18.5 and 24.9 is considered healthy. · A BMI between 25 and 29.9 is considered overweight.  A BMI of 30 or higher is considered obese. If your BMI is in the normal range, it means that you have a lower risk for weight-related health problems. If your BMI is in the overweight or obese range, you may be at increased risk for weight-related health problems, such as high blood pressure, heart disease, stroke, arthritis or joint pain, and diabetes. If your BMI is in the underweight range, you may be at increased risk for health problems such as fatigue, lower protection (immunity) against illness, muscle loss, bone loss, hair loss, and hormone problems. BMI is just one measure of your risk for weight-related health problems. You may be at higher risk for health problems if you are not active, you eat an unhealthy diet, or you drink too much alcohol or use tobacco products. Follow-up care is a key part of your treatment and safety. Be sure to make and go to all appointments, and call your doctor if you are having problems. It's also a good idea to know your test results and keep a list of the medicines you take. How can you care for yourself at home? · Practice healthy eating habits. This includes eating plenty of fruits, vegetables, whole grains, lean protein, and low-fat dairy. · If your doctor recommends it, get more exercise. Walking is a good choice. Bit by bit, increase the amount you walk every day. Try for at least 30 minutes on most days of the week. · Do not smoke. Smoking can increase your risk for health problems. If you need help quitting, talk to your doctor about stop-smoking programs and medicines. These can increase your chances of quitting for good. · Limit alcohol to 2 drinks a day for men and 1 drink a day for women. Too much alcohol can cause health problems. If you have a BMI higher than 25 · Your doctor may do other tests to check your risk for weight-related health problems.  This may include measuring the distance around your waist. A waist measurement of more than 40 inches in men or 35 inches in women can increase the risk of weight-related health problems. · Talk with your doctor about steps you can take to stay healthy or improve your health. You may need to make lifestyle changes to lose weight and stay healthy, such as changing your diet and getting regular exercise. If you have a BMI lower than 18.5 · Your doctor may do other tests to check your risk for health problems. · Talk with your doctor about steps you can take to stay healthy or improve your health. You may need to make lifestyle changes to gain or maintain weight and stay healthy, such as getting more healthy foods in your diet and doing exercises to build muscle. Where can you learn more? Go to http://flo-hari.info/. Enter S176 in the search box to learn more about \"Body Mass Index: Care Instructions. \" Current as of: October 13, 2016 Content Version: 11.4 © 4887-7744 [employer]. Care instructions adapted under license by [employer] (which disclaims liability or warranty for this information). If you have questions about a medical condition or this instruction, always ask your healthcare professional. [place] [address] any warranty or liability for your use of this information. Deep Vein Thrombosis: Care Instructions Your Care Instructions A deep vein thrombosis (DVT) is a blood clot in certain veins of the legs, pelvis, or arms. Blood clots in these veins need to be treated because they can get bigger, break loose, and travel through the bloodstream to the lungs. A blood clot in a lung can be life-threatening. The doctor may have given you a blood thinner (anticoagulant). A blood thinner can stop the blood clot from growing larger and prevent new clots from forming. You will need to take a blood thinner for 3 to 6 months or longer. The doctor has checked you carefully, but problems can develop later. If you notice any problems or new symptoms, get medical treatment right away. Follow-up care is a key part of your treatment and safety. Be sure to make and go to all appointments, and call your doctor if you are having problems. It's also a good idea to know your test results and keep a list of the medicines you take. How can you care for yourself at home? · Take your medicines exactly as prescribed. Call your doctor if you think you are having a problem with your medicine. · If you are taking a blood thinner, be sure you get instructions about how to take your medicine safely. Blood thinners can cause serious bleeding problems. · Wear compression stockings if your doctor recommends them. These stockings are tighter at the feet than on the legs. They may reduce pain and swelling in your legs. But there are different types of stockings, and they need to fit right. So your doctor will recommend what you need. · When you sit, use a pillow to raise the arm or leg that has the blood clot. Try to keep it above the level of your heart. When should you call for help? Call 911 anytime you think you may need emergency care. For example, call if: 
  · You passed out (lost consciousness).  
  · You have symptoms of a blood clot in your lung (called a pulmonary embolism). These include: 
¨ Sudden chest pain. ¨ Trouble breathing. ¨ Coughing up blood.  
 Call your doctor now or seek immediate medical care if: 
  · You have new or worse trouble breathing.  
  · You are dizzy or lightheaded, or you feel like you may faint.  
  · You have symptoms of a blood clot in your arm or leg. These may include: 
¨ Pain in the arm, calf, back of the knee, thigh, or groin. ¨ Redness and swelling in the arm, leg, or groin.  
 Watch closely for changes in your health, and be sure to contact your doctor if: 
  · You do not get better as expected. Where can you learn more? Go to http://flo-hari.info/. Enter T059 in the search box to learn more about \"Deep Vein Thrombosis: Care Instructions. \" Current as of: November 21, 2017 Content Version: 11.7 © 9814-3800 [employer]. Care instructions adapted under license by [employer] (which disclaims liability or warranty for this information). If you have questions about a medical condition or this instruction, always ask your healthcare professional. [address] any warranty or liability for your use of this information. Introducing [facility]! Kelly Hoffmann introduces SurgeryEdu patient portal. Now you can access parts of your medical record, email your doctor's office, and request medication refills online. 1. In your internet browser, go to [email or web address] 2. Click on the First Time User? Click Here link in the Sign In box. You will see the New Member Sign Up page. 3. Enter your SurgeryEdu Access Code exactly as it appears below. You will not need to use this code after youve completed the sign-up process. If you do not sign up before the expiration date, you must request a new code. · SurgeryEdu Access Code: [email or web address] Expires: 11/20/2018  3:39 PM 
 
4. Enter the last four digits of your Social Security Number (xxxx) and Date of Birth (mm/dd/yyyy) as indicated and click Submit. You will be taken to the next sign-up page. 5. Create a SurgeryEdu ID. This will be your SurgeryEdu login ID and cannot be changed, so think of one that is secure and easy to remember. 6. Create a SurgeryEdu password. You can change your password at any time. 7. Enter your Password Reset Question and Answer. This can be used at a later time if you forget your password. 8. Enter your e-mail address. You will receive e-mail notification when new information is available in [address] 9. Click Sign Up. You can now view and download portions of your medical record. 10. Click the Download Summary menu link to download a portable copy of your medical information. If you have questions, please visit the Frequently Asked Questions section of the TweepsMap website. Remember, TweepsMap is NOT to be used for urgent needs. For medical emergencies, dial 911. Now available from your iPhone and Android! Please provide this summary of care documentation to your next provider. Your primary care clinician is listed as Alexa Pak. If you have any questions after today's visit, please call [phone].

## 2018-09-26 ENCOUNTER — HOSPITAL ENCOUNTER (OUTPATIENT)
Dept: VASCULAR SURGERY | Age: 53
Discharge: HOME | End: 2018-09-26
Attending: NURSE PRACTITIONER
Payer: COMMERCIAL

## 2018-09-26 DIAGNOSIS — M79.662 PAIN OF LEFT CALF: ICD-10-CM

## 2018-09-26 PROCEDURE — 93971 EXTREMITY STUDY: CPT

## 2018-11-01 DIAGNOSIS — Z12.31 SCREENING MAMMOGRAM, ENCOUNTER FOR: ICD-10-CM

## 2018-11-21 ENCOUNTER — OFFICE VISIT (OUTPATIENT)
Dept: NEUROLOGY | Age: 53
End: 2018-11-21

## 2018-11-21 VITALS
OXYGEN SATURATION: 99 % | DIASTOLIC BLOOD PRESSURE: 66 MMHG | HEART RATE: 66 BPM | HEIGHT: 62 IN | RESPIRATION RATE: 18 BRPM | BODY MASS INDEX: 45.82 KG/M2 | WEIGHT: 249 LBS | SYSTOLIC BLOOD PRESSURE: 118 MMHG | TEMPERATURE: 97.8 F

## 2018-11-21 DIAGNOSIS — G47.33 OSA (OBSTRUCTIVE SLEEP APNEA): Primary | ICD-10-CM

## 2018-11-21 NOTE — PROGRESS NOTES
11/21/2018 3:53 PM 
 
SSN: [id number] Subjective:   51-year-old female returning with a chief complaint of follow-up of obstructive sleep apnea. The last time I saw her was in August.  She was complaining of mask discomfort. We had adjusted her pressure and she was somewhat more comfortable, I requested a Dreamwear FF mask, and she likes it, but she received. She has been compliant up to the last week, as she has had a cold, has used it only one day last week, and snored heavily per . No download is available. Social History Socioeconomic History  Marital status:  Spouse name: Not on file  Number of children: Not on file  Years of education: Not on file  Highest education level: Not on file Social Needs  Financial resource strain: Not on file  Food insecurity - worry: Not on file  Food insecurity - inability: Not on file  Transportation needs - medical: Not on file  Transportation needs - non-medical: Not on file Occupational History  Not on file Tobacco Use  Smoking status: Never Smoker  Smokeless tobacco: Never Used Substance and Sexual Activity  Alcohol use: No  
  Alcohol/week: 0.0 oz  Drug use: No  
 Sexual activity: Not on file Other Topics Concern  Not on file Social History Narrative  Not on file Family History Problem Relation Age of Onset  Hypertension Mother  Glaucoma Mother  Hypertension Father  Stroke Brother  Heart Disease Maternal Aunt   
     chf  
 Heart Failure Maternal Aunt MI  
 Diabetes Maternal Uncle  Heart Failure Other MI  
 Stroke Other  Colon Cancer Other Current Outpatient Medications Medication Sig Dispense Refill  citalopram (CELEXA) 10 mg tablet TAKE 1/2 TABLET BY MOUTH ONCE DAILY 45 Tab 3  
 losartan (COZAAR) 100 mg tablet TAKE 1 TAB BY MOUTH DAILY.  90 Tab 1  
  pravastatin (PRAVACHOL) 40 mg tablet TAKE 1 TABLET BY MOUTH NIGHTLY. 90 Tab 3  Cholecalciferol, Vitamin D3, (VITAMIN D3) 2,000 unit cap 1 cap daily 30 Cap prn  CALCIUM CITRATE/VITAMIN D3 (CITRACAL + D PO) Take  by mouth daily.  MULTIVITAMINS (MULTIVITAMIN PO) Take 1 Tab by mouth daily. Past Medical History:  
Diagnosis Date  Allergy  Anxiety 4/24/2010  Essential hypertension  HTN (hypertension) 4/24/2010  Hyperlipemia 4/24/2010  Hypokalemia 4/24/2010  Scoliosis Past Surgical History:  
Procedure Laterality Date [address]  HX HYSTERECTOMY  05/07/08  
 partial  
 
 
Allergies Allergen Reactions  Hydrochlorothiazide Other (comments) TINGLING IN BACK OF HEAD  Norvasc [Amlodipine] Other (comments) Tingling in back of head Vital signs:   
Visit Vitals /66 (BP 1 Location: Right arm, BP Patient Position: Sitting) Pulse 66 Temp 97.8 °F (36.6 °C) Resp 18 Ht 5' 2\" (1.575 m) Wt 112.9 kg (249 lb) SpO2 99% BMI 45.54 kg/m² Review of Systems:  
GENERAL: Denies fever or fatigue CARDIAC: No CP or SOB PULMONARY: No cough of SOB MUSCULOSKELETAL: No new joint pain NEURO: SEE HPI 
 
 
EXAM: Alert, in NAD. Heart is regular. Oriented x3, EOM's are full, PERRL, no facial asymmetries. Strength and tone are normal. DTR's +2, gait symmetric Assessment/Plan: SEFERINO, subjectively improving compliance, improving comfort with mask and pressure adjustments. Will procure download for corroboration and adjustments if needed. Will ask [employer] company to rectify the interface issues. I will see her in 3 months or prn. PLEASE NOTE:  
Portions of this document may have been produced using voice recognition software. Unrecognized errors in transcription may be present. This note will not be viewable in [address]

## 2018-12-05 ENCOUNTER — OFFICE VISIT (OUTPATIENT)
Dept: INTERNAL MEDICINE CLINIC | Age: 53
End: 2018-12-05

## 2018-12-05 ENCOUNTER — HOSPITAL ENCOUNTER (OUTPATIENT)
Dept: GENERAL RADIOLOGY | Age: 53
Discharge: HOME | End: 2018-12-05
Payer: COMMERCIAL

## 2018-12-05 VITALS
BODY MASS INDEX: 45.27 KG/M2 | DIASTOLIC BLOOD PRESSURE: 90 MMHG | HEART RATE: 62 BPM | TEMPERATURE: 98 F | WEIGHT: 246 LBS | RESPIRATION RATE: 16 BRPM | SYSTOLIC BLOOD PRESSURE: 120 MMHG | HEIGHT: 62 IN

## 2018-12-05 DIAGNOSIS — M54.41 ACUTE MIDLINE LOW BACK PAIN WITH RIGHT-SIDED SCIATICA: Primary | ICD-10-CM

## 2018-12-05 DIAGNOSIS — M54.41 ACUTE MIDLINE LOW BACK PAIN WITH RIGHT-SIDED SCIATICA: ICD-10-CM

## 2018-12-05 PROCEDURE — 72100 X-RAY EXAM L-S SPINE 2/3 VWS: CPT

## 2018-12-05 RX ORDER — PREDNISONE 10 MG/1
TABLET ORAL
Qty: 21 TAB | Refills: 0 | Status: SHIPPED | OUTPATIENT
Start: 2018-12-05 | End: 2019-10-14

## 2018-12-05 NOTE — PROGRESS NOTES
ROOM # 12    Effie Cuadra presents today for   Chief Complaint   Patient presents with    Back Pain       Effie Cuadra preferred language for health care discussion is english/other. Is someone accompanying this pt? No    Is the patient using any DME equipment during OV? No    Depression Screening:  PHQ over the last two weeks 12/5/2018 11/21/2018 9/11/2018 10/25/2017 7/7/2017 7/11/2016 6/5/2015   PHQ Not Done - - - - Active Diagnosis of Depression or Bipolar Disorder Active Diagnosis of Depression or Bipolar Disorder -   Little interest or pleasure in doing things Not at all Not at all Not at all Not at all - - Not at all   Feeling down, depressed, irritable, or hopeless Not at all Not at all Not at all Not at all - - Not at all   Total Score PHQ 2 0 0 0 0 - - 0       Learning Assessment:  Learning Assessment 9/11/2018 11/3/2015 11/21/2013 11/30/2012   PRIMARY LEARNER Patient Patient Patient Patient   HIGHEST LEVEL OF EDUCATION - PRIMARY LEARNER  GRADUATED HIGH SCHOOL OR GED GRADUATED HIGH SCHOOL OR GED - -   BARRIERS PRIMARY LEARNER NONE NONE - -   CO-LEARNER CAREGIVER No No - -   PRIMARY LANGUAGE ENGLISH ENGLISH ENGLISH ENGLISH    NEED No - - -   LEARNER PREFERENCE PRIMARY DEMONSTRATION DEMONSTRATION - LISTENING   ANSWERED BY patient patient patient patient   RELATIONSHIP SELF SELF SELF SELF       Abuse Screening:  Abuse Screening Questionnaire 9/11/2018 2/21/2014   Do you ever feel afraid of your partner? N -   Are you in a relationship with someone who physically or mentally threatens you? N N   Is it safe for you to go home? Y Y       Fall Risk  No flowsheet data found. Visit Vitals  /90 (BP 1 Location: Left arm, BP Patient Position: Sitting)   Pulse 62   Temp 98 °F (36.7 °C) (Oral)   Resp 16   Ht 5' 2\" (1.575 m)   Wt 246 lb (111.6 kg)   BMI 44.99 kg/m²       Health Maintenance reviewed and discussed per provider.  Yes    Effie Cuadra is due for   Health Maintenance Due Topic Date Due    Shingrix Vaccine Age 50> (1 of 2) 01/22/2015    FOBT Q 1 YEAR AGE 50-75  01/22/2015     Please order/place referral if appropriate. Advance Directive:  1. Do you have an advance directive in place? Patient Reply: No    2. If not, would you like material regarding how to put one in place? Patient Reply: No    Coordination of Care:  1. Have you been to the ER, urgent care clinic since your last visit? Hospitalized since your last visit? No    2. Have you seen or consulted any other health care providers outside of the [address] [place] [address] since your last visit? Include any pap smears or colon screening.  NO

## 2018-12-05 NOTE — PROGRESS NOTES
Cornelius Ryan is a 48 y.o.  female and presents with    Chief Complaint   Patient presents with    Back Pain       Subjective:  HPI  Mrs. Karen Flores presents today with complaints of low back pain x 1 week. She went to work Monday got up to go home and got a sharp pain to mid lower back with radiation around bilateral the hip regions into the front of the legs but today pain is only with radiation to the right leg. Feels like pressure if sit up straight. She reports went to work yesterday and with difficulty walking. Movement exacerbates it. She took Advil while at work without relief. Last night took Ibuprofen, was able to sleep but when got up this morning took time to get going, states had to crawl across the floor to get to the phone. No injury or falls. Additional Concerns: none     ROS   Review of Systems   Constitutional: Negative. HENT: Negative. Eyes: Negative. Respiratory: Negative. Cardiovascular: Negative. Gastrointestinal: Negative. Genitourinary: Negative. Musculoskeletal: Positive for back pain. Negative for falls. Skin: Negative. Neurological: Positive for tingling. Negative for dizziness and headaches. Allergies   Allergen Reactions    Hydrochlorothiazide Other (comments)     TINGLING IN BACK OF HEAD    Norvasc [Amlodipine] Other (comments)     Tingling in back of head       Current Outpatient Medications   Medication Sig Dispense Refill    predniSONE (STERAPRED DS) 10 mg dose pack See administration instruction per 10mg dose pack 21 Tab 0    citalopram (CELEXA) 10 mg tablet TAKE 1/2 TABLET BY MOUTH ONCE DAILY 45 Tab 3    losartan (COZAAR) 100 mg tablet TAKE 1 TAB BY MOUTH DAILY. 90 Tab 1    pravastatin (PRAVACHOL) 40 mg tablet TAKE 1 TABLET BY MOUTH NIGHTLY. 90 Tab 3    Cholecalciferol, Vitamin D3, (VITAMIN D3) 2,000 unit cap 1 cap daily   30 Cap prn    CALCIUM CITRATE/VITAMIN D3 (CITRACAL + D PO) Take  by mouth daily.       MULTIVITAMINS (MULTIVITAMIN PO) Take 1 Tab by mouth daily. Social History     Socioeconomic History    Marital status:      Spouse name: Not on file    Number of children: Not on file    Years of education: Not on file    Highest education level: Not on file   Social Needs    Financial resource strain: Not on file    Food insecurity - worry: Not on file    Food insecurity - inability: Not on file    Transportation needs - medical: Not on file   [place] Medical needs - non-medical: Not on file   Occupational History    Not on file   Tobacco Use    Smoking status: Never Smoker    Smokeless tobacco: Never Used   Substance and Sexual Activity    Alcohol use: No     Alcohol/week: 0.0 oz    Drug use: No    Sexual activity: Not on file   Other Topics Concern    Not on file   Social History Narrative    Not on file       Past Medical History:   Diagnosis Date    Allergy     Anxiety 4/24/2010    Essential hypertension     HTN (hypertension) 4/24/2010    Hyperlipemia 4/24/2010    Hypokalemia 4/24/2010    Scoliosis        Past Surgical History:   Procedure Laterality Date    HX APPENDECTOMY  1979    HX HYSTERECTOMY  05/07/08    partial       Family History   Problem Relation Age of Onset    Hypertension Mother     Glaucoma Mother     Hypertension Father     Stroke Brother     Heart Disease Maternal Aunt         chf    Heart Failure Maternal Aunt         MI    Diabetes Maternal Uncle     Heart Failure Other         MI    Stroke Other     Colon Cancer Other        Objective:  Vitals:    12/05/18 1209   BP: 120/90   Pulse: 62   Resp: 16   Temp: 98 °F (36.7 °C)   TempSrc: Oral   Weight: 246 lb (111.6 kg)   Height: 5' 2\" (1.575 m)   PainSc:   8   PainLoc: Back       LABS   Results for orders placed or performed during the hospital encounter of 09/07/18   [facility] SPECIMEN COL   Result Value Ref Range    [facility] SPECIMEN COLLN.         Specimens collected/sent to [facility]. Please direct inquiries to ([phone]). TESTS  none    PE  Physical Exam   Constitutional: She is oriented to person, place, and time. She appears well-developed and well-nourished. No distress. HENT:   Head: Normocephalic and atraumatic. Cardiovascular: Normal rate, regular rhythm, normal heart sounds and intact distal pulses. Pulmonary/Chest: Effort normal and breath sounds normal. No respiratory distress. She has no wheezes. She has no rales. She exhibits no tenderness. Abdominal: Soft. Bowel sounds are normal.   Musculoskeletal: Normal range of motion. She exhibits tenderness. She exhibits no edema or deformity. Neurological: She is alert and oriented to person, place, and time. Skin: Skin is warm and dry. She is not diaphoretic. Psychiatric: She has a normal mood and affect. Her behavior is normal. Judgment and thought content normal.   Vitals reviewed. Assessment/Plan:    1. Acute lumbar pain with right sided sciatica- Xray lumbar, referred to PT, Prednisone dose pack. No NSAIDS. Follow up if worsening or new symptoms with PCP. Lab review: no lab studies available for review at time of visit    Today's Visit:   Diagnoses and all orders for this visit:    1. Acute midline low back pain with right-sided sciatica  -     XR SPINE LUMB 2 OR 3 V; Future  -     predniSONE (STERAPRED DS) 10 mg dose pack; See administration instruction per 10mg dose pack  -     REFERRAL TO PHYSICAL THERAPY      Health Maintenance: Deferred to PCP . I have discussed the diagnosis with the patient and the intended plan as seen in the above orders. The patient has received an after-visit summary and questions were answered concerning future plans. I have discussed medication side effects and warnings with the patient as well. I have reviewed the plan of care with the patient, accepted their input and they are in agreement with the treatment goals.        Follow-up Disposition: Not on File   More than 1/2 of this 15 minute visit was spent in counseling and coordination of care, as described above.     MARIO Mayorga  Internist of [address], [address] [place] [address].  Phone: [phone]  Fax: [phone]

## 2018-12-06 ENCOUNTER — TELEPHONE (OUTPATIENT)
Dept: INTERNAL MEDICINE CLINIC | Age: 53
End: 2018-12-06

## 2018-12-06 NOTE — TELEPHONE ENCOUNTER
----- Message from Antoinette Atkins NP sent at 12/5/2018  4:48 PM EST -----  No acute process but with reported degenerative changes. Keep course of treatment for now.

## 2018-12-06 NOTE — TELEPHONE ENCOUNTER
Chief Complaint   Patient presents with    Results     done 12-05-18 Xray Spine Lumbar 2 or 3 V per NP Karrie     Patient reached and informed of results, she understands all.

## 2018-12-12 RX ORDER — LOSARTAN POTASSIUM 100 MG/1
TABLET ORAL
Qty: 90 TAB | Refills: 1 | OUTPATIENT
Start: 2018-12-12

## 2018-12-13 ENCOUNTER — HOSPITAL ENCOUNTER (OUTPATIENT)
Dept: PHYSICAL THERAPY | Age: 53
Discharge: HOME | End: 2018-12-13
Payer: COMMERCIAL

## 2018-12-13 PROCEDURE — 97110 THERAPEUTIC EXERCISES: CPT

## 2018-12-13 PROCEDURE — 97535 SELF CARE MNGMENT TRAINING: CPT

## 2018-12-13 PROCEDURE — 97161 PT EVAL LOW COMPLEX 20 MIN: CPT

## 2018-12-13 RX ORDER — LOSARTAN POTASSIUM 100 MG/1
TABLET ORAL
Qty: 90 TAB | Refills: 1 | Status: SHIPPED | OUTPATIENT
Start: 2018-12-13 | End: 2019-06-21 | Stop reason: SDUPTHER

## 2018-12-13 NOTE — PROGRESS NOTES
PT DAILY TREATMENT NOTE/LUMBAR EVAL 10-18    Patient Name: Kvng Cooper  Date:2018  : 1965  [x]  Patient  Verified  Payor: Cat Deshpande / Plan: Reyearash / Product Type: Commerical /    In time:4:38pm  Out time:5:31pm  Total Treatment Time (min): 48  Visit #: 1 of 5    Medicare/BCBS Only   Total Timed Codes (min):  25 1:1 Treatment Time:  53     Treatment Area: Lumbago with sciatica, right side [M54.41]  SUBJECTIVE  Pain Level (0-10 scale): 1-2/10  []constant []intermittent [x]improving []worsening []no change since onset    Any medication changes, allergies to medications, adverse drug reactions, diagnosis change, or new procedure performed?: [x] No    [] Yes (see summary sheet for update)  Subjective functional status/changes:     Pt is a 48year old female presenting with reports of onset of back pain 1.5 weeks ago after standing up from a chair at work. She reports pain initially subsided and ws sore that evening, but reports the next day having a hard time getting out of bed and the day after that having to crawl out of her house to get to the doctor because her back was in so much pain. She reports initially pain was sharp in her back and referred sharp pain into B ant thighs towards her knees. Since then, her pain has subsided and she now presents with dull aching pain and stiffness more in the right side of her lower back.       PLOF: sedentary void of severe LBP or functional deficits  Limitations to PLOF: limited ease of movement 2/2 stiffness, fear avoidance of activity 2/2 prior pain  Mechanism of Injury: see above  Current symptoms/Complaints: right sided aching LBP and stiffness, fear of movement  Previous Treatment/Compliance: PCP  PMHx/Surgical Hx: appendectomy, tonsillectomy, hysterectomy, obesity, prior intermittent LBP  Work Hx: see intake  Living Situation:   Pt Goals: see intake  Barriers: []pain []financial []time []transportation []other  Motivation: appears motivated and cooperative  Substance use: []Alcohol []Tobacco []other:   FABQ Score: []low []elevate  Cognition: A & O x 4    Other:    OBJECTIVE/EXAMINATION  Domestic Life:   Activity/Recreational Limitations:   Mobility:   Self Care:       28 min [x]Eval                  []Re-Eval     15 min Therapeutic Exercise:  [] See flow sheet : HEP creation and instruction per handout   Rationale: increase ROM to improve the patients ability to improve ease of daily activity. Self care: 10 minutes pt education regarding relevant anatomy, diagnosis, prognosis, plan of care, modality use, gradual progression of activity and activity modification to facilitate pt self management. With   [] TE   [] TA   [] neuro   [] other: Patient Education: [x] Review HEP    [] Progressed/Changed HEP based on:   [] positioning   [] body mechanics   [] transfers   [] heat/ice application    [] other:      Other Objective/Functional Measures:     Physical Therapy Evaluation - Lumbar Spine ([facility])    SUBJECTIVE  Chief Complaint:    Mechanism of injury:    Symptoms:  Aggravated by:   [] Bending [] Sitting [] Standing [] Walking   [] Moving [] Cough [] Sneeze [] Valsalva   [] AM  [] PM  Lying:  [] sup   [] pro   [] sidelying   [x] Other: unsure     Eased by:    [] Bending [] Sitting [] Standing [] Walking   [x] Moving [] AM  [] PM  Lying: [x] sup with knees bent  [] pro  [] sidelying    [] Other:     General Health:  Red Flags Indicated? [] Yes    [x] No  [] Yes [] No Recent weight change (If yes, due to dieting?  [] Yes  [] No)   [] Yes [] No Weakness in legs during walking  [] Yes [] No Unremitting pain at night  [] Yes [] No Abdominal pain or problems  [] Yes [] No Rectal bleeding  [] Yes [] No Feet more cold or painful in cold weather  [] Yes [] No Menstrual irregularities  [] Yes [] No Blood or pain with urination  [] Yes [] No Dysfunction of bowel or bladder  [] Yes [] No Recent illness within past 3 weeks (i.e, cold, flu)  [] Yes [] No Numbness/tingling in buttock/genitalia region    Past History/Treatments:     Diagnostic Tests: [] Lab work [x] X-rays    [] CT [] MRI     [] Other:  Results: L/S X-ray 12/5/2018  \"FINDINGS: 3 views of the lumbar spine obtained. Vertebral body heights are  preserved. Disc space heights are preserved. Multilevel mild chronic appearing  endplate changes. Grade 1 anterolisthesis of L4 on L5. Lower lumbar facet  arthropathy. No acute fracture identified radiographically. Bilateral sacroiliac  degenerative changes.     IMPRESSION  IMPRESSION:     No acute radiographic findings. Degenerative changes as above. \"    Functional Status  Prior level of function:  Present functional limitations:  What position do you sleep in?:    OBJECTIVE  Posture:  Lateral Shift: [] R    [] L     [] +  [] -  Kyphosis: [] Increased [] Decreased   []  WNL  Lordosis:  [x] Increased [] Decreased   [] WNL  Pelvic symmetry: [] WNL    [] Other:    Gait:  [x] Normal     [] Abnormal:    Active Movements: [] N/A   [] Too acute   [] Other:  ROM % AROM % PROM Comments:pain, area   Forward flexion 40-60 Fingers to toes  Right LB discomfort with return to standing movement   Extension 20-30 75%  Minimal right sided LB discomfort   SB right 20-30 50%  Right LB discomfort and aching pain   SB left 20-30 75%  Tension in right lower back   Rotation right 5-10 50%  Stiffness right lower back   Rotation left 5-10 50%  Stiffness      Repeated Movements   Effects on present pain: produces (CT), abolishes (A), increases (incr), decreases (decr), centralizes (C), peripheral (PH), no effect (NE)   Pre-Test Sx Flexion Repeated Flexion Extension Repeated Extension Repeated SBL Repeated SBR   Sitting          Standing  discomfort NE discomfort NE     Lying      N/A N/A   Comments:  Side Glide:  Sustained passive positioning test:    Neuro Screen [x] WNL  Myotome/Dermatome/Reflexes:  Comments:    Dural Mobility:  SLR Sitting: [] R [] L    [] +    [] -  @ (degrees):           Supine: [] R    [] L    [] +    [x] -  @ (degrees):   Slump Test: [] R    [] L    [] +    [x] -  @ (degrees):   Prone Knee Bend: [] R    [] L    [] +    [x] -     Palpation  [x] Min  [] Mod  [] Severe    Location: PSIS & right lumbar paraspinals  [] Min  [] Mod  [] Severe    Location:  [] Min  [] Mod  [] Severe    Location:    Strength   L(0-5) R (0-5) N/T   Hip Flexion (L1,2) 5 5 []   Knee Extension (L3,4) 5 5 []   Ankle Dorsiflexion (L4) 4 4 []   Great Toe Extension (L5) 4 4 []   Ankle Plantarflexion (S1) 4 4 []   Knee Flexion (S1,2) 4 4 []   Upper Abdominals   []   Lower Abdominals   []   Paraspinals   []   Back Rotators   []   Gluteus Ramesh 3+ 3 right LBP []   Other Glute med 3 3 []     Special Tests  Lumbar:  Lumb. Compression: [] Pos  [x] Neg               Lumbar Distraction:   [] Pos  [x] Neg    Quadrant:  [] Pos  [x] Neg   [] Flex  [] Ext    Sacroilliac:  Gaenslen's: [] R    [] L    [] +    [] -     Compression: [] +    [x] -     Gapping:  [] +    [x] -     Thigh Thrust: [] R    [] L    [] +    [] -     Leg Length: [] +    [] -   Position:    Crests:    ASIS:    PSIS:    Sacral Sulcus:    Mobility: Standing flex:     Sitting flex:     Supine to sit:     Prone knee bend:         Hip: Pauline Howard:  [x] R    [] L    [x] +    [] - limited mobility and mild right LBP provocation    Scour:  [] R    [] L    [] +    [x] -     Piriformis: [] R    [] L    [] +    [x] -          Deficits: Pantera's: [] R    [] L    [] +    [x] -     Brendalyn Leas: [] R    [] L    [] +    [x] -     Hamstrings 90/90: WNL bilat    Gastrocsoleus (to neutral): Right: Left:       Global Muscular Weakness:  Abdominals:  Quadratus Lumborum:  Paraspinals: Other:    Other tests/comments:  Fear avoidant of movement and activity     Pain Level (0-10 scale) post treatment: 1-2    ASSESSMENT/Changes in Function: Per POC.     Patient will continue to benefit from skilled PT services to modify and progress therapeutic interventions, address functional mobility deficits, address ROM deficits, address strength deficits, analyze and address soft tissue restrictions, analyze and cue movement patterns, analyze and modify body mechanics/ergonomics and instruct in home and community integration to attain remaining goals. [x]  See Plan of Care  []  See progress note/recertification  []  See Discharge Summary         Progress towards goals / Updated goals:  Per POC. PLAN  []  Upgrade activities as tolerated     [x]  Continue plan of care  []  Update interventions per flow sheet       []  Discharge due to:_  [x]  Other:_Aquatics, then progress to land. 1x/week 2/2 high copay. Emphasize HEP and gradual progression of activity as trunk ROM improves.       Margy Carney, PT, DPT, ATC 12/13/2018  4:46 PM

## 2018-12-14 NOTE — PROGRESS NOTES
[facility]  [address]  [place], [address]  [phone] fax    Plan of Care/ Statement of Necessity for Physical Therapy Services    Patient name: Argelia Posey Start of Care: 2018   Referral source: Harper Pedroza NP : 1965    Medical Diagnosis: Lumbago with sciatica, right side [M54.41]  Payor: Birdie Lemus / Plan: Freddy Chiu / Product Type: Commerical /  Onset Date: 1.5 weeks ago    Treatment Diagnosis: l/s strain   Prior Hospitalization: see medical history Provider#: [id number]   Medications: Verified on Patient summary List    Comorbidities: appendectomy, tonsillectomy, hysterectomy, obesity, prior intermittent LBP   Prior Level of Function: sedentary void of severe LBP or functional deficits    The Plan of Care and following information is based on the information from the initial evaluation. Assessment/ key information: Pt is a 48year old female presenting with reports of onset of back pain 1.5 weeks ago after standing up from a chair at work. She reports pain initially subsided and ws sore that evening, but reports the next day having a hard time getting out of bed and the day after that having to crawl out of her house to get to the doctor because her back was in so much pain. She reports initially pain was sharp in her back and referred sharp pain into B ant thighs towards her knees. Since then, her pain has subsided and she now presents with dull aching pain and stiffness more in the right side of her lower back. Signs and symptoms appear consistent with lumbar strain with associated deficits consisting of limited trunk ROM, TTP R lumbar paraspinals, pain with right lumbar paraspinal activity, and limited right hip ext strength 2/2 back pain provocation. She also demonstrates significant fear avoidance of movement.  Pt will benefit from skilled PT management to address their impairments and improve their level of function to begin with aquatic therapy interventions. Evaluation Complexity History MEDIUM  Complexity : 1-2 comorbidities / personal factors will impact the outcome/ POC ; Examination MEDIUM Complexity : 3 Standardized tests and measures addressing body structure, function, activity limitation and / or participation in recreation  ;Presentation LOW Complexity : Stable, uncomplicated  ;Clinical Decision Making MEDIUM Complexity : FOTO score of 26-74  Overall Complexity Rating: LOW   Problem List: pain affecting function, decrease ROM, decrease strength, decrease ADL/ functional abilitiies, decrease activity tolerance and decrease flexibility/ joint mobility   Treatment Plan may include any combination of the following: Therapeutic exercise, Therapeutic activities, Neuromuscular re-education, Physical agent/modality, Manual therapy, Aquatic therapy, Patient education and Self Care training  Patient / Family readiness to learn indicated by: asking questions, trying to perform skills and interest  Persons(s) to be included in education: patient (P)  Barriers to Learning/Limitations: None  Patient Goal (s): Strengthen back and relieve pain.   Patient Self Reported Health Status: good  Rehabilitation Potential: good    Short Term Goals: To be accomplished in 2 weeks:   1. Patient will report performance of HEP at least 2 times per day to facilitate improved outcomes and improved self management. Long Term Goals: To be accomplished in 4 weeks:   1. Patient will report FOTO score of 86 or better to indicate significant improvement in functional status. 2. Pt will demonstrate full trunk SB and rotation bilat void of pain exacerbation to improve ease of daily activity. 3. Pt will demonstrate ability to perform squat with 15# KB void of back pain provocation to improve ease of functional lifting tasks.   Frequency / Duration: Patient to be seen 1 times per week for 5 treatments. (limited 2/2 financial concerns d/t high copay)    Patient/ Caregiver education and instruction: Diagnosis, prognosis, self care, activity modification and exercises   [x]  Plan of care has been reviewed with ALEX Lindsay PT, DPT, ATC 12/13/2018 7:58 PM    ________________________________________________________________________    I certify that the above Therapy Services are being furnished while the patient is under my care. I agree with the treatment plan and certify that this therapy is necessary.     Physician's Signature:____________Date:_________TIME:________    ** Signature, Date and Time must be completed for valid certification **    Please sign and return to [facility] [address] [place] [address] & [facility]  [address] [place] [address] [address]  [phone] fax

## 2018-12-20 ENCOUNTER — HOSPITAL ENCOUNTER (OUTPATIENT)
Dept: PHYSICAL THERAPY | Age: 53
Discharge: HOME | End: 2018-12-20
Payer: COMMERCIAL

## 2018-12-20 PROCEDURE — 97113 AQUATIC THERAPY/EXERCISES: CPT

## 2018-12-20 NOTE — PROGRESS NOTES
PT DAILY TREATMENT NOTE 10-18    Patient Name: Stephanie Piña  Date:2018  : 1965  [x]  Patient  Verified  Payor: Vimal James / Plan: Reyearash / Product Type: Commerical /    In time:4:00  Out time:4:30  Total Treatment Time (min): 30  Visit #: 2 of 5    Medicare/BCBS Only   Total Timed Codes (min):  30 1:1 Treatment Time:  30       Treatment Area: Lumbago with sciatica, right side [M54.41]    SUBJECTIVE  Pain Level (0-10 scale): 0  Any medication changes, allergies to medications, adverse drug reactions, diagnosis change, or new procedure performed?: [x] No    [] Yes (see summary sheet for update)  Subjective functional status/changes:   [] No changes reported  \"I'm a little scared, I've never been in a pool before\"    OBJECTIVE    30 min Therapeutic Exercise:  [x] See flow sheet :   Rationale: increase ROM and increase strength to improve the patients ability to perform ADLs            With   [] TE   [] TA   [] neuro   [] other: Patient Education: [x] Review HEP    [] Progressed/Changed HEP based on:   [] positioning   [] body mechanics   [] transfers   [] heat/ice application    [] other:      Other Objective/Functional Measures:   Initiated POC per flowsheet     Pain Level (0-10 scale) post treatment: 0    ASSESSMENT/Changes in Function: Initiated aquatic exercise program per flowsheet. Upon arrival pt reported feeling nervous about getting into the pool as she had never been in a pool before, however, she was able complete program without difficulty and reported enjoying the water at the end of treatment. Noted limited R trunk rotation with pain at end range. Vc's for TA activation with therex. Pt reports compliance with HEP.       Patient will continue to benefit from skilled PT services to modify and progress therapeutic interventions, address functional mobility deficits, address ROM deficits, address strength deficits, analyze and address soft tissue restrictions, analyze and cue movement patterns and assess and modify postural abnormalities to attain remaining goals. []  See Plan of Care  []  See progress note/recertification  []  See Discharge Summary         Progress towards goals / Updated goals:  Short Term Goals: To be accomplished in 2 weeks:               1. Patient will report performance of HEP at least 2 times per day to facilitate improved outcomes and improved self management. Met- pt reports compliance     Long Term Goals: To be accomplished in 4 weeks:               1. Patient will report FOTO score of 86 or better to indicate significant improvement in functional status. 2. Pt will demonstrate full trunk SB and rotation bilat void of pain exacerbation to improve ease of daily activity. 3. Pt will demonstrate ability to perform squat with 15# KB void of back pain provocation to improve ease of functional lifting tasks.         PLAN  [x]  Upgrade activities as tolerated     [x]  Continue plan of care  []  Update interventions per flow sheet       []  Discharge due to:_  []  Other:_      Stephani Fay, ALEX 12/20/2018  4:06 PM    Future Appointments   Date Time Provider Roosevelt Guillory   12/27/2018 12:45 PM Alesha Chaidez, PT [facility]   1/3/2019  4:00 PM Johnie Bradford [facility]   1/10/2019  4:00 PM Jay South, PT [facility]   2/21/2019  3:45 PM Sintia Simon MD [address]

## 2018-12-27 ENCOUNTER — HOSPITAL ENCOUNTER (OUTPATIENT)
Dept: PHYSICAL THERAPY | Age: 53
Discharge: HOME | End: 2018-12-27
Payer: COMMERCIAL

## 2018-12-27 DIAGNOSIS — E78.5 HYPERLIPIDEMIA, UNSPECIFIED HYPERLIPIDEMIA TYPE: ICD-10-CM

## 2018-12-27 PROCEDURE — 97113 AQUATIC THERAPY/EXERCISES: CPT

## 2018-12-27 RX ORDER — PRAVASTATIN SODIUM 40 MG/1
TABLET ORAL
Qty: 90 TAB | Refills: 3 | Status: SHIPPED | OUTPATIENT
Start: 2018-12-27 | End: 2019-12-23

## 2018-12-27 NOTE — PROGRESS NOTES
PT DAILY TREATMENT NOTE     Patient Name: Cristela Viramontes  Date:2018  : 1965  [x]  Patient  Verified  Payor: Sona Yao / Plan: Reyesside / Product Type: Commerical /    In time:12:45pm  Out time:1:25pm  Total Treatment Time (min): 40  Visit #: 3 of 5    Treatment Area: Lumbago with sciatica, right side [M54.41]    SUBJECTIVE  Pain Level (0-10 scale): 1/10  Any medication changes, allergies to medications, adverse drug reactions, diagnosis change, or new procedure performed?: [x] No    [] Yes (see summary sheet for update)  Subjective functional status/changes:   [] No changes reported  Pt reports feeling \"a little sore after last time\". Pt states, \"I'm only coming once a week and just doing a few sessions because of my high co-pay\". OBJECTIVE      40 min Therapeutic Exercise:  [x] See flow sheet : Aquatic therapy    Rationale: increase ROM and increase strength to improve the patients ability to perform ADLs and functional mobility tasks with improved ease and decreased pain. With   [] TE   [] TA   [] neuro   [] other: Patient Education: [x] Review HEP    [] Progressed/Changed HEP based on:   [] positioning   [] body mechanics   [] transfers   [] heat/ice application    [] other:      Other Objective/Functional Measures: Trunk AROM: B side-bending: WFL, right rotation: 75%, left rotation: WFL. Pain Level (0-10 scale) post treatment: 1/10    ASSESSMENT/Changes in Function: Pt reports much improvement in her posture and her general mobility as compared to at the time of her initial evaluation. Pt will transition to land-based PT next session.       Patient will continue to benefit from skilled PT services to modify and progress therapeutic interventions, address functional mobility deficits, address ROM deficits, address strength deficits, analyze and cue movement patterns, analyze and modify body mechanics/ergonomics and assess and modify postural abnormalities to attain remaining goals. []  See Plan of Care  []  See progress note/recertification  []  See Discharge Summary         Progress towards goals / Updated goals:  Short Term Goals: To be accomplished in 2 weeks:               1. Patient will report performance of HEP at least 2 times per day to facilitate improved outcomes and improved self management. Met- pt reports compliance     Long Term Goals: To be accomplished in 4 weeks:               1. Patient will report FOTO score of 86 or better to indicate significant improvement in functional status.              2. Pt will demonstrate full trunk SB and rotation bilat void of pain exacerbation to improve ease of daily activity. Progressing: Trunk AROM: B trunk side bending: WFL, right rotation: 75% with c/o back \"tightness\",  left rotation: WFL. (12/27/18).                      3. Pt will demonstrate ability to perform squat with 15# KB void of back pain provocation to improve ease of functional lifting tasks.           PLAN  [x]  Upgrade activities as tolerated     [x]  Continue plan of care  []  Update interventions per flow sheet       []  Discharge due to:_  []  Other:_      Andrew Jacob PT 12/27/2018  12:58 PM    Future Appointments   Date Time Provider Roosevelt Guillory   1/3/2019  4:00 PM Ivon Moore [facility]   1/10/2019  4:00 PM Belinda Barrett, PT [facility]   2/21/2019  3:45 PM Sharif Spencer MD [address]

## 2018-12-27 NOTE — TELEPHONE ENCOUNTER
PCP: Hanh Estes NP    Last appt: 9/25/2018  Future Appointments   Date Time Provider Roosevelt Tysoni   [facility]   1/10/2019  4:00 PM Twila Duarte, PT [facility]   2/21/2019  3:45 PM Lilia Dexter MD [address]       Requested Prescriptions     Pending Prescriptions Disp Refills    pravastatin (PRAVACHOL) 40 mg tablet 90 Tab 3     Sig: TAKE 1 TABLET BY MOUTH NIGHTLY.

## 2019-01-03 ENCOUNTER — HOSPITAL ENCOUNTER (OUTPATIENT)
Dept: PHYSICAL THERAPY | Age: 54
Discharge: HOME | End: 2019-01-03
Payer: COMMERCIAL

## 2019-01-03 PROCEDURE — 97110 THERAPEUTIC EXERCISES: CPT

## 2019-01-03 PROCEDURE — 97112 NEUROMUSCULAR REEDUCATION: CPT

## 2019-01-03 NOTE — PROGRESS NOTES
PT DAILY TREATMENT NOTE 10-18    Patient Name: Emory Dye  Date:1/3/2019  : 1965  [x]  Patient  Verified  Payor: Deidre Acosta / Plan: [employer] / Product Type: Commerical /    In time:4:02  Out time:4:50  Total Treatment Time (min): 48  Visit #: 4 of 5    Treatment Area: Lumbago with sciatica, right side [M54.41]    SUBJECTIVE  Pain Level (0-10 scale): 2  Any medication changes, allergies to medications, adverse drug reactions, diagnosis change, or new procedure performed?: [x] No    [] Yes (see summary sheet for update)  Subjective functional status/changes:   [] No changes reported  \"I'm hurting a little bit. I think I overdid it at work today. \"    OBJECTIVE        28 min Therapeutic Exercise:  [x] See flow sheet :   Rationale: increase ROM, increase strength and improve coordination to improve the patients ability to increase ease with ADLs         10 min Neuromuscular Re-education:  [x]  See flow sheet :   Rationale: increase strength, improve coordination and increase proprioception  to improve the patients ability to improve lumbopelvic mobility    Modality rationale: decrease pain and increase tissue extensibility to improve the patients ability to ease ADL tolerance   Min Type Additional Details    [] Estim:  []Unatt       []IFC  []Premod                        []Other:  []w/ice   []w/heat  Position:  Location:    [] Estim: []Att    []TENS instruct  []NMES                    []Other:  []w/US   []w/ice   []w/heat  Position:  Location:    []  Traction: [] Cervical       []Lumbar                       [] Prone          []Supine                       []Intermittent   []Continuous Lbs:  [] before manual  [] after manual    []  Ultrasound: []Continuous   [] Pulsed                           []1MHz   []3MHz Location:  W/cm2:    []  Iontophoresis with dexamethasone         Location: [] Take home patch   [] In clinic   10 []  Ice     [x]  heat  []  Ice massage  [] Laser   []  Anodyne Position: seated  Location: l/s    []  Laser with stim  []  Other: Position:  Location:    []  Vasopneumatic Device Pressure:       [] lo [] med [] hi   Temperature: [] lo [] med [] hi   [] Skin assessment post-treatment:  []intact []redness- no adverse reaction    []redness - adverse reaction:                 With   [] TE   [] TA   [] neuro   [] other: Patient Education: [x] Review HEP    [] Progressed/Changed HEP based on:   [] positioning   [] body mechanics   [] transfers   [] heat/ice application    [] other:      Other Objective/Functional Measures:   First land based session     Pain Level (0-10 scale) post treatment: 1    ASSESSMENT/Changes in Function:   Initiated land-based physical therapy today. Limited trunk AROM. Patient will continue to benefit from skilled PT services to modify and progress therapeutic interventions, address functional mobility deficits, address ROM deficits, address strength deficits, analyze and address soft tissue restrictions, analyze and cue movement patterns, analyze and modify body mechanics/ergonomics and assess and modify postural abnormalities to attain remaining goals. []  See Plan of Care  []  See progress note/recertification  []  See Discharge Summary         Progress towards goals / Updated goals:  Short Term Goals: To be accomplished in 2 weeks:               1. Patient will report performance of HEP at least 2 times per day to facilitate improved outcomes and improved self management. Met- pt reports compliance     Long Term Goals: To be accomplished in 4 weeks:               1. Patient will report FOTO score of 86 or better to indicate significant improvement in functional status.              2. Pt will demonstrate full trunk SB and rotation bilat void of pain exacerbation to improve ease of daily activity. Progressing: Trunk AROM: B trunk side bending: WFL, right rotation: 75% with c/o back \"tightness\",  left rotation: WFL. (12/27/18).              3. Pt will demonstrate ability to perform squat with 15# KB void of back pain provocation to improve ease of functional lifting tasks.         PLAN  []  Upgrade activities as tolerated     [x]  Continue plan of care  []  Update interventions per flow sheet       []  Discharge due to:_  []  Other:_      Maribell Ventura 1/3/2019  3:49 PM    Future Appointments   Date Time Provider Roosevelt Guillory   1/3/2019  4:00 PM Lisa Sanchez [facility]   1/10/2019  4:00 PM Rene Loyd, PT [facility]   2/21/2019  3:45 PM Soumya Pastrana MD [address]

## 2019-01-10 ENCOUNTER — HOSPITAL ENCOUNTER (OUTPATIENT)
Dept: PHYSICAL THERAPY | Age: 54
Discharge: HOME | End: 2019-01-10
Payer: COMMERCIAL

## 2019-01-10 PROCEDURE — 97110 THERAPEUTIC EXERCISES: CPT

## 2019-01-10 NOTE — PROGRESS NOTES
PT DISCHARGE DAILY NOTE AND [id number]    Date:1/10/2019  Patient name: Russel Phalen Start of Care: 18   Referral source: Mando Cee NP : 1965   Medical/Treatment Diagnosis: Lumbago with sciatica, right side [M54.41] Onset Date:1.5 weeks ago     Prior Hospitalization: see medical history Provider#: [id number]   Medications: Verified on Patient Summary List    Comorbidities: appendectomy, tonsillectomy, hysterectomy, obesity, prior intermittent LBP  Prior Level of Function:sedentary void of severe LBP or functional deficits      Visits from Start of Care: 5    Missed Visits: 0    Reporting Period : 18 to 1/10/19    Date:1/10/2019  : 1965  [x]  Patient  Verified  Payor: Dashawn Frame / Plan: [employer] / Product Type: Commerical /    In time:4:00  Out time:4:32  Total Treatment Time (min): 32  Visit #: 5 of 5      SUBJECTIVE  Pain Level (0-10 scale): 2-3  Any medication changes, allergies to medications, adverse drug reactions, diagnosis change, or new procedure performed?: [x] No    [] Yes (see summary sheet for update)  Subjective functional status/changes:   [] No changes reported  \"I think I over did it at work today\". Today is patients last session. Patient states she can continue her exercises on her own at home, she states she has made progress in therapy, but it is getting too expensive. Patient educated on importance of continued participation in [facility]. OBJECTIVE      32 min Therapeutic Exercise:  [x] See flow sheet :   Rationale: increase ROM and increase strength to improve the patients ability to perform ADLs with improved ease.                With   [x] TE   [] TA   [] neuro   [] other: Patient Education: [x] Review HEP    [] Progressed/Changed HEP based on:   [] positioning   [] body mechanics   [] transfers   [] heat/ice application    [] other:      Other Objective/Functional Measures: Trunk rotation and side bending B/L: WFL     Pain Level (0-10 scale) post treatment: 2    Summary of Care: Patient demonstrated lumbar mobility WNL in all directions void of pain. Patient is able to ambulate with more upright posture secondary to pain reduction. Patient states she has been able to return to job related task without difficulty, however states when she does too much at work she will get some pain in the evening. Improvements noted in patients quality of mobility with sit to stands. Patient still requires increased time for bed mobility. Goal: Patient will report FOTO score of 86 or better to indicate significant improvement in functional status. Status at last note/certification: Not met  Status at discharge: not met    Goal: Pt will demonstrate full trunk SB and rotation bilat void of pain exacerbation to improve ease of daily activity.   Status at last note/certification: Progressing   Status at discharge: met    Goal: Pt will demonstrate ability to perform squat with 15# KB void of back pain provocation to improve ease of functional lifting tasks. Status at last note/certification: Not met   Status at discharge:  Not met; however patient states she is able to lift things at work without difficulty. Goal: Patient will report performance of HEP at least 2 times per day to facilitate improved outcomes and improved self management. Status at last note/certification: Goal met  Status at discharge: met    ASSESSMENT/Changes in Function: Patient demonstrates all exercises without increase in symptoms. Patient demonstrates improvements in lumbar mobility in all directions void of pain. Patient performs squats today with proper mechanics. Patient states she has made great improvements since beginning therapy, however she states it is getting too expensive for her to come. Patient educated on importance of continued participation with HEP exercises in order to maintain achievements made in therapy.       Thank you for this referral! PLAN  []Discontinue therapy: [x]Patient has reached or is progressing toward set goals      []Patient is non-compliant or has abdicated      []Due to lack of appreciable progress towards set [id number] [facility],[address], PT 1/10/2019  4:09 PM

## 2019-03-13 ENCOUNTER — OFFICE VISIT (OUTPATIENT)
Dept: NEUROLOGY | Age: 54
End: 2019-03-13

## 2019-03-13 VITALS
WEIGHT: 250 LBS | RESPIRATION RATE: 16 BRPM | SYSTOLIC BLOOD PRESSURE: 110 MMHG | HEIGHT: 62 IN | OXYGEN SATURATION: 99 % | TEMPERATURE: 98.7 F | HEART RATE: 66 BPM | DIASTOLIC BLOOD PRESSURE: 60 MMHG | BODY MASS INDEX: 46.01 KG/M2

## 2019-03-13 DIAGNOSIS — E66.01 MORBID OBESITY (HCC): ICD-10-CM

## 2019-03-13 DIAGNOSIS — G47.33 OSA (OBSTRUCTIVE SLEEP APNEA): Primary | ICD-10-CM

## 2019-03-13 NOTE — PROGRESS NOTES
ROOM # 4    Luis Alfredo Irvin presents today for   Chief Complaint   Patient presents with    Follow-up    CPAP       Luis Alfredo Irvin preferred language for health care discussion is english/other. Depression Screening:  3 most recent [place] Screens 3/13/2019 12/5/2018 11/21/2018 9/11/2018 10/25/2017 7/7/2017 7/11/2016   PHQ Not Done - - - - - Active Diagnosis of Depression or Bipolar Disorder Active Diagnosis of Depression or Bipolar Disorder   Little interest or pleasure in doing things Not at all Not at all Not at all Not at all Not at all - -   Feeling down, depressed, irritable, or hopeless Not at all Not at all Not at all Not at all Not at all - -   Total Score PHQ 2 0 0 0 0 0 - -       Learning Assessment:  Learning Assessment 9/11/2018 11/3/2015 11/21/2013 11/30/2012   PRIMARY LEARNER Patient Patient Patient Patient   HIGHEST LEVEL OF EDUCATION - PRIMARY LEARNER  GRADUATED HIGH SCHOOL OR GED GRADUATED HIGH SCHOOL OR GED - -   BARRIERS PRIMARY LEARNER NONE NONE - -   CO-LEARNER CAREGIVER No No - -   [facility]    NEED No - - -   LEARNER PREFERENCE PRIMARY DEMONSTRATION DEMONSTRATION - LISTENING   ANSWERED BY patient patient patient patient   RELATIONSHIP SELF SELF SELF SELF       Abuse Screening:  Abuse Screening Questionnaire 9/11/2018 2/21/2014   Do you ever feel afraid of your partner? N -   Are you in a relationship with someone who physically or mentally threatens you? N N   Is it safe for you to go home? Y Y       Fall Risk  No flowsheet data found. Visit Vitals  /60 (BP 1 Location: Left arm, BP Patient Position: Sitting)   Pulse 66   Temp 98.7 °F (37.1 °C) (Oral)   Resp 16   Ht 5' 2\" (1.575 m)   Wt 250 lb (113.4 kg)   SpO2 99%   BMI 45.73 kg/m²       Health Maintenance reviewed and discussed per provider.  Yes    Luis Alfredo Irvin is due for   Health Maintenance Due   Topic Date Due    Shingrix Vaccine Age 49> (1 of 2) 01/22/2015    FOBT Q 1 YEAR AGE 50-75  01/22/2015     Please order/place referral if appropriate. Advance Directive:  1. Do you have an advance directive in place? Patient Reply: No    2. If not, would you like material regarding how to put one in place? Patient Reply: No    Coordination of Care:  1. Have you been to the ER, urgent care clinic since your last visit? Hospitalized since your last visit?  No

## 2019-03-13 NOTE — PROGRESS NOTES
3/13/2019 4:20 PM    SSN: [id number]    Subjective:   3year-old female for follow-up of chief complaint of history of obstructive sleep apnea. She remains compliant on CPAP mode through February 18 showed 90/90 days of use with average use of 5 hours and 17 minutes, AHI of 2.8, median pressure of 14.6 cm of H2O with max of 19 and median leak of 0.1 L/min. She is on auto CPAP with a minimum pressure of 7 and a maximum of 20. She does feel rested. She is wishes she had a local company as she is having problems trying to procure supplies, she calls the number she is supposed to call in the keep her on hold forever or they do not respond to her calls. She asks if she could go to a local [employer] to get her supplies. She has [employer].     Social History     Socioeconomic History    Marital status:      Spouse name: Not on file    Number of children: Not on file    Years of education: Not on file    Highest education level: Not on file   Social Needs    Financial resource strain: Not on file    Food insecurity - worry: Not on file    Food insecurity - inability: Not on file    Transportation needs - medical: Not on file   "[employer] needs - non-medical: Not on file   Occupational History    Not on file   Tobacco Use    Smoking status: Never Smoker    Smokeless tobacco: Never Used   Substance and Sexual Activity    Alcohol use: No     Alcohol/week: 0.0 oz    Drug use: No    Sexual activity: Not on file   Other Topics Concern    Not on file   Social History Narrative    Not on file       Family History   Problem Relation Age of Onset    Hypertension Mother     Glaucoma Mother     Hypertension Father     Stroke Brother     Heart Disease Maternal Aunt         chf    Heart Failure Maternal Aunt         MI    Diabetes Maternal Uncle     Heart Failure Other         MI    Stroke Other     Colon Cancer Other        Current Outpatient Medications   Medication Sig Dispense Refill    pravastatin (PRAVACHOL) 40 mg tablet TAKE 1 TABLET BY MOUTH NIGHTLY. 90 Tab 3    losartan (COZAAR) 100 mg tablet TAKE 1 TAB BY MOUTH DAILY. 90 Tab 1    citalopram (CELEXA) 10 mg tablet TAKE 1/2 TABLET BY MOUTH ONCE DAILY 45 Tab 3    Cholecalciferol, Vitamin D3, (VITAMIN D3) 2,000 unit cap 1 cap daily   30 Cap prn    CALCIUM CITRATE/VITAMIN D3 (CITRACAL + D PO) Take  by mouth daily.  MULTIVITAMINS (MULTIVITAMIN PO) Take 1 Tab by mouth daily.  predniSONE (STERAPRED DS) 10 mg dose pack See administration instruction per 10mg dose pack 21 Tab 0       Past Medical History:   Diagnosis Date    Allergy     Anxiety 4/24/2010    Essential hypertension     HTN (hypertension) 4/24/2010    Hyperlipemia 4/24/2010    Hypokalemia 4/24/2010    Scoliosis        Past Surgical History:   Procedure Laterality Date    HX APPENDECTOMY  1979    HX HYSTERECTOMY  05/07/08    partial       Allergies   Allergen Reactions    Hydrochlorothiazide Other (comments)     TINGLING IN BACK OF HEAD    Norvasc [Amlodipine] Other (comments)     Tingling in back of head       Vital signs:    Visit Vitals  /60 (BP 1 Location: Left arm, BP Patient Position: Sitting)   Pulse 66   Temp 98.7 °F (37.1 °C) (Oral)   Resp 16   Ht 5' 2\" (1.575 m)   Wt 113.4 kg (250 lb)   LMP  (LMP Unknown)   SpO2 99%   BMI 45.73 kg/m²       Review of Systems:   GENERAL: Denies fever or fatigue  CARDIAC: No CP or SOB  PULMONARY: No cough of SOB  MUSCULOSKELETAL: No new joint pain  NEURO: SEE HPI      EXAM: Alert, in NAD. Heart is regular. Oriented x3, EOM's are full, PERRL, no facial asymmetries. Strength and tone are normal. DTR's +2, gait symmetric           Assessment/Plan: Obstructive sleep apnea, well treated on CPAP with great compliance. I commended her on this. I advised her to gradually try to get a minimum of 7 hours for optimal results.   I gave her a prescription to take to the medical equipment company of her choice to see if she can get supplies locally and more officially. She may be restricted by contracts between DME and he denied her health. She will otherwise return to see me in 6 months. PLEASE NOTE:   Portions of this document may have been produced using voice recognition software. Unrecognized errors in transcription may be present. This note will not be viewable in [address]

## 2019-06-21 DIAGNOSIS — F41.8 DEPRESSION WITH ANXIETY: ICD-10-CM

## 2019-06-23 RX ORDER — LOSARTAN POTASSIUM 100 MG/1
TABLET ORAL
Qty: 90 TAB | Refills: 1 | Status: SHIPPED | OUTPATIENT
Start: 2019-06-23 | End: 2019-12-23

## 2019-06-23 RX ORDER — CITALOPRAM 10 MG/1
TABLET ORAL
Qty: 45 TAB | Refills: 3 | Status: SHIPPED | OUTPATIENT
Start: 2019-06-23 | End: 2020-06-22

## 2019-09-05 ENCOUNTER — OFFICE VISIT (OUTPATIENT)
Dept: FAMILY MEDICINE CLINIC | Age: 54
End: 2019-09-05

## 2019-09-05 VITALS
HEIGHT: 62 IN | DIASTOLIC BLOOD PRESSURE: 88 MMHG | SYSTOLIC BLOOD PRESSURE: 128 MMHG | TEMPERATURE: 98.6 F | BODY MASS INDEX: 46.85 KG/M2 | HEART RATE: 60 BPM | WEIGHT: 254.6 LBS | RESPIRATION RATE: 18 BRPM | OXYGEN SATURATION: 99 %

## 2019-09-05 DIAGNOSIS — Z12.11 COLON CANCER SCREENING: ICD-10-CM

## 2019-09-05 DIAGNOSIS — Z23 ENCOUNTER FOR IMMUNIZATION: ICD-10-CM

## 2019-09-05 DIAGNOSIS — K21.9 GASTROESOPHAGEAL REFLUX DISEASE, ESOPHAGITIS PRESENCE NOT SPECIFIED: Primary | ICD-10-CM

## 2019-09-05 DIAGNOSIS — Z29.9 PREVENTIVE MEASURE: ICD-10-CM

## 2019-09-05 RX ORDER — PHENOL/SODIUM PHENOLATE
20 AEROSOL, SPRAY (ML) MUCOUS MEMBRANE DAILY
Qty: 30 TAB | Refills: 0 | Status: SHIPPED | OUTPATIENT
Start: 2019-09-05 | End: 2019-09-27 | Stop reason: SDUPTHER

## 2019-09-05 NOTE — PROGRESS NOTES
Soumya Garcia presents today for   Chief Complaint   Patient presents with    Epigastric Pain     c/o of upper gastric discomfort/ fullness for weeks. Tried OTC prilosec. Is someone accompanying this pt? No    Is the patient using any DME equipment during OV? No    Depression Screening:  3 most recent PHQ Screens 3/13/2019   PHQ Not Done -   Little interest or pleasure in doing things Not at all   Feeling down, depressed, irritable, or hopeless Not at all   Total Score PHQ 2 0       Learning Assessment:  Learning Assessment 9/11/2018   PRIMARY LEARNER Patient   HIGHEST LEVEL OF EDUCATION - PRIMARY LEARNER  GRADUATED HIGH SCHOOL OR GED   BARRIERS PRIMARY LEARNER NONE   CO-LEARNER CAREGIVER No   PRIMARY LANGUAGE ENGLISH    NEED No   LEARNER PREFERENCE PRIMARY DEMONSTRATION   ANSWERED BY patient   RELATIONSHIP SELF       Abuse Screening:  Abuse Screening Questionnaire 9/11/2018   Do you ever feel afraid of your partner? N   Are you in a relationship with someone who physically or mentally threatens you? N   Is it safe for you to go home? Y         Health Maintenance Due   Topic Date Due    Shingrix Vaccine Age 50> (1 of 2) 01/22/2015    FOBT Q 1 YEAR AGE 50-75  01/22/2015    Influenza Age 5 to Adult  08/01/2019   . Health Maintenance reviewed and discussed and ordered per Provider. Soumya Garcia is updated on all     Coordination of Care  1. Have you been to the ER, urgent care clinic since your last visit? Hospitalized since your last visit? No    2. Have you seen or consulted any other health care providers outside of the [address] [place] [address] since your last visit? Include any pap smears or colon screening. No          Advance Directive:  1. Do you have an advance directive in place? Patient Reply:No    2. If not, would you like material regarding how to put one in place?  Patient Reply: No

## 2019-09-05 NOTE — PROGRESS NOTES
Manuel Loges 1965 female who presents for routine immunizations. Patient denies any symptoms , reactions or allergies that would exclude them from being immunized today. Risks and adverse reactions were discussed and the VIS was given to them. All questions were addressed. Order placed for Flulaval,  per Verbal Order from DANICA Mcgrath with read back. Patient was observed for 15 min post injection. There were no reactions observed. Also noted that patient has household/spouse hx of HIV(Yes answer to #7 on vaccine consent). Patients most recent HIV test is negative and per Provider flu vaccine may be given.      Yaritza James 75, LPN

## 2019-09-09 NOTE — PROGRESS NOTES
DORON Morris is a 47 y.o. female  Chief Complaint   Patient presents with    Epigastric Pain     c/o of upper gastric discomfort/ fullness for weeks. Tried OTC prilosec. Reports she had pain in her mid-abdominal region and it actually hurt worse when she pushed deeply on the area. She admits that she took a 14 day course of generic Prilosec which eliminated the pain but as soon as she stopped taking the medicine the pain came back. She states that she does not have the associated tenderness with palpation after the medication however. She states that she sometimes wakes up at night after burping and she wears a CPAP so the burping is troublesome and loud. She also reports that she has a white coating on her tongue in the morning upon awakening. No associated chest pain or shortness of breath. The coating on her tongue is able to be brushed away with toothbrushing and causes no pain or discomfort. She would also like to get back on track with her lab work and health maintenance. Past Medical History  Past Medical History:   Diagnosis Date    Allergy     Anxiety 4/24/2010    Essential hypertension     HTN (hypertension) 4/24/2010    Hyperlipemia 4/24/2010    Hypokalemia 4/24/2010    Scoliosis        Surgical History  Past Surgical History:   Procedure Laterality Date    HX APPENDECTOMY  1979    HX HYSTERECTOMY  05/07/08    partial        Medications  Current Outpatient Medications   Medication Sig Dispense Refill    Omeprazole delayed release (PRILOSEC D/R) 20 mg tablet Take 1 Tab by mouth daily. 30 Tab 0    citalopram (CELEXA) 10 mg tablet TAKE 1/2 TABLET BY MOUTH ONCE DAILY 45 Tab 3    losartan (COZAAR) 100 mg tablet TAKE 1 TABLET BY MOUTH EVERY DAY 90 Tab 1    pravastatin (PRAVACHOL) 40 mg tablet TAKE 1 TABLET BY MOUTH NIGHTLY.  90 Tab 3    Cholecalciferol, Vitamin D3, (VITAMIN D3) 2,000 unit cap 1 cap daily   30 Cap prn    CALCIUM CITRATE/VITAMIN D3 (CITRACAL + D PO) Take  by mouth daily.      MULTIVITAMINS (MULTIVITAMIN PO) Take 1 Tab by mouth daily.       predniSONE (STERAPRED DS) 10 mg dose pack See administration instruction per 10mg dose pack 21 Tab 0       Allergies  Allergies   Allergen Reactions    Hydrochlorothiazide Other (comments)     TINGLING IN BACK OF HEAD    Norvasc [Amlodipine] Other (comments)     Tingling in back of head       Family History  Family History   Problem Relation Age of Onset    Hypertension Mother     Glaucoma Mother     Hypertension Father     Stroke Brother     Heart Disease Maternal Aunt         chf    Heart Failure Maternal Aunt         MI    Diabetes Maternal Uncle     Heart Failure Other         MI    Stroke Other     Colon Cancer Other        Social History  Social History     Socioeconomic History    Marital status:      Spouse name: Not on file    Number of children: Not on file    Years of education: Not on file    Highest education level: Not on file   Occupational History    Not on file   Social Needs    Financial resource strain: Not on file    Food insecurity:     Worry: Not on file     Inability: Not on file    Transportation needs:     Medical: Not on file     Non-medical: Not on file   Tobacco Use    Smoking status: Never Smoker    Smokeless tobacco: Never Used   Substance and Sexual Activity    Alcohol use: No     Alcohol/week: 0.0 standard drinks    Drug use: No    Sexual activity: Not on file   Lifestyle    Physical activity:     Days per week: Not on file     Minutes per session: Not on file    Stress: Not on file   Relationships    Social connections:     Talks on phone: Not on file     Gets together: Not on file     Attends Catholic service: Not on file     Active member of club or organization: Not on file     Attends meetings of clubs or organizations: Not on file     Relationship status: Not on file    Intimate partner violence:     Fear of current or ex partner: Not on file     Emotionally abused: Not on file     Physically abused: Not on file     Forced sexual activity: Not on file   Other Topics Concern    Not on file   Social History Narrative    Not on file       Problem List  Patient Active Problem List   Diagnosis Code    HTN (hypertension) I10    Hyperlipidemia E78.5    Anxiety F41.9    Hypokalemia E87.6    Tinnitus of both ears H93.13    HIV exposure Z20.6    Diverticula of colon K57.30    RUQ abdominal pain R10.11    Vitamin D deficiency E55.9    Hyperlipidemia E78.5    Snoring R06.83    SEFERINO on CPAP G47.33, Z99.89    Prediabetes R73.03    Obesity, morbid ([facility] [address] E66.01       Review of Systems  Review of Systems   Constitutional: Negative for chills and fever. HENT: Negative for sore throat. Respiratory: Negative for shortness of breath. Cardiovascular: Negative for chest pain. Gastrointestinal: Positive for abdominal pain, heartburn and nausea. Negative for blood in stool, constipation, diarrhea and vomiting. Genitourinary: Negative for dysuria, flank pain, frequency, hematuria and urgency. Vital Signs  Vitals:    09/05/19 1634   BP: 128/88   Pulse: 60   Resp: 18   Temp: 98.6 °F (37 °C)   TempSrc: Oral   SpO2: 99%   Weight: 254 lb 9.6 oz (115.5 kg)   Height: 5' 2\" (1.575 m)   PainSc:   0 - No pain       Physical Exam  Physical Exam   Constitutional: She is oriented to person, place, and time. HENT:   Mouth/Throat: Oropharynx is clear and moist.   No exudate noted   Eyes: Pupils are equal, round, and reactive to light. Cardiovascular: Normal rate, regular rhythm and normal heart sounds. Pulmonary/Chest: Effort normal and breath sounds normal. No respiratory distress. Abdominal: Soft. Bowel sounds are normal. There is no tenderness. There is no guarding. Neurological: She is alert and oriented to person, place, and time. Skin: Skin is warm and dry. Vitals reviewed.       Diagnostics  Orders Placed This Encounter    METABOLIC PANEL, COMPREHENSIVE Standing Status:   Future     Standing Expiration Date:   9/5/2020    LIPID PANEL     Standing Status:   Future     Standing Expiration Date:   9/5/2020    CBC WITH AUTOMATED DIFF     Standing Status:   Future     Standing Expiration Date:   9/5/2020    TSH 3RD GENERATION     Standing Status:   Future     Standing Expiration Date:   9/5/2020    HEMOGLOBIN A1C WITH EAG     Standing Status:   Future     Standing Expiration Date:   9/5/2020    VITAMIN D, 25 HYDROXY     Standing Status:   Future     Standing Expiration Date:   9/5/2020    HIV 1/2 AG/AB, 4TH GENERATION,W RFLX CONFIRM     Standing Status:   Future     Standing Expiration Date:   9/5/2020    OCCULT BLOOD IMMUNOASSAY,DIAGNOSTIC     Standing Status:   Future     Standing Expiration Date:   9/5/2020    Omeprazole delayed release (PRILOSEC D/R) 20 mg tablet     Sig: Take 1 Tab by mouth daily. Dispense:  30 Tab     Refill:  0       Results  Results for orders placed or performed during the hospital encounter of 09/07/18   [facility] SPECIMEN COL   Result Value Ref Range    [facility]. Specimens collected/sent to [facility]. Please direct inquiries to [phone]). Assessment and Plan  Diagnoses and all orders for this visit:    1. Gastroesophageal reflux disease, esophagitis presence not specified  -     Omeprazole delayed release (PRILOSEC D/R) 20 mg tablet; Take 1 Tab by mouth daily.  -     METABOLIC PANEL, COMPREHENSIVE; Future  -     LIPID PANEL; Future  -     CBC WITH AUTOMATED DIFF; Future  -     TSH 3RD GENERATION; Future  -     HEMOGLOBIN A1C WITH EAG; Future  -     VITAMIN D, 25 HYDROXY; Future    2. Colon cancer screening  -     OCCULT BLOOD IMMUNOASSAY,DIAGNOSTIC; Future    3. Preventive measure  -     Omeprazole delayed release (PRILOSEC D/R) 20 mg tablet; Take 1 Tab by mouth daily.  -     METABOLIC PANEL, COMPREHENSIVE; Future  -     LIPID PANEL; Future  -     CBC WITH AUTOMATED DIFF;  Future  -     TSH 3RD GENERATION; Future  -     HEMOGLOBIN A1C WITH EAG; Future  -     VITAMIN D, 25 HYDROXY; Future  -     HIV 1/2 AG/AB, 4TH GENERATION,W RFLX CONFIRM; Future        After care summary printed and reviewed with patient. Plan reviewed with patient. Questions answered. Patient verbalized understanding of plan and is in agreement with plan. Patient to follow up in one week or earlier if symptoms worsen.      Izabella Johns, VIMALC

## 2019-09-16 ENCOUNTER — OFFICE VISIT (OUTPATIENT)
Dept: NEUROLOGY | Age: 54
End: 2019-09-16

## 2019-09-16 VITALS
RESPIRATION RATE: 16 BRPM | DIASTOLIC BLOOD PRESSURE: 80 MMHG | HEIGHT: 62 IN | OXYGEN SATURATION: 98 % | TEMPERATURE: 98.6 F | HEART RATE: 65 BPM | BODY MASS INDEX: 46.93 KG/M2 | SYSTOLIC BLOOD PRESSURE: 100 MMHG | WEIGHT: 255 LBS

## 2019-09-16 DIAGNOSIS — E66.01 MORBID OBESITY (HCC): ICD-10-CM

## 2019-09-16 DIAGNOSIS — G47.33 OSA (OBSTRUCTIVE SLEEP APNEA): Primary | ICD-10-CM

## 2019-09-16 NOTE — PROGRESS NOTES
9/16/2019 3:45 PM    SSN: [id number]       Subjective:   51-year-old female for follow-up of chief complaint of history of obstructive sleep apnea. Last visit here was in March. Lately she has had issues with reflux, on omeprazole, also had a cold, so has had some nights she has not used it. She has a SoClean machine. She estimates she may have missed 2-3 nights in the last 1-2 months because of these issues, but otherwise she is compliant, feels good with it. She had problems with supplies in the past which have now been addressed. Her last download was reviewed the last time here, through February 18 showed 90/90 days of use with average use of 5 hours and 17 minutes, AHI of 2.8, median pressure of 14.6 cm of H2O with max of 19 and median leak of 0.1 L/min. She is on auto CPAP with a minimum pressure of 7 and a maximum of 20.      Social History     Socioeconomic History    Marital status:      Spouse name: Not on file    Number of children: Not on file    Years of education: Not on file    Highest education level: Not on file   Occupational History    Not on file   Social Needs    Financial resource strain: Not on file    Food insecurity:     Worry: Not on file     Inability: Not on file    Transportation needs:     Medical: Not on file     Non-medical: Not on file   Tobacco Use    Smoking status: Never Smoker    Smokeless tobacco: Never Used   Substance and Sexual Activity    Alcohol use: No     Alcohol/week: 0.0 standard drinks    Drug use: No    Sexual activity: Not on file   Lifestyle    Physical activity:     Days per week: Not on file     Minutes per session: Not on file    Stress: Not on file   Relationships    Social connections:     Talks on phone: Not on file     Gets together: Not on file     Attends Taoist service: Not on file     Active member of club or organization: Not on file     Attends meetings of clubs or organizations: Not on file Relationship status: Not on file    Intimate partner violence:     Fear of current or ex partner: Not on file     Emotionally abused: Not on file     Physically abused: Not on file     Forced sexual activity: Not on file   Other Topics Concern    Not on file   Social History Narrative    Not on file       Family History   Problem Relation Age of Onset    Hypertension Mother     Glaucoma Mother     Hypertension Father     Stroke Brother     Heart Disease Maternal Aunt         chf    Heart Failure Maternal Aunt         MI    Diabetes Maternal Uncle     Heart Failure Other         MI    Stroke Other     Colon Cancer Other        Current Outpatient Medications   Medication Sig Dispense Refill    Omeprazole delayed release (PRILOSEC D/R) 20 mg tablet Take 1 Tab by mouth daily. 30 Tab 0    citalopram (CELEXA) 10 mg tablet TAKE 1/2 TABLET BY MOUTH ONCE DAILY 45 Tab 3    losartan (COZAAR) 100 mg tablet TAKE 1 TABLET BY MOUTH EVERY DAY 90 Tab 1    pravastatin (PRAVACHOL) 40 mg tablet TAKE 1 TABLET BY MOUTH NIGHTLY. 90 Tab 3    Cholecalciferol, Vitamin D3, (VITAMIN D3) 2,000 unit cap 1 cap daily   30 Cap prn    CALCIUM CITRATE/VITAMIN D3 (CITRACAL + D PO) Take  by mouth daily.  MULTIVITAMINS (MULTIVITAMIN PO) Take 1 Tab by mouth daily.       predniSONE (STERAPRED DS) 10 mg dose pack See administration instruction per 10mg dose pack 21 Tab 0       Past Medical History:   Diagnosis Date    Allergy     Anxiety 4/24/2010    Essential hypertension     HTN (hypertension) 4/24/2010    Hyperlipemia 4/24/2010    Hypokalemia 4/24/2010    Scoliosis        Past Surgical History:   Procedure Laterality Date    HX APPENDECTOMY  1979    HX HYSTERECTOMY  05/07/08    partial       Allergies   Allergen Reactions    Hydrochlorothiazide Other (comments)     TINGLING IN BACK OF HEAD    Norvasc [Amlodipine] Other (comments)     Tingling in back of head       Vital signs:    Visit Vitals  /80 (BP 1 Location: Left arm, BP Patient Position: Sitting)   Pulse 65   Temp 98.6 °F (37 °C) (Oral)   Resp 16   Ht 5' 2\" (1.575 m)   Wt 115.7 kg (255 lb)   LMP  (LMP Unknown)   SpO2 98%   BMI 46.64 kg/m²       Review of Systems:   GENERAL: Denies fever or fatigue  CARDIAC: No CP or SOB  PULMONARY: No cough of SOB  MUSCULOSKELETAL: No new joint pain  NEURO: SEE HPI      EXAM: Alert, in NAD. Heart is regular. Oriented x3, EOM's are full, PERRL, no facial asymmetries. Strength and tone are normal. DTR's +2, gait symmetric           Assessment/Plan: Obstructive sleep apnea, well treated on CPAP with great compliance in the past, which she suggest is that she has missed a couple of nights in the last 1 to 2 months which is not too bad. I will obtain download for my review. She is complaining of lately having gas, it would be unusual for CPAP to do this at this stage, but will review her download numbers and consider adjustments if things are off. Otherwise she will return in 3 months with another download. PLEASE NOTE:   Portions of this document may have been produced using voice recognition software. Unrecognized errors in transcription may be present. This note will not be viewable in [address]

## 2019-09-27 DIAGNOSIS — Z29.9 PREVENTIVE MEASURE: ICD-10-CM

## 2019-09-27 DIAGNOSIS — K21.9 GASTROESOPHAGEAL REFLUX DISEASE, ESOPHAGITIS PRESENCE NOT SPECIFIED: ICD-10-CM

## 2019-09-28 RX ORDER — OMEPRAZOLE 20 MG/1
CAPSULE, DELAYED RELEASE ORAL
Qty: 30 CAP | Refills: 0 | Status: SHIPPED | OUTPATIENT
Start: 2019-09-28 | End: 2019-10-07 | Stop reason: SDUPTHER

## 2019-10-03 ENCOUNTER — HOSPITAL ENCOUNTER (OUTPATIENT)
Dept: LAB | Age: 54
Discharge: HOME | End: 2019-10-03

## 2019-10-03 LAB — XX-LABCORP SPECIMEN COL,LCBCF: NORMAL

## 2019-10-03 PROCEDURE — 99001 SPECIMEN HANDLING PT-LAB: CPT

## 2019-10-04 ENCOUNTER — HOSPITAL ENCOUNTER (OUTPATIENT)
Dept: LAB | Age: 54
Discharge: HOME | End: 2019-10-04
Payer: COMMERCIAL

## 2019-10-04 DIAGNOSIS — Z12.11 COLON CANCER SCREENING: ICD-10-CM

## 2019-10-04 LAB
25(OH)D3+25(OH)D2 SERPL-MCNC: 32 NG/ML (ref 30–100)
ALBUMIN SERPL-MCNC: 4.2 G/DL (ref 3.5–5.5)
ALBUMIN/GLOB SERPL: 1.6 {RATIO} (ref 1.2–2.2)
ALP SERPL-CCNC: 103 IU/L (ref 39–117)
ALT SERPL-CCNC: 11 IU/L (ref 0–32)
AST SERPL-CCNC: 13 IU/L (ref 0–40)
BASOPHILS # BLD AUTO: 0 X10E3/UL (ref 0–0.2)
BASOPHILS NFR BLD AUTO: 0 %
BILIRUB SERPL-MCNC: 0.2 MG/DL (ref 0–1.2)
BUN SERPL-MCNC: 12 MG/DL (ref 6–24)
BUN/CREAT SERPL: 17 (ref 9–23)
CALCIUM SERPL-MCNC: 9.3 MG/DL (ref 8.7–10.2)
CHLORIDE SERPL-SCNC: 100 MMOL/L (ref 96–106)
CHOLEST SERPL-MCNC: 160 MG/DL (ref 100–199)
CO2 SERPL-SCNC: 23 MMOL/L (ref 20–29)
CREAT SERPL-MCNC: 0.72 MG/DL (ref 0.57–1)
EOSINOPHIL # BLD AUTO: 0.2 X10E3/UL (ref 0–0.4)
EOSINOPHIL NFR BLD AUTO: 3 %
ERYTHROCYTE [DISTWIDTH] IN BLOOD BY AUTOMATED COUNT: 13.8 % (ref 12.3–15.4)
EST. AVERAGE GLUCOSE BLD GHB EST-MCNC: 131 MG/DL
GLOBULIN SER CALC-MCNC: 2.7 G/DL (ref 1.5–4.5)
GLUCOSE SERPL-MCNC: 81 MG/DL (ref 65–99)
HBA1C MFR BLD: 6.2 % (ref 4.8–5.6)
HCT VFR BLD AUTO: 37.1 % (ref 34–46.6)
HDLC SERPL-MCNC: 50 MG/DL
HGB BLD-MCNC: 11.8 G/DL (ref 11.1–15.9)
HIV 1+2 AB+HIV1 P24 AG SERPL QL IA: NON REACTIVE
IMM GRANULOCYTES # BLD AUTO: 0 X10E3/UL (ref 0–0.1)
IMM GRANULOCYTES NFR BLD AUTO: 0 %
INTERPRETATION, 910389: NORMAL
LDLC SERPL CALC-MCNC: 94 MG/DL (ref 0–99)
LYMPHOCYTES # BLD AUTO: 3 X10E3/UL (ref 0.7–3.1)
LYMPHOCYTES NFR BLD AUTO: 49 %
MCH RBC QN AUTO: 25.3 PG (ref 26.6–33)
MCHC RBC AUTO-ENTMCNC: 31.8 G/DL (ref 31.5–35.7)
MCV RBC AUTO: 80 FL (ref 79–97)
MONOCYTES # BLD AUTO: 0.5 X10E3/UL (ref 0.1–0.9)
MONOCYTES NFR BLD AUTO: 8 %
NEUTROPHILS # BLD AUTO: 2.5 X10E3/UL (ref 1.4–7)
NEUTROPHILS NFR BLD AUTO: 40 %
PLATELET # BLD AUTO: 292 X10E3/UL (ref 150–450)
POTASSIUM SERPL-SCNC: 4.3 MMOL/L (ref 3.5–5.2)
PROT SERPL-MCNC: 6.9 G/DL (ref 6–8.5)
RBC # BLD AUTO: 4.66 X10E6/UL (ref 3.77–5.28)
SODIUM SERPL-SCNC: 139 MMOL/L (ref 134–144)
TRIGL SERPL-MCNC: 81 MG/DL (ref 0–149)
TSH SERPL DL<=0.005 MIU/L-ACNC: 1.62 UIU/ML (ref 0.45–4.5)
VLDLC SERPL CALC-MCNC: 16 MG/DL (ref 5–40)
WBC # BLD AUTO: 6.2 X10E3/UL (ref 3.4–10.8)

## 2019-10-04 PROCEDURE — 82274 ASSAY TEST FOR BLOOD FECAL: CPT

## 2019-10-07 ENCOUNTER — OFFICE VISIT (OUTPATIENT)
Dept: FAMILY MEDICINE CLINIC | Age: 54
End: 2019-10-07

## 2019-10-07 VITALS
RESPIRATION RATE: 20 BRPM | WEIGHT: 257 LBS | SYSTOLIC BLOOD PRESSURE: 129 MMHG | HEART RATE: 64 BPM | OXYGEN SATURATION: 98 % | BODY MASS INDEX: 47.29 KG/M2 | HEIGHT: 62 IN | DIASTOLIC BLOOD PRESSURE: 81 MMHG | TEMPERATURE: 98.1 F

## 2019-10-07 DIAGNOSIS — Z29.9 PREVENTIVE MEASURE: ICD-10-CM

## 2019-10-07 DIAGNOSIS — Z71.2 ENCOUNTER TO DISCUSS TEST RESULTS: ICD-10-CM

## 2019-10-07 DIAGNOSIS — R73.03 PREDIABETES: ICD-10-CM

## 2019-10-07 DIAGNOSIS — I10 ESSENTIAL HYPERTENSION: Primary | ICD-10-CM

## 2019-10-07 DIAGNOSIS — R00.2 PALPITATION: ICD-10-CM

## 2019-10-07 DIAGNOSIS — K21.9 GASTROESOPHAGEAL REFLUX DISEASE, ESOPHAGITIS PRESENCE NOT SPECIFIED: ICD-10-CM

## 2019-10-07 DIAGNOSIS — E78.5 HYPERLIPIDEMIA, UNSPECIFIED HYPERLIPIDEMIA TYPE: ICD-10-CM

## 2019-10-07 RX ORDER — OMEPRAZOLE 20 MG/1
CAPSULE, DELAYED RELEASE ORAL
Qty: 30 CAP | Refills: 1 | Status: SHIPPED | OUTPATIENT
Start: 2019-10-07 | End: 2019-10-14

## 2019-10-07 NOTE — PATIENT INSTRUCTIONS
Body Mass Index: Care Instructions Your Care Instructions Body mass index (BMI) can help you see if your weight is raising your risk for health problems. It uses a formula to compare how much you weigh with how tall you are. · A BMI lower than 18.5 is considered underweight. · A BMI between 18.5 and 24.9 is considered healthy. · A BMI between 25 and 29.9 is considered overweight. A BMI of 30 or higher is considered obese. If your BMI is in the normal range, it means that you have a lower risk for weight-related health problems. If your BMI is in the overweight or obese range, you may be at increased risk for weight-related health problems, such as high blood pressure, heart disease, stroke, arthritis or joint pain, and diabetes. If your BMI is in the underweight range, you may be at increased risk for health problems such as fatigue, lower protection (immunity) against illness, muscle loss, bone loss, hair loss, and hormone problems. BMI is just one measure of your risk for weight-related health problems. You may be at higher risk for health problems if you are not active, you eat an unhealthy diet, or you drink too much alcohol or use tobacco products. Follow-up care is a key part of your treatment and safety. Be sure to make and go to all appointments, and call your doctor if you are having problems. It's also a good idea to know your test results and keep a list of the medicines you take. How can you care for yourself at home? · Practice healthy eating habits. This includes eating plenty of fruits, vegetables, whole grains, lean protein, and low-fat dairy. · If your doctor recommends it, get more exercise. Walking is a good choice. Bit by bit, increase the amount you walk every day. Try for at least 30 minutes on most days of the week. · Do not smoke. Smoking can increase your risk for health problems.  If you need help quitting, talk to your doctor about stop-smoking programs and medicines. These can increase your chances of quitting for good. · Limit alcohol to 2 drinks a day for men and 1 drink a day for women. Too much alcohol can cause health problems. If you have a BMI higher than 25 · Your doctor may do other tests to check your risk for weight-related health problems. This may include measuring the distance around your waist. A waist measurement of more than 40 inches in men or 35 inches in women can increase the risk of weight-related health problems. · Talk with your doctor about steps you can take to stay healthy or improve your health. You may need to make lifestyle changes to lose weight and stay healthy, such as changing your diet and getting regular exercise. If you have a BMI lower than 18.5 · Your doctor may do other tests to check your risk for health problems. · Talk with your doctor about steps you can take to stay healthy or improve your health. You may need to make lifestyle changes to gain or maintain weight and stay healthy, such as getting more healthy foods in your diet and doing exercises to build muscle. Where can you learn more? Go to http://flo-hari.info/. Enter S176 in the search box to learn more about \"Body Mass Index: Care Instructions. \" Current as of: October 13, 2016 Content Version: 11.4 © 6555-0869 [employer]. Care instructions adapted under license by [employer] (which disclaims liability or warranty for this information). If you have questions about a medical condition or this instruction, always ask your healthcare professional. [place] [address] any warranty or liability for your use of this information. Emtricitabine/Tenofovir (Truvada) - (By mouth) Why this medicine is used:  
Treats HIV infection. Also used to reduce the risk of HIV infection. This medicine does not cure HIV. Contact a nurse or doctor right away if you have: · Fast breathing, trouble breathing, nausea and vomiting, lightheadedness, severe weakness, tiredness, confusion · Dark urine or pale stools, loss of appetite, stomach pain, yellow skin or eyes · Change in how much or how often you urinate, cloudy urine · Bone pain Common side effects: 
· Cough, stuffy or runny nose, sore throat · Feeling depressed, trouble sleeping, unusual dreams · Headache, dizziness · Mild nausea, diarrhea © 2017 [facility] Information is for End User's use only and may not be sold, redistributed or otherwise used for commercial purposes. Learning About Taking Medicine to Prevent HIV Infections Introduction HIV (human immunodeficiency virus) is a virus that attacks the immune system, the body's natural defense system. Without a strong immune system, the body has trouble fighting off disease. HIV is the virus that causes AIDS. Two common ways to get HIV are: 
· Having unprotected sex with someone who has HIV. · Sharing needles with someone who has HIV. These things put you at high risk for getting HIV. If you are at risk, you and your doctor can decide if you can take medicines that may lower your risk. Taking these medicines is called pre-exposure prophylaxis (PrEP). How does PrEP work? PrEP can help prevent an HIV infection from taking hold and spreading in your body. Two medicines are combined in one pill called Truvada. You must take it on schedule for it to help protect you from HIV. PrEP works best if you take the medicine every day. It doesn't work well if you don't follow the daily schedule. Do not share your medicine with other people. You will have regular visits with your doctor. He or she will check to see how you are doing while taking the medicine. You'll be tested for HIV. Your doctor may also talk to you about other steps you can take to avoid HIV infection.  These include practicing safer sex and not injecting illegal drugs with shared needles. What else should you know about PrEP? PrEP does not remove all risk of getting HIV. While you take the medicine, avoid risky actions like having unprotected sex and sharing needles. PrEP can help you have a baby safely when your partner has an HIV infection. It can help prevent the infection from spreading to you or your baby. Your doctor can discuss this and other options with you. If you are infected with HIV, your doctor may give you Truvada along with other medicine to treat HIV. Be safe with medicines. Take your medicines exactly as prescribed. Call your doctor if you think you are having a problem with your medicine. You may be able to pay less for PrEP medicines. Many health insurance plans cover the cost of PrEP. There are programs that provide PrEP for free or at a lower cost for people who need help paying for it. Follow-up care is a key part of your treatment and safety. Be sure to make and go to all appointments, and call your doctor if you are having problems. It's also a good idea to know your test results and keep a list of the medicines you take. Where can you learn more? Go to http://floRadical Studioshari.info/. Enter N322 in the search box to learn more about \"Learning About Taking Medicine to Prevent HIV Infections. \" Current as of: June 9, 2019 Content Version: 12.2 © 8234-3428 [employer]. Care instructions adapted under license by [employer] (which disclaims liability or warranty for this information). If you have questions about a medical condition or this instruction, always ask your healthcare professional. [place] [address] any warranty or liability for your use of this information. Emtricitabine/Tenofovir (By mouth) Emtricitabine (fh-keik-IEI-ta-been), Tenofovir Disoproxil Fumarate (ten-OF-oh-vir dye-vibha-PROX-il FUE-ma-rate) Treats HIV infection. HIV causes AIDS. This medicine does not cure HIV or AIDS, but combinations of drugs may slow the progress of the disease. Brand Name(s): Truvada There may be other brand names for this medicine. When This Medicine Should Not Be Used: This medicine is not right for everyone. Do not use it if you had an allergic reaction to emtricitabine or tenofovir. How to Use This Medicine:  
Tablet · Your doctor will tell you how much medicine to use. Do not use more than directed. · This medicine is used with other medicines to treat HIV infection. Take all other medicines your doctor has prescribed as part of your combination treatment. · Do not stop using this medicine without checking first with your doctor. If you stop the medicine even briefly, the virus may become harder to treat. Contact your doctor or pharmacist when your supply is running low so you do not run out. · This medicine should come with a Medication Guide. Ask your pharmacist for a copy if you do not have one. · Missed dose: Take a dose as soon as you remember. If it is almost time for your next dose, wait until then and take a regular dose. Do not take extra medicine to make up for a missed dose. · Store the medicine in a closed container at room temperature, away from heat, moisture, and direct light. Keep the medicine in the original bottle. Drugs and Foods to Avoid: Ask your doctor or pharmacist before using any other medicine, including over-the-counter medicines, vitamins, and herbal products. · Do not use this medicine together with adefovir or any other medicine that contains emtricitabine, lamivudine, or tenofovir. · Some medicines can affect how emtricitabine and tenofovir combination works. Tell your doctor if you are using: ¨ Acyclovir, atazanavir, cidofovir, darunavir/ritonavir, didanosine, ganciclovir, gentamicin, ledipasvir/sofosbuvir, lopinavir/ritonavir, sofosbuvir/velpatasvir, valacyclovir, valganciclovir ¨ an NSAID pain or arthritis medicine (including aspirin, ibuprofen, naproxen) Warnings While Using This Medicine: · Tell your doctor if you are pregnant, or if you have kidney problems, liver disease (including hepatitis B), or bone problems (including osteoporosis). · Do not breastfeed. You can spread HIV or AIDS to your baby through your breast milk. · This medicine may cause the following problems: 
¨ Lactic acidosis (too much acid in your blood) ¨ Severe liver problems ¨ Worsening of hepatitis B infection ¨ Kidney problems ¨ Lower bone mineral density · Your immune system may get stronger when you start taking HIV medicines. This could cause a hidden infection in your body to become active. Tell your doctor right away if you notice any changes in your health. · This medicine will not keep you from giving HIV to others. Always practice safe sex, even if your partner also has HIV. Do not share needles or other items that may have blood or body fluids on them. · Your doctor will do lab tests at regular visits to check on the effects of this medicine. Keep all appointments. · Keep all medicine out of the reach of children. Never share your medicine with anyone. Possible Side Effects While Using This Medicine:  
Call your doctor right away if you notice any of these side effects: · Allergic reaction: Itching or hives, swelling in your face or hands, swelling or tingling in your mouth or throat, chest tightness, trouble breathing · Bone pain · Change in how much or how often you urinate, cloudy urine · Dark urine or pale stools, loss of appetite, stomach pain, yellow skin or eyes · Fast breathing, trouble breathing, nausea and vomiting, lightheadedness, severe weakness, tiredness, confusion If you notice these less serious side effects, talk with your doctor: · Cough, runny or stuffy nose, sore throat · Feeling depressed, trouble sleeping, unusual dreams · Headache, dizziness · Mild nausea, diarrhea If you notice other side effects that you think are caused by this medicine, tell your doctor. Call your doctor for medical advice about side effects. You may report side effects to FDA at 1-814-UFT-5972 © 2017 [facility] Information is for End User's use only and may not be sold, redistributed or otherwise used for commercial purposes. The above information is an  only. It is not intended as medical advice for individual conditions or treatments. Talk to your doctor, nurse or pharmacist before following any medical regimen to see if it is safe and effective for you.

## 2019-10-07 NOTE — PROGRESS NOTES
[facility] 47 [facility]  Chief Complaint   Patient presents with    Hypertension     States f/u related  BP    Results     labs completed   Patient reports she is taking her blood pressure medication as prescribed. She denies swelling in her lower extremities. She reports she has been for her labs. Reports she is getting some palpitations intermittently at night. She reports she has gone to the ER twice for this. ER work-up was negative. However patient continues to complain of palpitations. She denies chest pain and or shortness of breath on today's visit. She denies palpitations on today's visit. She denies dizziness or blurred vision. She also denies any loss of consciousness or weakness    Past Medical History  Past Medical History:   Diagnosis Date    Allergy     Anxiety 4/24/2010    Essential hypertension     HTN (hypertension) 4/24/2010    Hyperlipemia 4/24/2010    Hypokalemia 4/24/2010    Scoliosis        Surgical History  Past Surgical History:   Procedure Laterality Date    HX APPENDECTOMY  1979    HX HYSTERECTOMY  05/07/08    partial        Medications  Current Outpatient Medications   Medication Sig Dispense Refill    omeprazole (PRILOSEC) 20 mg capsule TAKE 1 TABLET BY MOUTH EVERY DAY 30 Cap 0    citalopram (CELEXA) 10 mg tablet TAKE 1/2 TABLET BY MOUTH ONCE DAILY 45 Tab 3    losartan (COZAAR) 100 mg tablet TAKE 1 TABLET BY MOUTH EVERY DAY 90 Tab 1    pravastatin (PRAVACHOL) 40 mg tablet TAKE 1 TABLET BY MOUTH NIGHTLY. 90 Tab 3    Cholecalciferol, Vitamin D3, (VITAMIN D3) 2,000 unit cap 1 cap daily   30 Cap prn    CALCIUM CITRATE/VITAMIN D3 (CITRACAL + D PO) Take  by mouth daily.  MULTIVITAMINS (MULTIVITAMIN PO) Take 1 Tab by mouth daily.       predniSONE (STERAPRED DS) 10 mg dose pack See administration instruction per 10mg dose pack 21 Tab 0       Allergies  Allergies   Allergen Reactions    Hydrochlorothiazide Other (comments)     TINGLING IN BACK OF HEAD    Norvasc [Amlodipine] Other (comments)     Tingling in back of head       Family History  Family History   Problem Relation Age of Onset    Hypertension Mother     Glaucoma Mother     Hypertension Father     Stroke Brother     Heart Disease Maternal Aunt         chf    Heart Failure Maternal Aunt         MI    Diabetes Maternal Uncle     Heart Failure Other         MI    Stroke Other     Colon Cancer Other        Social History  Social History     Socioeconomic History    Marital status:      Spouse name: Not on file    Number of children: Not on file    Years of education: Not on file    Highest education level: Not on file   Occupational History    Not on file   Social Needs    Financial resource strain: Not on file    Food insecurity:     Worry: Not on file     Inability: Not on file    Transportation needs:     Medical: Not on file     Non-medical: Not on file   Tobacco Use    Smoking status: Never Smoker    Smokeless tobacco: Never Used   Substance and Sexual Activity    Alcohol use: No     Alcohol/week: 0.0 standard drinks    Drug use: No    Sexual activity: Not on file   Lifestyle    Physical activity:     Days per week: Not on file     Minutes per session: Not on file    Stress: Not on file   Relationships    Social connections:     Talks on phone: Not on file     Gets together: Not on file     Attends Sabianist service: Not on file     Active member of club or organization: Not on file     Attends meetings of clubs or organizations: Not on file     Relationship status: Not on file    Intimate partner violence:     Fear of current or ex partner: Not on file     Emotionally abused: Not on file     Physically abused: Not on file     Forced sexual activity: Not on file   Other Topics Concern    Not on file   Social History Narrative    Not on file       Problem List  Patient Active Problem List   Diagnosis Code    HTN (hypertension) I10    Hyperlipidemia E78.5  Anxiety F41.9    Hypokalemia E87.6    Tinnitus of both ears H93.13    HIV exposure Z20.6    Diverticula of colon K57.30    RUQ abdominal pain R10.11    Vitamin D deficiency E55.9    Hyperlipidemia E78.5    Snoring R06.83    SEFERINO on CPAP G47.33, Z99.89    Prediabetes R73.03    Obesity, morbid (HCC) E66.01       Review of Systems  Review of Systems   Constitutional: Negative for chills and fever. Eyes: Negative for blurred vision. Respiratory: Negative for shortness of breath. Cardiovascular: Positive for palpitations. Negative for chest pain and leg swelling. Gastrointestinal: Negative for abdominal pain, nausea and vomiting. Neurological: Negative for dizziness, tingling, loss of consciousness and weakness. Vital Signs  Vitals:    10/07/19 1517   BP: 129/81   Pulse: 64   Resp: 20   Temp: 98.1 °F (36.7 °C)   TempSrc: Oral   SpO2: 98%   Weight: 257 lb (116.6 kg)   Height: 5' 2\" (1.575 m)   PainSc:   0 - No pain       Physical Exam  Physical Exam   Constitutional: She is oriented to person, place, and time. HENT:   Mouth/Throat: Oropharynx is clear and moist.   Eyes: Pupils are equal, round, and reactive to light. Cardiovascular: Normal rate, regular rhythm and normal heart sounds. Pulmonary/Chest: Effort normal and breath sounds normal. No respiratory distress. Abdominal: Soft. Bowel sounds are normal.   Neurological: She is alert and oriented to person, place, and time. Skin: Skin is warm and dry. Psychiatric: She has a normal mood and affect. Her behavior is normal.       Diagnostics  Orders Placed This Encounter    SHER Mccullough Marcelino ref [address]     Referral Priority:   Routine     Referral Type:   Consultation     Referral Reason:   Specialty Services Required     Referred to Provider:   Wanda Bajwa MD     Number of Visits Requested:   1       Results  Results for orders placed or performed during the hospital encounter of 10/03/19   [facility] SPECIMEN COL   Result Value Ref Range    [facility]. Specimens collected/sent to [facility]. Please direct inquiries to ([phone]). Assessment and Plan  Diagnoses and all orders for this visit:    1. Essential hypertension    2. Hyperlipidemia, unspecified hyperlipidemia type    3. Prediabetes    4. BMI 45.0-49.9, adult ([facility] [address]    5. Encounter to discuss test results    6. Palpitation  -     REFERRAL TO CARDIOLOGY      Lab results discussed with patient. Patient  is HIV positive. Discuss Prep with patient and offered Truvada. Patient requesting additional information on this medication would like to think about this. Information given to patient on Prep/ Truvada. Discussed diet and exercise with patient. Patient to continue to take blood pressure medications as prescribed. She is aware of cardiac alarm symptoms and when to seek urgent/emergent care. After care summary printed and reviewed with patient. Plan reviewed with patient. Questions answered. Patient verbalized understanding of plan and is in agreement with plan. Patient to follow up in 6 months or earlier if symptoms worsen. Follow-up and Dispositions    · Return in about 6 months (around 4/7/2020), or if symptoms worsen or fail to improve. MARIO Paula  Discussed the patient's BMI with her. The BMI follow up plan is as follows:     dietary management education, guidance, and counseling  encourage exercise  monitor weight  prescribed dietary intake    An After Visit Summary was printed and given to the patient.     MARIO Crouch

## 2019-10-07 NOTE — PROGRESS NOTES
Lionel Hutton presents today for   Chief Complaint   Patient presents with    Hypertension     States f/u related  BP    Results     labs completed       Is someone accompanying this pt? No    Is the patient using any DME equipment during OV? No    Depression Screening:  3 most recent PHQ Screens 3/13/2019   PHQ Not Done -   Little interest or pleasure in doing things Not at all   Feeling down, depressed, irritable, or hopeless Not at all   Total Score PHQ 2 0       Learning Assessment:  Learning Assessment 9/11/2018   PRIMARY LEARNER Patient   HIGHEST LEVEL OF EDUCATION - PRIMARY LEARNER  GRADUATED HIGH SCHOOL OR GED   BARRIERS PRIMARY LEARNER NONE   CO-LEARNER CAREGIVER No   PRIMARY LANGUAGE ENGLISH    NEED No   LEARNER PREFERENCE PRIMARY DEMONSTRATION   ANSWERED BY patient   RELATIONSHIP SELF       Abuse Screening:  Abuse Screening Questionnaire 9/11/2018   Do you ever feel afraid of your partner? N   Are you in a relationship with someone who physically or mentally threatens you? N   Is it safe for you to go home? Y         Health Maintenance Due   Topic Date Due    Shingrix Vaccine Age 50> (1 of 2) 01/22/2015    FOBT Q 1 YEAR AGE 50-75  01/22/2015    Influenza Age 5 to Adult  08/01/2019   . Health Maintenance reviewed and discussed and ordered per Provider. Coordination of Care  1. Have you been to the ER, urgent care clinic since your last visit? Hospitalized since your last visit? No    2. Have you seen or consulted any other health care providers outside of the [address] [place] [address] since your last visit? Include any pap smears or colon screening. No        Advance Directive:  1. Do you have an advance directive in place? Patient Reply:No    2. If not, would you like material regarding how to put one in place?  Patient Reply: No

## 2019-10-14 ENCOUNTER — OFFICE VISIT (OUTPATIENT)
Dept: CARDIOLOGY CLINIC | Age: 54
End: 2019-10-14

## 2019-10-14 VITALS
BODY MASS INDEX: 46.12 KG/M2 | HEIGHT: 62 IN | DIASTOLIC BLOOD PRESSURE: 76 MMHG | WEIGHT: 250.6 LBS | HEART RATE: 64 BPM | SYSTOLIC BLOOD PRESSURE: 143 MMHG

## 2019-10-14 DIAGNOSIS — G47.33 OSA (OBSTRUCTIVE SLEEP APNEA): ICD-10-CM

## 2019-10-14 DIAGNOSIS — I10 ESSENTIAL HYPERTENSION: ICD-10-CM

## 2019-10-14 DIAGNOSIS — R00.2 PALPITATIONS: Primary | ICD-10-CM

## 2019-10-14 DIAGNOSIS — E78.5 HYPERLIPIDEMIA, UNSPECIFIED HYPERLIPIDEMIA TYPE: ICD-10-CM

## 2019-10-14 NOTE — PROGRESS NOTES
HISTORY OF PRESENT ILLNESS  Elina Moody is a 47 y.o. female. Patient is seen today for new patient evaluation. She is referred here for palpitation. She has a history of hypertension, hyperlipidemia. New Patient   The history is provided by the patient. This is a new problem. Episode onset: 2 months. Pertinent negatives include no chest pain, no abdominal pain, no headaches and no shortness of breath. Palpitations    The history is provided by the patient. This is a new problem. Episode onset: 2 months. The problem has been gradually worsening. The problem occurs every several days. The problem is associated with nothing. Pertinent negatives include no fever, no chest pain, no claudication, no orthopnea, no PND, no abdominal pain, no nausea, no vomiting, no headaches, no dizziness, no weakness, no cough, no hemoptysis, no shortness of breath and no sputum production. Review of Systems   Constitutional: Negative for chills and fever. HENT: Negative for nosebleeds. Eyes: Negative for blurred vision and double vision. Respiratory: Negative for cough, hemoptysis, sputum production, shortness of breath and wheezing. Cardiovascular: Positive for palpitations. Negative for chest pain, orthopnea, claudication, leg swelling and PND. Gastrointestinal: Negative for abdominal pain, heartburn, nausea and vomiting. Musculoskeletal: Negative for myalgias. Skin: Negative for rash. Neurological: Negative for dizziness, weakness and headaches. Endo/Heme/Allergies: Does not bruise/bleed easily.      Family History   Problem Relation Age of Onset    Hypertension Mother     Glaucoma Mother     Hypertension Father     Stroke Brother     Heart Disease Maternal Aunt         chf    Heart Failure Maternal Aunt         MI    Diabetes Maternal Uncle     Heart Failure Other         MI    Stroke Other     Colon Cancer Other        Past Medical History:   Diagnosis Date    Allergy     Anxiety 4/24/2010    Essential hypertension     HTN (hypertension) 4/24/2010    Hyperlipemia 4/24/2010    Hypokalemia 4/24/2010    Scoliosis        Past Surgical History:   Procedure Laterality Date    HX APPENDECTOMY  1979    HX HYSTERECTOMY  05/07/08    partial       Social History     Tobacco Use    Smoking status: Never Smoker    Smokeless tobacco: Never Used   Substance Use Topics    Alcohol use: No     Alcohol/week: 0.0 standard drinks       Allergies   Allergen Reactions    Hydrochlorothiazide Other (comments)     TINGLING IN BACK OF HEAD    Norvasc [Amlodipine] Other (comments)     Tingling in back of head       Prior to Admission medications    Medication Sig Start Date End Date Taking? Authorizing Provider   citalopram (CELEXA) 10 mg tablet TAKE 1/2 TABLET BY MOUTH ONCE DAILY 6/23/19  Yes Ladarius GARDNER NP   losartan (COZAAR) 100 mg tablet TAKE 1 TABLET BY MOUTH EVERY DAY 6/23/19  Yes Ada Andujar NP   pravastatin (PRAVACHOL) 40 mg tablet TAKE 1 TABLET BY MOUTH NIGHTLY. 12/27/18  Yes Ladarius GARDNER NP   Cholecalciferol, Vitamin D3, (VITAMIN D3) 2,000 unit cap 1 cap daily   10/26/12  Yes Luzmaria Alegria MD   CALCIUM CITRATE/VITAMIN D3 (CITRACAL + D PO) Take  by mouth daily. 5/24/10  Yes Provider, Historical   MULTIVITAMINS (MULTIVITAMIN PO) Take 1 Tab by mouth daily. Yes Provider, Historical         Visit Vitals  /76   Pulse 64   Ht 5' 2\" (1.575 m)   Wt 113.7 kg (250 lb 9.6 oz)   LMP  (LMP Unknown)   BMI 45.84 kg/m²         Physical Exam   Constitutional: She is oriented to person, place, and time. She appears well-developed and well-nourished. HENT:   Head: Normocephalic and atraumatic. Eyes: Conjunctivae are normal.   Neck: Neck supple. No JVD present. No tracheal deviation present. No thyromegaly present. Cardiovascular: Normal rate and regular rhythm. PMI is not displaced. Exam reveals no gallop, no S3 and no decreased pulses. No murmur heard.   Pulmonary/Chest: No respiratory distress. She has no wheezes. She has no rales. She exhibits no tenderness. Abdominal: Soft. There is no tenderness. Musculoskeletal: She exhibits no edema. Neurological: She is alert and oriented to person, place, and time. Skin: Skin is warm. Psychiatric: She has a normal mood and affect. Ms. Tonia Richards has a reminder for a \"due or due soon\" health maintenance. I have asked that she contact her primary care provider for follow-up on this health maintenance. I have personally reviewed patient's records available from hospital and other providers and incorporated findings in patient care. Notes,lab,old ekg,vascular study  I Have personally reviewed recent relevant labs available and discussed with patient    Assessment         ICD-10-CM ICD-9-CM    1. Palpitations R00.2 785.1 AMB POC EKG ROUTINE W/ 12 LEADS, INTER & REP      ECHO ADULT COMPLETE      CARDIAC EVENT MONITOR    Worsening symptom of palpitation. Possible arrhythmia. Rule out SVT or A. fib   2. Essential hypertension I10 401.9     Stable continue current medical management   3. Hyperlipidemia, unspecified hyperlipidemia type E78.5 272.4     Continue statin. Recent left LDL less than 100   4. SEFERINO (obstructive sleep apnea) G47.33 327.23 ECHO ADULT COMPLETE    Compliant with CPAP. Continue treatment diet and weight loss       Medications Discontinued During This Encounter   Medication Reason    omeprazole (PRILOSEC) 20 mg capsule Not A Current Medication    predniSONE (STERAPRED DS) 10 mg dose pack Not A Current Medication       Orders Placed This Encounter    AMB POC EKG ROUTINE W/ 12 LEADS, INTER & REP     Order Specific Question:   Reason for Exam:     Answer:   Palpitations       Follow-up and Dispositions    · Return for F/u after tests.

## 2019-10-15 LAB — HEMOCCULT STL QL IA: NEGATIVE

## 2019-10-25 ENCOUNTER — HOSPITAL ENCOUNTER (EMERGENCY)
Age: 54
Discharge: HOME | End: 2019-10-25
Attending: EMERGENCY MEDICINE
Payer: COMMERCIAL

## 2019-10-25 VITALS
WEIGHT: 250 LBS | HEART RATE: 60 BPM | HEIGHT: 62 IN | DIASTOLIC BLOOD PRESSURE: 82 MMHG | TEMPERATURE: 98.7 F | OXYGEN SATURATION: 100 % | SYSTOLIC BLOOD PRESSURE: 153 MMHG | RESPIRATION RATE: 14 BRPM | BODY MASS INDEX: 46.01 KG/M2

## 2019-10-25 DIAGNOSIS — R00.2 PALPITATIONS: Primary | ICD-10-CM

## 2019-10-25 LAB
ALBUMIN SERPL-MCNC: 3.5 G/DL (ref 3.4–5)
ALBUMIN/GLOB SERPL: 0.9 {RATIO} (ref 0.8–1.7)
ALP SERPL-CCNC: 111 U/L (ref 45–117)
ALT SERPL-CCNC: 15 U/L (ref 13–56)
ANION GAP SERPL CALC-SCNC: 11 MMOL/L (ref 3–18)
AST SERPL-CCNC: 10 U/L (ref 10–38)
ATRIAL RATE: 75 BPM
BASOPHILS # BLD: 0 K/UL (ref 0–0.1)
BASOPHILS NFR BLD: 0 % (ref 0–2)
BILIRUB SERPL-MCNC: 0.2 MG/DL (ref 0.2–1)
BUN SERPL-MCNC: 11 MG/DL (ref 7–18)
BUN/CREAT SERPL: 16 (ref 12–20)
CALCIUM SERPL-MCNC: 8.8 MG/DL (ref 8.5–10.1)
CALCULATED P AXIS, ECG09: 47 DEGREES
CALCULATED R AXIS, ECG10: 11 DEGREES
CALCULATED T AXIS, ECG11: 40 DEGREES
CHLORIDE SERPL-SCNC: 104 MMOL/L (ref 100–111)
CO2 SERPL-SCNC: 26 MMOL/L (ref 21–32)
CREAT SERPL-MCNC: 0.7 MG/DL (ref 0.6–1.3)
D DIMER PPP FEU-MCNC: 0.3 UG/ML(FEU)
DIAGNOSIS, 93000: NORMAL
DIFFERENTIAL METHOD BLD: ABNORMAL
EOSINOPHIL # BLD: 0.1 K/UL (ref 0–0.4)
EOSINOPHIL NFR BLD: 2 % (ref 0–5)
ERYTHROCYTE [DISTWIDTH] IN BLOOD BY AUTOMATED COUNT: 14.4 % (ref 11.6–14.5)
GLOBULIN SER CALC-MCNC: 3.8 G/DL (ref 2–4)
GLUCOSE SERPL-MCNC: 107 MG/DL (ref 74–99)
HCT VFR BLD AUTO: 38.8 % (ref 35–45)
HGB BLD-MCNC: 11.8 G/DL (ref 12–16)
LYMPHOCYTES # BLD: 3.1 K/UL (ref 0.9–3.6)
LYMPHOCYTES NFR BLD: 53 % (ref 21–52)
MCH RBC QN AUTO: 25.2 PG (ref 24–34)
MCHC RBC AUTO-ENTMCNC: 30.4 G/DL (ref 31–37)
MCV RBC AUTO: 82.9 FL (ref 74–97)
MONOCYTES # BLD: 0.4 K/UL (ref 0.05–1.2)
MONOCYTES NFR BLD: 7 % (ref 3–10)
NEUTS SEG # BLD: 2.3 K/UL (ref 1.8–8)
NEUTS SEG NFR BLD: 38 % (ref 40–73)
P-R INTERVAL, ECG05: 166 MS
PLATELET # BLD AUTO: 287 K/UL (ref 135–420)
PMV BLD AUTO: 9.6 FL (ref 9.2–11.8)
POTASSIUM SERPL-SCNC: 4.2 MMOL/L (ref 3.5–5.5)
PROT SERPL-MCNC: 7.3 G/DL (ref 6.4–8.2)
Q-T INTERVAL, ECG07: 406 MS
QRS DURATION, ECG06: 76 MS
QTC CALCULATION (BEZET), ECG08: 453 MS
RBC # BLD AUTO: 4.68 M/UL (ref 4.2–5.3)
SODIUM SERPL-SCNC: 141 MMOL/L (ref 136–145)
TROPONIN I SERPL-MCNC: <0.02 NG/ML (ref 0–0.04)
VENTRICULAR RATE, ECG03: 75 BPM
WBC # BLD AUTO: 5.9 K/UL (ref 4.6–13.2)

## 2019-10-25 PROCEDURE — 80053 COMPREHEN METABOLIC PANEL: CPT

## 2019-10-25 PROCEDURE — 85027 COMPLETE CBC AUTOMATED: CPT

## 2019-10-25 PROCEDURE — 93005 ELECTROCARDIOGRAM TRACING: CPT

## 2019-10-25 PROCEDURE — 99284 EMERGENCY DEPT VISIT MOD MDM: CPT

## 2019-10-25 PROCEDURE — 84484 ASSAY OF TROPONIN QUANT: CPT

## 2019-10-25 PROCEDURE — 85379 FIBRIN DEGRADATION QUANT: CPT

## 2019-10-25 NOTE — ED TRIAGE NOTES
Heart palpitations since 0230 hrs this am.. Pt has a body guardian mini plus cardiac monitor from her Dr. With her. .. Farida abdul. César Cabezas

## 2019-10-25 NOTE — DISCHARGE INSTRUCTIONS

## 2019-10-25 NOTE — ED NOTES
I have reviewed discharge instructions with the patient. The patient verbalized understanding. Patient armband removed and shredded  IV catheter discontinued intact. Site without signs and symptoms of complications. Dressing and pressure applied.

## 2019-10-25 NOTE — ED PROVIDER NOTES
HPI patient is a 79-year-old female who has history of palpitation of unknown etiology. She currently is on a Holter monitor. Tonight she developed a sensation of palpitation 2 hours ago.   However upon arrival to the ER her pulse was normal.    Past Medical History:   Diagnosis Date    Allergy     Anxiety 4/24/2010    Essential hypertension     HTN (hypertension) 4/24/2010    Hyperlipemia 4/24/2010    Hypokalemia 4/24/2010    Scoliosis        Past Surgical History:   Procedure Laterality Date    HX APPENDECTOMY  1979    HX HYSTERECTOMY  05/07/08    partial         Family History:   Problem Relation Age of Onset    Hypertension Mother     Glaucoma Mother     Hypertension Father     Stroke Brother     Heart Disease Maternal Aunt         chf    Heart Failure Maternal Aunt         MI    Diabetes Maternal Uncle     Heart Failure Other         MI    Stroke Other     Colon Cancer Other        Social History     Socioeconomic History    Marital status:      Spouse name: Not on file    Number of children: Not on file    Years of education: Not on file    Highest education level: Not on file   Occupational History    Not on file   Social Needs    Financial resource strain: Not on file    Food insecurity:     Worry: Not on file     Inability: Not on file    Transportation needs:     Medical: Not on file     Non-medical: Not on file   Tobacco Use    Smoking status: Never Smoker    Smokeless tobacco: Never Used   Substance and Sexual Activity    Alcohol use: No     Alcohol/week: 0.0 standard drinks    Drug use: No    Sexual activity: Not on file   Lifestyle    Physical activity:     Days per week: Not on file     Minutes per session: Not on file    Stress: Not on file   Relationships    Social connections:     Talks on phone: Not on file     Gets together: Not on file     Attends [employer] service: Not on file     Active member of club or organization: Not on file     Attends meetings of clubs or organizations: Not on file     Relationship status: Not on file    Intimate partner violence:     Fear of current or ex partner: Not on file     Emotionally abused: Not on file     Physically abused: Not on file     Forced sexual activity: Not on file   Other Topics Concern    Not on file   Social History Narrative    Not on file         ALLERGIES: Hydrochlorothiazide and Norvasc [amlodipine]    Review of Systems   Constitutional: Negative. HENT: Negative. Eyes: Negative. Respiratory: Negative. Cardiovascular: Positive for palpitations. Gastrointestinal: Negative. Endocrine: Negative. Genitourinary: Negative. Musculoskeletal: Negative. Skin: Negative. Allergic/Immunologic: Negative. Neurological: Negative. Hematological: Negative. Psychiatric/Behavioral: Negative. All other systems reviewed and are negative. Vitals:    10/25/19 0308   BP: (!) 149/92   Pulse: 90   Resp: 16   Temp: 98.7 °F (37.1 °C)   SpO2: 100%   Weight: 113.4 kg (250 lb)   Height: 5' 2\" (1.575 m)            Physical Exam   Constitutional: She is oriented to person, place, and time. She appears well-developed and well-nourished. No distress. HENT:   Head: Normocephalic. Right Ear: External ear normal.   Left Ear: External ear normal.   Mouth/Throat: No oropharyngeal exudate. Eyes: Pupils are equal, round, and reactive to light. Conjunctivae and EOM are normal. Right eye exhibits no discharge. Left eye exhibits no discharge. No scleral icterus. Neck: Normal range of motion. Neck supple. No JVD present. No tracheal deviation present. No thyromegaly present. Cardiovascular: Normal rate, regular rhythm, normal heart sounds and intact distal pulses. Exam reveals no gallop and no friction rub. No murmur heard. Pulmonary/Chest: Effort normal and breath sounds normal. No stridor. No respiratory distress. She has no wheezes. She has no rales. She exhibits no tenderness.    Abdominal: Soft. Bowel sounds are normal. She exhibits no distension and no mass. There is no tenderness. There is no rebound and no guarding. Musculoskeletal: Normal range of motion. She exhibits no edema or tenderness. Lymphadenopathy:     She has no cervical adenopathy. Neurological: She is alert and oriented to person, place, and time. She displays normal reflexes. No cranial nerve deficit. She exhibits normal muscle tone. Coordination normal.   Skin: Skin is warm and dry. No rash noted. She is not diaphoretic. No erythema. No pallor. Nursing note and vitals reviewed. MDM: Differential diagnosis: Anemia, dehydration, atrial fibrillation, MI, supratentorial, anxiety     Hospital course: Patient patient remained stable with no evidence of tacky arrhythmia on monitor. Diagnosis: Palpitation    Disposition: Discharge home. Follow-up with her cardiologist in 2 days. Return to ER as needed. Dictation disclaimer:  Please note that this dictation was completed with [id number], the computer voice recognition software. Quite often unanticipated grammatical, syntax, homophones, and other interpretive errors are inadvertently transcribed by the computer software. Please disregard these errors. Please excuse any errors that have escaped final proofreading.

## 2019-12-18 ENCOUNTER — TELEPHONE (OUTPATIENT)
Dept: FAMILY MEDICINE CLINIC | Age: 54
End: 2019-12-18

## 2019-12-23 DIAGNOSIS — E78.5 HYPERLIPIDEMIA, UNSPECIFIED HYPERLIPIDEMIA TYPE: ICD-10-CM

## 2019-12-23 RX ORDER — PRAVASTATIN SODIUM 40 MG/1
TABLET ORAL
Qty: 90 TAB | Refills: 3 | Status: SHIPPED | OUTPATIENT
Start: 2019-12-23 | End: 2020-12-14

## 2019-12-23 RX ORDER — LOSARTAN POTASSIUM 100 MG/1
TABLET ORAL
Qty: 90 TAB | Refills: 1 | Status: SHIPPED | OUTPATIENT
Start: 2019-12-23 | End: 2020-06-22

## 2019-12-24 DIAGNOSIS — Z12.39 BREAST CANCER SCREENING: Primary | ICD-10-CM

## 2020-01-21 DIAGNOSIS — Z12.39 BREAST CANCER SCREENING: ICD-10-CM

## 2020-02-06 ENCOUNTER — OFFICE VISIT (OUTPATIENT)
Dept: CARDIOLOGY CLINIC | Age: 55
End: 2020-02-06

## 2020-02-06 VITALS
SYSTOLIC BLOOD PRESSURE: 136 MMHG | WEIGHT: 264 LBS | OXYGEN SATURATION: 99 % | BODY MASS INDEX: 48.58 KG/M2 | TEMPERATURE: 98.7 F | DIASTOLIC BLOOD PRESSURE: 69 MMHG | HEIGHT: 62 IN | HEART RATE: 77 BPM

## 2020-02-06 DIAGNOSIS — G47.33 OSA (OBSTRUCTIVE SLEEP APNEA): ICD-10-CM

## 2020-02-06 DIAGNOSIS — R00.2 PALPITATIONS: ICD-10-CM

## 2020-02-06 DIAGNOSIS — I10 ESSENTIAL HYPERTENSION: ICD-10-CM

## 2020-02-06 DIAGNOSIS — I34.0 NONRHEUMATIC MITRAL VALVE REGURGITATION: Primary | ICD-10-CM

## 2020-02-06 NOTE — PROGRESS NOTES
HISTORY OF PRESENT ILLNESS  Ovidio Ramírez is a 54 y.o. female. Patient is seen today for new patient evaluation. She is referred here for palpitation. She has a history of hypertension, hyperlipidemia. New Patient   The history is provided by the patient. This is a new problem. Episode onset: 2 months. Pertinent negatives include no chest pain, no abdominal pain, no headaches and no shortness of breath. Palpitations    The history is provided by the patient. This is a new problem. Episode onset: 2 months. The problem has been gradually worsening. The problem occurs every several days. The problem is associated with nothing. Pertinent negatives include no fever, no chest pain, no claudication, no orthopnea, no PND, no abdominal pain, no nausea, no vomiting, no headaches, no dizziness, no weakness, no cough, no hemoptysis, no shortness of breath and no sputum production. Review of Systems   Constitutional: Negative for chills and fever. HENT: Negative for nosebleeds. Eyes: Negative for blurred vision and double vision. Respiratory: Negative for cough, hemoptysis, sputum production, shortness of breath and wheezing. Cardiovascular: Positive for palpitations. Negative for chest pain, orthopnea, claudication, leg swelling and PND. Gastrointestinal: Negative for abdominal pain, heartburn, nausea and vomiting. Musculoskeletal: Negative for myalgias. Skin: Negative for rash. Neurological: Negative for dizziness, weakness and headaches. Endo/Heme/Allergies: Does not bruise/bleed easily.      Family History   Problem Relation Age of Onset    Hypertension Mother     Glaucoma Mother     Hypertension Father     Stroke Brother     Heart Disease Maternal Aunt         chf    Heart Failure Maternal Aunt         MI    Diabetes Maternal Uncle     Heart Failure Other         MI    Stroke Other     Colon Cancer Other        Past Medical History:   Diagnosis Date    Allergy     Anxiety 4/24/2010    Essential hypertension     HTN (hypertension) 4/24/2010    Hyperlipemia 4/24/2010    Hypokalemia 4/24/2010    Scoliosis        Past Surgical History:   Procedure Laterality Date    HX APPENDECTOMY  1979    HX HYSTERECTOMY  05/07/08    partial       Social History     Tobacco Use    Smoking status: Never Smoker    Smokeless tobacco: Never Used   Substance Use Topics    Alcohol use: No     Alcohol/week: 0.0 standard drinks       Allergies   Allergen Reactions    Hydrochlorothiazide Other (comments)     TINGLING IN BACK OF HEAD    Norvasc [Amlodipine] Other (comments)     Tingling in back of head       Prior to Admission medications    Medication Sig Start Date End Date Taking? Authorizing Provider   pravastatin (PRAVACHOL) 40 mg tablet TAKE 1 TABLET BY MOUTH EVERY DAY AT NIGHT 12/23/19  Yes Breanne GARDNER NP   losartan (COZAAR) 100 mg tablet TAKE 1 TABLET BY MOUTH EVERY DAY 12/23/19  Yes Breanne GARDNER NP   citalopram (CELEXA) 10 mg tablet TAKE 1/2 TABLET BY MOUTH ONCE DAILY 6/23/19  Yes Breanne GARDNER NP   Cholecalciferol, Vitamin D3, (VITAMIN D3) 2,000 unit cap 1 cap daily   10/26/12  Yes Daniel Bond MD   CALCIUM CITRATE/VITAMIN D3 (CITRACAL + D PO) Take  by mouth daily. 5/24/10  Yes Provider, Historical   MULTIVITAMINS (MULTIVITAMIN PO) Take 1 Tab by mouth daily. Yes Provider, Historical         Visit Vitals  /69 (BP 1 Location: Left arm, BP Patient Position: Sitting)   Pulse 77   Temp 98.7 °F (37.1 °C) (Oral)   Ht 5' 2\" (1.575 m)   Wt 119.7 kg (264 lb)   LMP  (LMP Unknown)   SpO2 99%   BMI 48.29 kg/m²         Physical Exam   Constitutional: She is oriented to person, place, and time. She appears well-developed and well-nourished. HENT:   Head: Normocephalic and atraumatic. Eyes: Conjunctivae are normal.   Neck: Neck supple. No JVD present. No tracheal deviation present. No thyromegaly present. Cardiovascular: Normal rate and regular rhythm.  PMI is not displaced. Exam reveals no gallop, no S3 and no decreased pulses. No murmur heard. Pulmonary/Chest: No respiratory distress. She has no wheezes. She has no rales. She exhibits no tenderness. Abdominal: Soft. There is no abdominal tenderness. Musculoskeletal:         General: No edema. Neurological: She is alert and oriented to person, place, and time. Skin: Skin is warm. Psychiatric: She has a normal mood and affect. Ms. Caesar Santacruz has a reminder for a \"due or due soon\" health maintenance. I have asked that she contact her primary care provider for follow-up on this health maintenance. I have personally reviewed patient's records available from hospital and other providers and incorporated findings in patient care. Notes,lab,old ekg,vascular study  I Have personally reviewed recent relevant labs available and discussed with patient  Interpretation Summary 11/2019       · Left Ventricle: Normal cavity size, wall thickness, systolic function (ejection fraction normal) and diastolic function. Estimated left ventricular ejection fraction is 56 - 60%. · Right Ventricle: Normal right ventricular size and function. · Mitral Valve: Mild mitral valve regurgitation is present. · Tricuspid Valve: Mild tricuspid valve regurgitation is present. · Pulmonary Artery: There is no evidence of pulmonary hypertension. 11/2019  Event monitorsinus rhythm no significant arrhythmia  Assessment         ICD-10-CM ICD-9-CM    1. SEFERINO (obstructive sleep apnea) G47.33 327.23     No evidence of pulmonary hypertension continue current treatment   2. Essential hypertension I10 401.9     Stable monitor   3. Palpitations R00.2 785.1     Event monitor showed no significant arrhythmia. Continue to monitor clinically   4. Nonrheumatic mitral valve regurgitation I34.0 424.0     Mild mitral vegetation noted on echo. Asymptomatic continue to monitor       There are no discontinued medications.     No orders of the defined types were placed in this encounter.

## 2020-02-06 NOTE — PROGRESS NOTES
1. Have you been to the ER, urgent care clinic since your last visit? Hospitalized since your last visit? Yes Where: [facility] Reason for visit: Palpitations    2. Have you seen or consulted any other health care providers outside of the [address] [place] [address] since your last visit? Include any pap smears or colon screening.  No

## 2020-03-16 ENCOUNTER — OFFICE VISIT (OUTPATIENT)
Dept: NEUROLOGY | Age: 55
End: 2020-03-16

## 2020-03-16 VITALS
OXYGEN SATURATION: 99 % | BODY MASS INDEX: 48.03 KG/M2 | HEIGHT: 62 IN | HEART RATE: 76 BPM | RESPIRATION RATE: 18 BRPM | SYSTOLIC BLOOD PRESSURE: 120 MMHG | DIASTOLIC BLOOD PRESSURE: 80 MMHG | WEIGHT: 261 LBS | TEMPERATURE: 98.3 F

## 2020-03-16 DIAGNOSIS — E66.01 MORBID OBESITY (HCC): ICD-10-CM

## 2020-03-16 DIAGNOSIS — G47.33 OSA (OBSTRUCTIVE SLEEP APNEA): Primary | ICD-10-CM

## 2020-03-16 NOTE — PROGRESS NOTES
3/16/2020 3:45 PM    SSN: [id number]       Subjective:   77-year-old female for follow-up of chief complaint of history of obstructive sleep apnea. Last visit here was in September. Reportedly she remains compliant on CPAP. She did have issues with chest discomfort around October and admits that for about a month or so she did not use it, but then she started again and over the last 4 to 5 months she has used it every night. She feels better with it. She had a 1 out of 90 days of use through December 16, last recorded information is from 20 September, and that night she had 5 hours and 3 minutes of use with an AHI of 1.3, median pressure of 13.7 with a maximum of 16 and median leak at 1.2 L/min. Her previous download was reviewed the last time here, through February 18 showed 90/90 days of use with average use of 5 hours and 17 minutes, AHI of 2.8, median pressure of 14.6 cm of H2O with max of 19 and median leak of 0.1 L/min. She is on auto CPAP with a minimum pressure of 7 and a maximum of 20.      Social History     Socioeconomic History    Marital status:      Spouse name: Not on file    Number of children: Not on file    Years of education: Not on file    Highest education level: Not on file   Occupational History    Not on file   Social Needs    Financial resource strain: Not on file    Food insecurity     Worry: Not on file     Inability: Not on file    Transportation needs     Medical: Not on file     Non-medical: Not on file   Tobacco Use    Smoking status: Never Smoker    Smokeless tobacco: Never Used   Substance and Sexual Activity    Alcohol use: No     Alcohol/week: 0.0 standard drinks    Drug use: No    Sexual activity: Not on file   Lifestyle    Physical activity     Days per week: Not on file     Minutes per session: Not on file    Stress: Not on file   Relationships    Social connections     Talks on phone: Not on file     Gets together: Not on file     Attends [employer] service: Not on file     Active member of club or organization: Not on file     Attends meetings of clubs or organizations: Not on file     Relationship status: Not on file    Intimate partner violence     Fear of current or ex partner: Not on file     Emotionally abused: Not on file     Physically abused: Not on file     Forced sexual activity: Not on file   Other Topics Concern    Not on file   Social History Narrative    Not on file       Family History   Problem Relation Age of Onset    Hypertension Mother     Glaucoma Mother     Hypertension Father     Stroke Brother     Heart Disease Maternal Aunt         chf    Heart Failure Maternal Aunt         MI    Diabetes Maternal Uncle     Heart Failure Other         MI    Stroke Other     Colon Cancer Other        Current Outpatient Medications   Medication Sig Dispense Refill    pravastatin (PRAVACHOL) 40 mg tablet TAKE 1 TABLET BY MOUTH EVERY DAY AT NIGHT 90 Tab 3    losartan (COZAAR) 100 mg tablet TAKE 1 TABLET BY MOUTH EVERY DAY 90 Tab 1    citalopram (CELEXA) 10 mg tablet TAKE 1/2 TABLET BY MOUTH ONCE DAILY 45 Tab 3    Cholecalciferol, Vitamin D3, (VITAMIN D3) 2,000 unit cap 1 cap daily   30 Cap prn    CALCIUM CITRATE/VITAMIN D3 (CITRACAL + D PO) Take  by mouth daily.  MULTIVITAMINS (MULTIVITAMIN PO) Take 1 Tab by mouth daily.          Past Medical History:   Diagnosis Date    Allergy     Anxiety 4/24/2010    Essential hypertension     HTN (hypertension) 4/24/2010    Hyperlipemia 4/24/2010    Hypokalemia 4/24/2010    Scoliosis        Past Surgical History:   Procedure Laterality Date    HX APPENDECTOMY  1979    HX HYSTERECTOMY  05/07/08    partial       Allergies   Allergen Reactions    Hydrochlorothiazide Other (comments)     TINGLING IN BACK OF HEAD    Norvasc [Amlodipine] Other (comments)     Tingling in back of head       Vital signs:    Visit Vitals  /80 (BP 1 Location: Left arm, BP Patient Position: Sitting)   Pulse 76   Temp 98.3 °F (36.8 °C)   Resp 18   Ht 5' 2\" (1.575 m)   Wt 118.4 kg (261 lb)   LMP  (LMP Unknown)   SpO2 99%   BMI 47.74 kg/m²       Review of Systems:   GENERAL: Denies fever or fatigue  CARDIAC: No CP or SOB  PULMONARY: No cough of SOB  MUSCULOSKELETAL: No new joint pain  NEURO: SEE HPI      EXAM: Alert, in NAD. Heart is regular. Oriented x3, EOM's are full, PERRL, no facial asymmetries. Strength and tone are normal. DTR's +2, gait symmetric           Assessment/Plan: Obstructive sleep apnea, remains well treated on CPAP with great compliance historically with some gaps. I counseled her about the importance of complying all the time. Optimal use and results will come with 7+ hours of use on a nightly basis. Some of her occasional headaches she complains to me today could be related to low CPAP compliance. We have contacted her medical equipment company. They are going to reviewed her machine and provide us with a download when available. In the meantime she will continue using it at current settings and return to see me in 2 months, advised her to bring her machine with her. PLEASE NOTE:   Portions of this document may have been produced using voice recognition software. Unrecognized errors in transcription may be present. This note will not be viewable in [address]

## 2020-03-16 NOTE — PATIENT INSTRUCTIONS
Thank you for choosing [facility] and [facility] for your     care. You may receive a survey about your visit. We appreciate you taking time     to complete this survey as we use your feedback to improve our services. We     realize we are not perfect, but we strive to provide excellent care.

## 2020-03-16 NOTE — PROGRESS NOTES
Chief Complaint   Patient presents with    Sleep Problem     follow up       Varghese Gill presents today for   Chief Complaint   Patient presents with    Sleep Problem     follow up       Is someone accompanying this pt? no    Is the patient using any DME equipment during OV? Yes, [employer]    Depression Screening:  3 most recent PHQ Screens 3/16/2020   PHQ Not Done -   Little interest or pleasure in doing things Not at all   Feeling down, depressed, irritable, or hopeless Not at all   Total Score PHQ 2 0       Learning Assessment:  Learning Assessment 9/11/2018   PRIMARY LEARNER Patient   HIGHEST LEVEL OF EDUCATION - PRIMARY LEARNER  GRADUATED HIGH SCHOOL OR GED   BARRIERS PRIMARY LEARNER NONE   CO-LEARNER CAREGIVER No   PRIMARY LANGUAGE ENGLISH    NEED No   LEARNER PREFERENCE PRIMARY DEMONSTRATION   ANSWERED BY patient   RELATIONSHIP SELF       Abuse Screening:  Abuse Screening Questionnaire 9/11/2018   Do you ever feel afraid of your partner? N   Are you in a relationship with someone who physically or mentally threatens you? N   Is it safe for you to go home? Y       Fall Risk  No flowsheet data found. Coordination of Care:  1. Have you been to the ER, urgent care clinic since your last visit? Hospitalized since your last visit? no    2. Have you seen or consulted any other health care providers outside of the [address] [place] [address] since your last visit? Include any pap smears or colon screening.  no

## 2020-04-07 ENCOUNTER — VIRTUAL VISIT (OUTPATIENT)
Dept: FAMILY MEDICINE CLINIC | Age: 55
End: 2020-04-07

## 2020-04-07 NOTE — PROGRESS NOTES
Brice Bence presents today for   Chief Complaint   Patient presents with    Hypertension    Cholesterol Problem     high chol    Blood sugar problem     prediabetes       Jolynn Brody preferred language for health care discussion is english/other. Is someone accompanying this pt? no    Is the patient using any DME equipment during [address]? no    Depression Screening:  3 most recent PHQ Screens 4/7/2020   PHQ Not Done -   Little interest or pleasure in doing things Not at all   Feeling down, depressed, irritable, or hopeless Not at all   Total Score PHQ 2 0       Learning Assessment:  Learning Assessment 4/7/2020   PRIMARY LEARNER Patient   HIGHEST LEVEL OF EDUCATION - PRIMARY LEARNER  GRADUATED HIGH SCHOOL OR GED   BARRIERS PRIMARY LEARNER NONE   CO-LEARNER CAREGIVER No   PRIMARY LANGUAGE ENGLISH    NEED No   LEARNER PREFERENCE PRIMARY DEMONSTRATION   ANSWERED BY patient   RELATIONSHIP SELF       Abuse Screening:  Abuse Screening Questionnaire 4/7/2020   Do you ever feel afraid of your partner? N   Are you in a relationship with someone who physically or mentally threatens you? N   Is it safe for you to go home? Y       Generalized Anxiety  No flowsheet data found. Health Maintenance Due   Topic Date Due    Shingrix Vaccine Age 50> (1 of 2) 01/22/2015    PAP AKA CERVICAL CYTOLOGY  03/01/2020   . Health Maintenance reviewed and discussed and ordered per Provider. Coordination of Care:  1. Have you been to the ER, urgent care clinic since your last visit? Hospitalized since your last visit? no    2. Have you seen or consulted any other health care providers outside of the [address] [place] [address] since your last visit? Include any pap smears or colon screening. no      Advance Directive:  1. Do you have an advance directive in place?  Patient Reply:no

## 2020-04-09 ENCOUNTER — VIRTUAL VISIT (OUTPATIENT)
Dept: FAMILY MEDICINE CLINIC | Age: 55
End: 2020-04-09

## 2020-04-09 DIAGNOSIS — R00.2 PALPITATION: ICD-10-CM

## 2020-04-09 DIAGNOSIS — R73.03 PREDIABETES: ICD-10-CM

## 2020-04-09 DIAGNOSIS — E78.5 HYPERLIPIDEMIA, UNSPECIFIED HYPERLIPIDEMIA TYPE: ICD-10-CM

## 2020-04-09 DIAGNOSIS — I10 ESSENTIAL HYPERTENSION: Primary | ICD-10-CM

## 2020-04-09 NOTE — PROGRESS NOTES
Cody Sutton is a 54 y.o. female evaluated via telephone on 2020. Patient verified name, , and address. Consent:  She and/or health care decision maker is aware that that she may receive a bill for this telephone service, depending on her insurance coverage, and has provided verbal consent to proceed: Yes    Chief Complaint   Patient presents with    Hypertension     ·Return in about 6 months (around 2020), or if symptoms worsen or fail to improve. Reports she did go to cardiologist as she was having palpitations and he had her wear a monitor for 30 days. He informed her that her palpitations were from stress as he did not see anything on echo or the monitor. She does have a follow up appointment with cardiology in August. Reports her palpitations have stopped. Denies chest pain and or shortness of breath. She reports she does have some swelling in her lower extremities when she stands for a long period of time but this does resolve when she sits and elevates her leg. She states she did check her blood pressure this morning and her blood pressure readings were 130/65 and 119/63 as she took these twice. She reports she has not had any crying episodes and she thinks the Celexa continues to work. Denies desire to hurt or harm herself or others. She denies suicidal ideations. She would like to go to a diet that only consists of seafood and fruits and vegetables and would like to get permission to do so. Documentation:  I communicated with the patient and/or health care decision maker about diet, exercise, hypertension- check blood pressure and take medications as prescribes, cholesterol, and palpitations. Details of this discussion including any medical advice provided: Go for fasting labs, follow up with cardiology, take medications as prescribed, adjust diet, and exercise. Diagnoses and all orders for this visit:    1.  Essential hypertension  -     METABOLIC PANEL, COMPREHENSIVE; Future  -     CBC WITH AUTOMATED DIFF; Future    2. Hyperlipidemia, unspecified hyperlipidemia type  -     METABOLIC PANEL, COMPREHENSIVE; Future  -     LIPID PANEL; Future  -     CBC WITH AUTOMATED DIFF; Future    3. Prediabetes  -     METABOLIC PANEL, COMPREHENSIVE; Future  -     CBC WITH AUTOMATED DIFF; Future  -     TSH 3RD GENERATION; Future  -     HEMOGLOBIN A1C WITH EAG; Future    4. BMI 45.0-49.9, adult (HCC)  -     METABOLIC PANEL, COMPREHENSIVE; Future  -     CBC WITH AUTOMATED DIFF; Future    5. Palpitation  -     METABOLIC PANEL, COMPREHENSIVE; Future  -     LIPID PANEL; Future        I affirm this is a Patient Initiated Episode with an Established Patient who has not had a related appointment within my department in the past 7 days or scheduled within the next 24 hours. Patient was in her home and I was in the office during this call.    Total Time: minutes: 11-20 minutes      Matt Morales NP

## 2020-04-10 ENCOUNTER — HOSPITAL ENCOUNTER (OUTPATIENT)
Dept: LAB | Age: 55
Discharge: HOME | End: 2020-04-10
Payer: COMMERCIAL

## 2020-04-10 LAB
ALBUMIN SERPL-MCNC: 3.6 G/DL (ref 3.4–5)
ALBUMIN/GLOB SERPL: 1.1 {RATIO} (ref 0.8–1.7)
ALP SERPL-CCNC: 115 U/L (ref 45–117)
ALT SERPL-CCNC: 16 U/L (ref 13–56)
ANION GAP SERPL CALC-SCNC: 6 MMOL/L (ref 3–18)
AST SERPL-CCNC: 10 U/L (ref 10–38)
BASOPHILS # BLD: 0 K/UL (ref 0–0.1)
BASOPHILS NFR BLD: 0 % (ref 0–2)
BILIRUB SERPL-MCNC: 0.4 MG/DL (ref 0.2–1)
BUN SERPL-MCNC: 10 MG/DL (ref 7–18)
BUN/CREAT SERPL: 14 (ref 12–20)
CALCIUM SERPL-MCNC: 8.6 MG/DL (ref 8.5–10.1)
CHLORIDE SERPL-SCNC: 108 MMOL/L (ref 100–111)
CHOLEST SERPL-MCNC: 164 MG/DL
CO2 SERPL-SCNC: 28 MMOL/L (ref 21–32)
CREAT SERPL-MCNC: 0.74 MG/DL (ref 0.6–1.3)
DIFFERENTIAL METHOD BLD: ABNORMAL
EOSINOPHIL # BLD: 0.2 K/UL (ref 0–0.4)
EOSINOPHIL NFR BLD: 3 % (ref 0–5)
ERYTHROCYTE [DISTWIDTH] IN BLOOD BY AUTOMATED COUNT: 14.3 % (ref 11.6–14.5)
EST. AVERAGE GLUCOSE BLD GHB EST-MCNC: 143 MG/DL
GLOBULIN SER CALC-MCNC: 3.4 G/DL (ref 2–4)
GLUCOSE SERPL-MCNC: 97 MG/DL (ref 74–99)
HBA1C MFR BLD: 6.6 % (ref 4.2–5.6)
HCT VFR BLD AUTO: 38.2 % (ref 35–45)
HDLC SERPL-MCNC: 51 MG/DL (ref 40–60)
HDLC SERPL: 3.2 {RATIO} (ref 0–5)
HGB BLD-MCNC: 11.9 G/DL (ref 12–16)
LDLC SERPL CALC-MCNC: 97 MG/DL (ref 0–100)
LIPID PROFILE,FLP: NORMAL
LYMPHOCYTES # BLD: 2.6 K/UL (ref 0.9–3.6)
LYMPHOCYTES NFR BLD: 44 % (ref 21–52)
MCH RBC QN AUTO: 25.6 PG (ref 24–34)
MCHC RBC AUTO-ENTMCNC: 31.2 G/DL (ref 31–37)
MCV RBC AUTO: 82.2 FL (ref 74–97)
MONOCYTES # BLD: 0.5 K/UL (ref 0.05–1.2)
MONOCYTES NFR BLD: 9 % (ref 3–10)
NEUTS SEG # BLD: 2.6 K/UL (ref 1.8–8)
NEUTS SEG NFR BLD: 44 % (ref 40–73)
PLATELET # BLD AUTO: 324 K/UL (ref 135–420)
PMV BLD AUTO: 10.1 FL (ref 9.2–11.8)
POTASSIUM SERPL-SCNC: 4.9 MMOL/L (ref 3.5–5.5)
PROT SERPL-MCNC: 7 G/DL (ref 6.4–8.2)
RBC # BLD AUTO: 4.65 M/UL (ref 4.2–5.3)
SODIUM SERPL-SCNC: 142 MMOL/L (ref 136–145)
TRIGL SERPL-MCNC: 80 MG/DL (ref ?–150)
TSH SERPL DL<=0.05 MIU/L-ACNC: 2 UIU/ML (ref 0.36–3.74)
VLDLC SERPL CALC-MCNC: 16 MG/DL
WBC # BLD AUTO: 5.9 K/UL (ref 4.6–13.2)

## 2020-04-10 PROCEDURE — 83036 HEMOGLOBIN GLYCOSYLATED A1C: CPT

## 2020-04-10 PROCEDURE — 80061 LIPID PANEL: CPT

## 2020-04-10 PROCEDURE — 36415 COLL VENOUS BLD VENIPUNCTURE: CPT

## 2020-04-10 PROCEDURE — 85025 COMPLETE CBC W/AUTO DIFF WBC: CPT

## 2020-04-10 PROCEDURE — 80053 COMPREHEN METABOLIC PANEL: CPT

## 2020-04-10 PROCEDURE — 84443 ASSAY THYROID STIM HORMONE: CPT

## 2020-04-22 ENCOUNTER — TELEPHONE (OUTPATIENT)
Dept: FAMILY MEDICINE CLINIC | Age: 55
End: 2020-04-22

## 2020-04-22 NOTE — TELEPHONE ENCOUNTER
Call to patient to discuss labs. No answer message left informing her that I was calling to follow up with no other information given.  Juan Carlos MARTIN

## 2020-04-23 ENCOUNTER — TELEPHONE (OUTPATIENT)
Dept: FAMILY MEDICINE CLINIC | Age: 55
End: 2020-04-23

## 2020-05-13 ENCOUNTER — VIRTUAL VISIT (OUTPATIENT)
Dept: FAMILY MEDICINE CLINIC | Age: 55
End: 2020-05-13

## 2020-05-13 DIAGNOSIS — Z71.2 ENCOUNTER TO DISCUSS TEST RESULTS: Primary | ICD-10-CM

## 2020-05-13 DIAGNOSIS — D64.9 ANEMIA, UNSPECIFIED TYPE: ICD-10-CM

## 2020-05-13 DIAGNOSIS — E11.9 CONTROLLED TYPE 2 DIABETES MELLITUS WITHOUT COMPLICATION, WITHOUT LONG-TERM CURRENT USE OF INSULIN (HCC): ICD-10-CM

## 2020-05-13 RX ORDER — METFORMIN HYDROCHLORIDE 500 MG/1
500 TABLET, EXTENDED RELEASE ORAL
Qty: 30 TAB | Refills: 1 | Status: SHIPPED | OUTPATIENT
Start: 2020-05-13 | End: 2020-06-04

## 2020-05-13 NOTE — PROGRESS NOTES
Brice Bence is a 54 y.o. female evaluated via audio only technology on 5/13/2020. Consent: She and/or her health care decision maker is aware that she may receive a bill for this audio only encounter, depending on her insurance coverage, and has provided verbal consent to proceed: Yes    I communicated with the patient and/or health care decision maker about the nature and details of the following:  Assessment & Plan:   Diagnoses and all orders for this visit:    1. Encounter to discuss test results    2. Controlled type 2 diabetes mellitus without complication, without long-term current use of insulin (HCC)  -     metFORMIN ER (GLUCOPHAGE XR) 500 mg tablet; Take 1 Tab by mouth daily (with dinner). 3. Anemia, unspecified type      Medication, side effects, possible allergic reactions and warnings reviewed with patient. Patient verbalized understanding. Discussed signs and symptoms of hypo and hyperglycemia. I have offered her a glucose meter and she has declined as she does not want to stick herself yet. She wants to get off of the medication so she will work on her diet and exercise which we discussed in detail. I have also discussed with her signs and symptoms of hypo and hyperglycemia. I offered her a dietician referral and she has declined. I have discussed her HM with her as well. She is to call opthalmology for follow-up. 12  Subjective:   Brice Bence is a 54 y.o. female who was seen for Results (discuss lab results)  Reports she has had some lower back pain and knee pain and she thinks this is from her weight. Low back pain is mostly on her right lower back. She does have right knee pain and she has a brace that she is using. She reports she has had both of these issues in the past and she went to physical therapy so she has started the exercises they gave her and this has helped some. She has started to change her diet and thinks this is why she lost her 2lbs.  She would like to discuss her labs. Reports she has been to the eye doctor this year and she had a follow up appointment but it was cancelled due to COVID-19. Prior to Admission medications    Medication Sig Start Date End Date Taking? Authorizing Provider   metFORMIN ER (GLUCOPHAGE XR) 500 mg tablet Take 1 Tab by mouth daily (with dinner). 5/13/20  Yes Greta GARDNER NP   pravastatin (PRAVACHOL) 40 mg tablet TAKE 1 TABLET BY MOUTH EVERY DAY AT NIGHT 12/23/19  Yes Greta GARDNER NP   losartan (COZAAR) 100 mg tablet TAKE 1 TABLET BY MOUTH EVERY DAY 12/23/19  Yes Greta GARDNER NP   citalopram (CELEXA) 10 mg tablet TAKE 1/2 TABLET BY MOUTH ONCE DAILY 6/23/19  Yes Greta GARDNER NP   Cholecalciferol, Vitamin D3, (VITAMIN D3) 2,000 unit cap 1 cap daily   10/26/12  Yes Rosie Gordon MD   CALCIUM CITRATE/VITAMIN D3 (CITRACAL + D PO) Take  by mouth daily. 5/24/10  Yes Provider, Historical   MULTIVITAMINS (MULTIVITAMIN PO) Take 1 Tab by mouth daily.    Yes Provider, Historical     Allergies   Allergen Reactions    Hydrochlorothiazide Other (comments)     TINGLING IN BACK OF HEAD    Norvasc [Amlodipine] Other (comments)     Tingling in back of head       Patient Active Problem List   Diagnosis Code    HTN (hypertension) I10    Hyperlipidemia E78.5    Anxiety F41.9    Hypokalemia E87.6    Tinnitus of both ears H93.13    HIV exposure Z20.6    Diverticula of colon K57.30    RUQ abdominal pain R10.11    Vitamin D deficiency E55.9    Hyperlipidemia E78.5    Snoring R06.83    SEFERINO on CPAP G47.33, Z99.89    Prediabetes R73.03    Obesity, morbid ([address] E66.01    Nonrheumatic mitral valve regurgitation I34.0     Patient Active Problem List    Diagnosis Date Noted    Nonrheumatic mitral valve regurgitation 02/06/2020    Obesity, morbid ([address] 05/18/2018    Prediabetes 03/01/2017    SEFERINO on CPAP 11/03/2016    Snoring 07/11/2016    Hyperlipidemia 11/21/2013    Vitamin D deficiency 10/26/2012    HIV exposure 09/14/2012    Diverticula of colon 09/14/2012    RUQ abdominal pain 09/14/2012    Tinnitus of both ears 06/27/2011    HTN (hypertension) 04/24/2010    Hyperlipidemia 04/24/2010    Anxiety 04/24/2010    Hypokalemia 04/24/2010     Current Outpatient Medications   Medication Sig Dispense Refill    metFORMIN ER (GLUCOPHAGE XR) 500 mg tablet Take 1 Tab by mouth daily (with dinner). 30 Tab 1    pravastatin (PRAVACHOL) 40 mg tablet TAKE 1 TABLET BY MOUTH EVERY DAY AT NIGHT 90 Tab 3    losartan (COZAAR) 100 mg tablet TAKE 1 TABLET BY MOUTH EVERY DAY 90 Tab 1    citalopram (CELEXA) 10 mg tablet TAKE 1/2 TABLET BY MOUTH ONCE DAILY 45 Tab 3    Cholecalciferol, Vitamin D3, (VITAMIN D3) 2,000 unit cap 1 cap daily   30 Cap prn    CALCIUM CITRATE/VITAMIN D3 (CITRACAL + D PO) Take  by mouth daily.  MULTIVITAMINS (MULTIVITAMIN PO) Take 1 Tab by mouth daily.        Allergies   Allergen Reactions    Hydrochlorothiazide Other (comments)     TINGLING IN BACK OF HEAD    Norvasc [Amlodipine] Other (comments)     Tingling in back of head     Past Medical History:   Diagnosis Date    Allergy     Anxiety 4/24/2010    Essential hypertension     HTN (hypertension) 4/24/2010    Hyperlipemia 4/24/2010    Hypokalemia 4/24/2010    Scoliosis      Past Surgical History:   Procedure Laterality Date    HX APPENDECTOMY  1979    HX HYSTERECTOMY  05/07/08    partial     Family History   Problem Relation Age of Onset    Hypertension Mother     Glaucoma Mother     Hypertension Father     Stroke Brother     Heart Disease Maternal Aunt         chf    Heart Failure Maternal Aunt         MI    Diabetes Maternal Uncle     Heart Failure Other         MI    Stroke Other     Colon Cancer Other      Social History     Tobacco Use    Smoking status: Never Smoker    Smokeless tobacco: Never Used   Substance Use Topics    Alcohol use: No     Alcohol/week: 0.0 standard drinks       Review of Systems   Eyes: Negative for blurred vision. Respiratory: Negative for shortness of breath. Cardiovascular: Negative for chest pain. Gastrointestinal: Negative for abdominal pain, nausea and vomiting. Musculoskeletal: Positive for back pain. Neurological: Negative for dizziness. I affirm this is a Patient-Initiated Episode with a Patient who has not had a related appointment within my department in the past 7 days or scheduled within the next 24 hours.     Total Time: minutes: 11-20 minutes    Note: not billable if this call serves to triage the patient into an appointment for the relevant concern      Jameson Chan NP

## 2020-06-16 ENCOUNTER — VIRTUAL VISIT (OUTPATIENT)
Dept: FAMILY MEDICINE CLINIC | Age: 55
End: 2020-06-16

## 2020-06-16 DIAGNOSIS — E11.9 CONTROLLED TYPE 2 DIABETES MELLITUS WITHOUT COMPLICATION, WITHOUT LONG-TERM CURRENT USE OF INSULIN (HCC): Primary | ICD-10-CM

## 2020-06-16 NOTE — PROGRESS NOTES
Aranza Alvarado is a 54 y.o. female evaluated via audio only technology on 6/16/2020. Consent: She and/or her health care decision maker is aware that she may receive a bill for this audio only encounter, depending on her insurance coverage, and has provided verbal consent to proceed: Yes    I communicated with the patient and/or health care decision maker about the nature and details of the following:  Assessment & Plan:   Diagnoses and all orders for this visit:    1. Controlled type 2 diabetes mellitus without complication, without long-term current use of insulin ([address]      Continue with diet and exercise. Have eye exam/report sent to this office. 12  Subjective:   Aranza Alvarado is a 54 y.o. female who was seen for Diabetes (started on Metformin last visit)  Going well on the metformin. Reports she had not being sick on the stomach. Denies diarrhea. She reports she has tried to reduce her sugar content. She is eating fish, eggs, vegetables, and fruits. She has lost weight. She is down to 242 lbs. Reports she is checking her blood glucose and the highest it has been has been 160's. Reports it has usually been  fasting. Reports she is not walking as much as she should but she is exercising. She has been to opthalmology as she went yesterday. Her blurry vision has improved. Prior to Admission medications    Medication Sig Start Date End Date Taking? Authorizing Provider   metFORMIN ER (GLUCOPHAGE XR) 500 mg tablet TAKE 1 TAB BY MOUTH DAILY (WITH DINNER).  6/4/20  Yes Ada Nettles NP   pravastatin (PRAVACHOL) 40 mg tablet TAKE 1 TABLET BY MOUTH EVERY DAY AT NIGHT 12/23/19  Yes Suri GARDNER NP   losartan (COZAAR) 100 mg tablet TAKE 1 TABLET BY MOUTH EVERY DAY 12/23/19  Yes Suri GARDNER NP   citalopram (CELEXA) 10 mg tablet TAKE 1/2 TABLET BY MOUTH ONCE DAILY 6/23/19  Yes Suri GARDNER NP   Cholecalciferol, Vitamin D3, (VITAMIN D3) 2,000 unit cap 1 cap daily   10/26/12  Yes Mariely Cid MD   CALCIUM CITRATE/VITAMIN D3 (CITRACAL + D PO) Take  by mouth daily. 5/24/10  Yes Provider, Historical   MULTIVITAMINS (MULTIVITAMIN PO) Take 1 Tab by mouth daily. Yes Provider, Historical     Allergies   Allergen Reactions    Hydrochlorothiazide Other (comments)     TINGLING IN BACK OF HEAD    Norvasc [Amlodipine] Other (comments)     Tingling in back of head       Patient Active Problem List   Diagnosis Code    HTN (hypertension) I10    Hyperlipidemia E78.5    Anxiety F41.9    Hypokalemia E87.6    Tinnitus of both ears H93.13    HIV exposure Z20.6    Diverticula of colon K57.30    RUQ abdominal pain R10.11    Vitamin D deficiency E55.9    Hyperlipidemia E78.5    Snoring R06.83    SEFERINO on CPAP G47.33, Z99.89    Prediabetes R73.03    Obesity, morbid ([address] E66.01    Nonrheumatic mitral valve regurgitation I34.0     Patient Active Problem List    Diagnosis Date Noted    Nonrheumatic mitral valve regurgitation 02/06/2020    Obesity, morbid ([address] 05/18/2018    Prediabetes 03/01/2017    SEFERINO on CPAP 11/03/2016    Snoring 07/11/2016    Hyperlipidemia 11/21/2013    Vitamin D deficiency 10/26/2012    HIV exposure 09/14/2012    Diverticula of colon 09/14/2012    RUQ abdominal pain 09/14/2012    Tinnitus of both ears 06/27/2011    HTN (hypertension) 04/24/2010    Hyperlipidemia 04/24/2010    Anxiety 04/24/2010    Hypokalemia 04/24/2010     Current Outpatient Medications   Medication Sig Dispense Refill    metFORMIN ER (GLUCOPHAGE XR) 500 mg tablet TAKE 1 TAB BY MOUTH DAILY (WITH DINNER).  30 Tab 5    pravastatin (PRAVACHOL) 40 mg tablet TAKE 1 TABLET BY MOUTH EVERY DAY AT NIGHT 90 Tab 3    losartan (COZAAR) 100 mg tablet TAKE 1 TABLET BY MOUTH EVERY DAY 90 Tab 1    citalopram (CELEXA) 10 mg tablet TAKE 1/2 TABLET BY MOUTH ONCE DAILY 45 Tab 3    Cholecalciferol, Vitamin D3, (VITAMIN D3) 2,000 unit cap 1 cap daily   30 Cap prn    CALCIUM CITRATE/VITAMIN D3 (CITRACAL + D PO) Take  by mouth daily.  MULTIVITAMINS (MULTIVITAMIN PO) Take 1 Tab by mouth daily. Allergies   Allergen Reactions    Hydrochlorothiazide Other (comments)     TINGLING IN BACK OF HEAD    Norvasc [Amlodipine] Other (comments)     Tingling in back of head     Past Medical History:   Diagnosis Date    Allergy     Anxiety 4/24/2010    Essential hypertension     HTN (hypertension) 4/24/2010    Hyperlipemia 4/24/2010    Hypokalemia 4/24/2010    Scoliosis      Past Surgical History:   Procedure Laterality Date    HX APPENDECTOMY  1979    HX HYSTERECTOMY  05/07/08    partial     Family History   Problem Relation Age of Onset    Hypertension Mother     Glaucoma Mother     Hypertension Father     Stroke Brother     Heart Disease Maternal Aunt         chf    Heart Failure Maternal Aunt         MI    Diabetes Maternal Uncle     Heart Failure Other         MI    Stroke Other     Colon Cancer Other      Social History     Tobacco Use    Smoking status: Never Smoker    Smokeless tobacco: Never Used   Substance Use Topics    Alcohol use: No     Alcohol/week: 0.0 standard drinks       Review of Systems   Constitutional: Negative for fever. Eyes: Negative for blurred vision. Respiratory: Negative for shortness of breath. Cardiovascular: Negative for chest pain. Gastrointestinal: Negative for abdominal pain, diarrhea, nausea and vomiting. Neurological: Negative for dizziness. I affirm this is a Patient-Initiated Episode with a Patient who has not had a related appointment within my department in the past 7 days or scheduled within the next 24 hours.     Total Time: minutes: 5-10 minutes    Note: not billable if this call serves to triage the patient into an appointment for the relevant concern      Yung Spence NP

## 2020-06-16 NOTE — PROGRESS NOTES
Hayden Bejarano presents today for   Chief Complaint   Patient presents with    Diabetes     started on Metformin last visit       Hayden Bejarano preferred language for health care discussion is english/other. Is someone accompanying this pt? no    Is the patient using any DME equipment during [address]? no    Depression Screening:  3 most recent PHQ Screens 4/7/2020   PHQ Not Done -   Little interest or pleasure in doing things Not at all   Feeling down, depressed, irritable, or hopeless Not at all   Total Score PHQ 2 0       Learning Assessment:  Learning Assessment 4/7/2020   PRIMARY LEARNER Patient   HIGHEST LEVEL OF EDUCATION - PRIMARY LEARNER  GRADUATED HIGH SCHOOL OR GED   BARRIERS PRIMARY LEARNER NONE   CO-LEARNER CAREGIVER No   PRIMARY LANGUAGE ENGLISH    NEED No   LEARNER PREFERENCE PRIMARY DEMONSTRATION   ANSWERED BY patient   RELATIONSHIP SELF       Abuse Screening:  Abuse Screening Questionnaire 4/7/2020   Do you ever feel afraid of your partner? N   Are you in a relationship with someone who physically or mentally threatens you? N   Is it safe for you to go home? Y       Generalized Anxiety  No flowsheet data found. Health Maintenance Due   Topic Date Due    Foot Exam Q1  01/22/1975    MICROALBUMIN Q1  01/22/1975    Eye Exam Retinal or Dilated  01/22/1975    Shingrix Vaccine Age 50> (1 of 2) 01/22/2015    PAP AKA CERVICAL CYTOLOGY  03/01/2020   . Health Maintenance reviewed and discussed and ordered per Provider. Coordination of Care:  1. Have you been to the ER, urgent care clinic since your last visit? Hospitalized since your last visit? no    2. Have you seen or consulted any other health care providers outside of the [address] [place] [address] since your last visit? Include any pap smears or colon screening.  Yes, eye dr      Advance Directive:  Discussed 2/6/20

## 2020-06-21 DIAGNOSIS — F41.8 DEPRESSION WITH ANXIETY: ICD-10-CM

## 2020-06-22 RX ORDER — LOSARTAN POTASSIUM 100 MG/1
TABLET ORAL
Qty: 90 TAB | Refills: 1 | Status: SHIPPED | OUTPATIENT
Start: 2020-06-22 | End: 2020-07-08 | Stop reason: SDUPTHER

## 2020-06-22 RX ORDER — CITALOPRAM 10 MG/1
TABLET ORAL
Qty: 45 TAB | Refills: 3 | Status: SHIPPED | OUTPATIENT
Start: 2020-06-22 | End: 2022-02-24 | Stop reason: ALTCHOICE

## 2020-07-08 RX ORDER — LOSARTAN POTASSIUM 100 MG/1
TABLET ORAL
Qty: 90 TAB | Refills: 0 | Status: SHIPPED | OUTPATIENT
Start: 2020-07-08 | End: 2020-08-05 | Stop reason: SDUPTHER

## 2020-07-15 ENCOUNTER — OFFICE VISIT (OUTPATIENT)
Dept: NEUROLOGY | Age: 55
End: 2020-07-15

## 2020-07-15 VITALS
HEIGHT: 62 IN | SYSTOLIC BLOOD PRESSURE: 124 MMHG | HEART RATE: 67 BPM | WEIGHT: 239 LBS | OXYGEN SATURATION: 98 % | DIASTOLIC BLOOD PRESSURE: 80 MMHG | BODY MASS INDEX: 43.98 KG/M2 | RESPIRATION RATE: 18 BRPM | TEMPERATURE: 96.6 F

## 2020-07-15 DIAGNOSIS — E66.01 MORBID OBESITY (HCC): ICD-10-CM

## 2020-07-15 DIAGNOSIS — G47.33 OSA (OBSTRUCTIVE SLEEP APNEA): Primary | ICD-10-CM

## 2020-07-15 NOTE — PROGRESS NOTES
Johanne Yoel presents today for   Chief Complaint   Patient presents with    Sleep Problem     follow up       Is someone accompanying this pt? no    Is the patient using any DME equipment during OV? Yes, [employer]    Depression Screening:  3 most recent PHQ Screens 4/7/2020   PHQ Not Done -   Little interest or pleasure in doing things Not at all   Feeling down, depressed, irritable, or hopeless Not at all   Total Score PHQ 2 0       Learning Assessment:  Learning Assessment 4/7/2020   PRIMARY LEARNER Patient   HIGHEST LEVEL OF EDUCATION - PRIMARY LEARNER  GRADUATED HIGH SCHOOL OR GED   BARRIERS PRIMARY LEARNER NONE   CO-LEARNER CAREGIVER No   PRIMARY LANGUAGE ENGLISH    NEED No   LEARNER PREFERENCE PRIMARY DEMONSTRATION   ANSWERED BY patient   RELATIONSHIP SELF       Abuse Screening:  Abuse Screening Questionnaire 4/7/2020   Do you ever feel afraid of your partner? N   Are you in a relationship with someone who physically or mentally threatens you? N   Is it safe for you to go home? Y       Fall Risk  No flowsheet data found. Coordination of Care:  1. Have you been to the ER, urgent care clinic since your last visit? Hospitalized since your last visit? no    2. Have you seen or consulted any other health care providers outside of the [address] [place] [address] since your last visit? Include any pap smears or colon screening.  no

## 2020-07-16 NOTE — PROGRESS NOTES
7/16/2020 3:45 PM    SSN: [id number]       Subjective:   78-year-old female for follow-up of chief complaint of history of obstructive sleep apnea. Last visit here was in March. She brings her machine. I have interrogated this. She reports that she is doing well, comfortable, not snoring, and feeling a lot more rested. Over the last 30 days she had 30 out of 30 days of use with average use of 5.4 hours/min, with very low leak and an AHI of 2.9. Mean CPAP pressure is 9.1 with a peak of 12.2. She has no complaints about the system or the mask. She is happy with her results.       Social History     Socioeconomic History    Marital status:      Spouse name: Not on file    Number of children: Not on file    Years of education: Not on file    Highest education level: Not on file   Occupational History    Not on file   Social Needs    Financial resource strain: Not on file    Food insecurity     Worry: Not on file     Inability: Not on file    Transportation needs     Medical: Not on file     Non-medical: Not on file   Tobacco Use    Smoking status: Never Smoker    Smokeless tobacco: Never Used   Substance and Sexual Activity    Alcohol use: No     Alcohol/week: 0.0 standard drinks    Drug use: No    Sexual activity: Not on file   Lifestyle    Physical activity     Days per week: Not on file     Minutes per session: Not on file    Stress: Not on file   Relationships    Social connections     Talks on phone: Not on file     Gets together: Not on file     Attends Taoist service: Not on file     Active member of club or organization: Not on file     Attends meetings of clubs or organizations: Not on file     Relationship status: Not on file    Intimate partner violence     Fear of current or ex partner: Not on file     Emotionally abused: Not on file     Physically abused: Not on file     Forced sexual activity: Not on file   Other Topics Concern    Not on file Social History Narrative    Not on file       Family History   Problem Relation Age of Onset    Hypertension Mother     Glaucoma Mother     Hypertension Father     Stroke Brother     Heart Disease Maternal Aunt         chf    Heart Failure Maternal Aunt         MI    Diabetes Maternal Uncle     Heart Failure Other         MI    Stroke Other     Colon Cancer Other        Current Outpatient Medications   Medication Sig Dispense Refill    losartan (COZAAR) 100 mg tablet TAKE 1 TABLET BY MOUTH EVERY DAY 90 Tab 0    citalopram (CELEXA) 10 mg tablet TAKE 1/2 TABLET BY MOUTH ONCE DAILY 45 Tab 3    metFORMIN ER (GLUCOPHAGE XR) 500 mg tablet TAKE 1 TAB BY MOUTH DAILY (WITH DINNER). 30 Tab 5    pravastatin (PRAVACHOL) 40 mg tablet TAKE 1 TABLET BY MOUTH EVERY DAY AT NIGHT 90 Tab 3    Cholecalciferol, Vitamin D3, (VITAMIN D3) 2,000 unit cap 1 cap daily   30 Cap prn    CALCIUM CITRATE/VITAMIN D3 (CITRACAL + D PO) Take  by mouth daily.  MULTIVITAMINS (MULTIVITAMIN PO) Take 1 Tab by mouth daily.          Past Medical History:   Diagnosis Date    Allergy     Anxiety 4/24/2010    Essential hypertension     HTN (hypertension) 4/24/2010    Hyperlipemia 4/24/2010    Hypokalemia 4/24/2010    Scoliosis        Past Surgical History:   Procedure Laterality Date    HX APPENDECTOMY  1979    HX HYSTERECTOMY  05/07/08    partial       Allergies   Allergen Reactions    Hydrochlorothiazide Other (comments)     TINGLING IN BACK OF HEAD    Norvasc [Amlodipine] Other (comments)     Tingling in back of head       Vital signs:    Visit Vitals  /80 (BP 1 Location: Left arm, BP Patient Position: Sitting)   Pulse 67   Temp (!) 96.6 °F (35.9 °C)   Resp 18   Ht 5' 2\" (1.575 m)   Wt 108.4 kg (239 lb)   LMP  (LMP Unknown)   SpO2 98%   BMI 43.71 kg/m²       Review of Systems:   GENERAL: Denies fever or fatigue  CARDIAC: No CP or SOB  PULMONARY: No cough of SOB  MUSCULOSKELETAL: No new joint pain  NEURO: SEE HPI      EXAM: Alert, in NAD. Heart is regular. Oriented x3, EOM's are full, PERRL, no facial asymmetries. Strength and tone are normal. DTR's +2, gait symmetric         Assessment/Plan: Obstructive sleep apnea, remains well treated on CPAP with great compliance historically. I again advised her that optimal  results will come with 7+ hours of use on a nightly basis. She will work on this. She will need a follow-up in 6 months. PLEASE NOTE:   Portions of this document may have been produced using voice recognition software. Unrecognized errors in transcription may be present. This note will not be viewable in [address]

## 2020-08-06 ENCOUNTER — OFFICE VISIT (OUTPATIENT)
Dept: CARDIOLOGY CLINIC | Age: 55
End: 2020-08-06

## 2020-08-06 VITALS
TEMPERATURE: 97 F | BODY MASS INDEX: 43.17 KG/M2 | HEART RATE: 60 BPM | HEIGHT: 62 IN | OXYGEN SATURATION: 100 % | DIASTOLIC BLOOD PRESSURE: 62 MMHG | SYSTOLIC BLOOD PRESSURE: 126 MMHG | WEIGHT: 234.6 LBS

## 2020-08-06 DIAGNOSIS — I34.0 NONRHEUMATIC MITRAL VALVE REGURGITATION: Primary | ICD-10-CM

## 2020-08-06 DIAGNOSIS — I10 ESSENTIAL HYPERTENSION: ICD-10-CM

## 2020-08-06 DIAGNOSIS — G47.33 OSA (OBSTRUCTIVE SLEEP APNEA): ICD-10-CM

## 2020-08-06 DIAGNOSIS — E11.9 TYPE 2 DIABETES MELLITUS WITHOUT COMPLICATION, WITHOUT LONG-TERM CURRENT USE OF INSULIN (HCC): ICD-10-CM

## 2020-08-06 DIAGNOSIS — E78.5 HYPERLIPIDEMIA, UNSPECIFIED HYPERLIPIDEMIA TYPE: ICD-10-CM

## 2020-08-06 RX ORDER — LOSARTAN POTASSIUM 100 MG/1
TABLET ORAL
Qty: 90 TAB | Refills: 0 | Status: SHIPPED | OUTPATIENT
Start: 2020-08-06 | End: 2020-11-09 | Stop reason: SDUPTHER

## 2020-08-06 NOTE — PROGRESS NOTES
1. Have you been to the ER, urgent care clinic since your last visit? Hospitalized since your last visit? No    2. Have you seen or consulted any other health care providers outside of the [address] [place] [address] since your last visit? Include any pap smears or colon screening.  No

## 2020-08-06 NOTE — PROGRESS NOTES
HISTORY OF PRESENT ILLNESS  Ailyn Torrez is a 54 y.o. female. 10/2019  Patient is seen today for new patient evaluation. She is referred here for palpitation. She has a history of hypertension, hyperlipidemia. Palpitations    The history is provided by the patient. This is a new problem. Episode onset: 2 months. The problem has been gradually worsening. The problem occurs every several days. The problem is associated with nothing. Pertinent negatives include no fever, no claudication, no orthopnea, no PND, no nausea, no vomiting, no dizziness, no weakness, no cough, no hemoptysis and no sputum production. Her past medical history is significant for hypertension. Valvular Heart Disease   The history is provided by the patient. This is a chronic problem. The problem occurs constantly. The problem has not changed since onset. Hypertension   The history is provided by the patient. This is a chronic problem. The problem occurs constantly. The problem has not changed since onset. Review of Systems   Constitutional: Negative for chills and fever. HENT: Negative for nosebleeds. Eyes: Negative for blurred vision and double vision. Respiratory: Negative for cough, hemoptysis, sputum production and wheezing. Cardiovascular: Positive for palpitations. Negative for orthopnea, claudication, leg swelling and PND. Gastrointestinal: Negative for heartburn, nausea and vomiting. Musculoskeletal: Negative for myalgias. Skin: Negative for rash. Neurological: Negative for dizziness and weakness. Endo/Heme/Allergies: Does not bruise/bleed easily.      Family History   Problem Relation Age of Onset    Hypertension Mother     Glaucoma Mother     Hypertension Father     Stroke Brother     Heart Disease Maternal Aunt         chf    Heart Failure Maternal Aunt         MI    Diabetes Maternal Uncle     Heart Failure Other         MI    Stroke Other     Colon Cancer Other        Past Medical History:   Diagnosis Date    Allergy     Anxiety 4/24/2010    Essential hypertension     HTN (hypertension) 4/24/2010    Hyperlipemia 4/24/2010    Hypokalemia 4/24/2010    Scoliosis        Past Surgical History:   Procedure Laterality Date    HX APPENDECTOMY  1979    HX HYSTERECTOMY  05/07/08    partial       Social History     Tobacco Use    Smoking status: Never Smoker    Smokeless tobacco: Never Used   Substance Use Topics    Alcohol use: No     Alcohol/week: 0.0 standard drinks       Allergies   Allergen Reactions    Hydrochlorothiazide Other (comments)     TINGLING IN BACK OF HEAD    Norvasc [Amlodipine] Other (comments)     Tingling in back of head       Prior to Admission medications    Medication Sig Start Date End Date Taking? Authorizing Provider   losartan (COZAAR) 100 mg tablet TAKE 1 TABLET BY MOUTH EVERY DAY 8/6/20  Yes Tish GARDNER NP   citalopram (CELEXA) 10 mg tablet TAKE 1/2 TABLET BY MOUTH ONCE DAILY 6/22/20  Yes Tish GARDNER NP   metFORMIN ER (GLUCOPHAGE XR) 500 mg tablet TAKE 1 TAB BY MOUTH DAILY (WITH DINNER). 6/4/20  Yes Ada Bragg NP   pravastatin (PRAVACHOL) 40 mg tablet TAKE 1 TABLET BY MOUTH EVERY DAY AT NIGHT 12/23/19  Yes Tish GARDNER NP   Cholecalciferol, Vitamin D3, (VITAMIN D3) 2,000 unit cap 1 cap daily   10/26/12  Yes Merary Briseno MD   CALCIUM CITRATE/VITAMIN D3 (CITRACAL + D PO) Take  by mouth daily. 5/24/10  Yes Provider, Historical   MULTIVITAMINS (MULTIVITAMIN PO) Take 1 Tab by mouth daily. Yes Provider, Historical         Visit Vitals  /62 (BP 1 Location: Left arm, BP Patient Position: Sitting)   Pulse 60   Temp 97 °F (36.1 °C) (Temporal)   Ht 5' 2\" (1.575 m)   Wt 106.4 kg (234 lb 9.6 oz)   LMP  (LMP Unknown)   SpO2 100%   BMI 42.91 kg/m²         Physical Exam   Constitutional: She is oriented to person, place, and time. She appears well-developed and well-nourished. HENT:   Head: Normocephalic and atraumatic.    Eyes: Conjunctivae are normal.   Neck: Neck supple. No JVD present. No tracheal deviation present. No thyromegaly present. Cardiovascular: Normal rate and regular rhythm. PMI is not displaced. Exam reveals no gallop, no S3 and no decreased pulses. No murmur heard. Pulmonary/Chest: No respiratory distress. She has no wheezes. She has no rales. She exhibits no tenderness. Abdominal: Soft. There is no abdominal tenderness. Musculoskeletal:         General: No edema. Neurological: She is alert and oriented to person, place, and time. Skin: Skin is warm. Psychiatric: She has a normal mood and affect. Ms. Mayur Castellon has a reminder for a \"due or due soon\" health maintenance. I have asked that she contact her primary care provider for follow-up on this health maintenance. I have personally reviewed patient's records available from hospital and other providers and incorporated findings in patient care. Notes,lab,old ekg,vascular study  I Have personally reviewed recent relevant labs available and discussed with patient  Interpretation Summary 11/2019       · Left Ventricle: Normal cavity size, wall thickness, systolic function (ejection fraction normal) and diastolic function. Estimated left ventricular ejection fraction is 56 - 60%. · Right Ventricle: Normal right ventricular size and function. · Mitral Valve: Mild mitral valve regurgitation is present. · Tricuspid Valve: Mild tricuspid valve regurgitation is present. · Pulmonary Artery: There is no evidence of pulmonary hypertension. 11/2019  Event monitor-sinus rhythm no significant arrhythmia     I Have personally reviewed recent relevant labs available and discussed with patient  For 2020-CBC, BMP, LFT, lipid, TSH      Assessment         ICD-10-CM ICD-9-CM    1. Nonrheumatic mitral valve regurgitation  I34.0 424.0     Mild stable asymptomatic continue monitoring   2.  SEFERINO (obstructive sleep apnea)  G47.33 327.23     Continue treatment monitor 3. Essential hypertension  I10 401.9     Stable continue treatment   4. Hyperlipidemia, unspecified hyperlipidemia type  E78.5 272.4     Continue therapy lab with PCP. Continue with diet and exercise   5. Type 2 diabetes mellitus without complication, without long-term current use of insulin (HCC)  E11.9 250.00     Patient on oral hypoglycemics continue treatment recent hemoglobin A1c was reviewed   8/2020  Cardiac status stable. Symptoms are improving. Now with new onset diabetes on treatment. There are no discontinued medications. No orders of the defined types were placed in this encounter. Follow-up and Dispositions    · Return in about 6 months (around 2/6/2021).

## 2020-09-15 ENCOUNTER — HOSPITAL ENCOUNTER (OUTPATIENT)
Dept: LAB | Age: 55
Discharge: HOME | End: 2020-09-15
Payer: COMMERCIAL

## 2020-09-15 ENCOUNTER — OFFICE VISIT (OUTPATIENT)
Dept: FAMILY MEDICINE CLINIC | Age: 55
End: 2020-09-15

## 2020-09-15 ENCOUNTER — TELEPHONE (OUTPATIENT)
Dept: FAMILY MEDICINE CLINIC | Age: 55
End: 2020-09-15

## 2020-09-15 VITALS
SYSTOLIC BLOOD PRESSURE: 133 MMHG | OXYGEN SATURATION: 100 % | BODY MASS INDEX: 42.14 KG/M2 | HEART RATE: 60 BPM | RESPIRATION RATE: 16 BRPM | HEIGHT: 62 IN | WEIGHT: 229 LBS | TEMPERATURE: 98.5 F | DIASTOLIC BLOOD PRESSURE: 66 MMHG

## 2020-09-15 DIAGNOSIS — I10 ESSENTIAL HYPERTENSION: ICD-10-CM

## 2020-09-15 DIAGNOSIS — E11.9 CONTROLLED TYPE 2 DIABETES MELLITUS WITHOUT COMPLICATION, WITHOUT LONG-TERM CURRENT USE OF INSULIN (HCC): Primary | ICD-10-CM

## 2020-09-15 DIAGNOSIS — Z23 ENCOUNTER FOR IMMUNIZATION: ICD-10-CM

## 2020-09-15 DIAGNOSIS — E11.9 CONTROLLED TYPE 2 DIABETES MELLITUS WITHOUT COMPLICATION, WITHOUT LONG-TERM CURRENT USE OF INSULIN (HCC): ICD-10-CM

## 2020-09-15 DIAGNOSIS — Z23 NEEDS FLU SHOT: ICD-10-CM

## 2020-09-15 DIAGNOSIS — E78.5 HYPERLIPIDEMIA, UNSPECIFIED HYPERLIPIDEMIA TYPE: ICD-10-CM

## 2020-09-15 DIAGNOSIS — Z02.89 ENCOUNTER FOR COMPLETION OF FORM WITH PATIENT: ICD-10-CM

## 2020-09-15 DIAGNOSIS — R68.89 COLD INTOLERANCE: ICD-10-CM

## 2020-09-15 DIAGNOSIS — Z71.89 COUNSELING ON HEALTH PROMOTION AND DISEASE PREVENTION: ICD-10-CM

## 2020-09-15 DIAGNOSIS — Z12.11 COLON CANCER SCREENING: ICD-10-CM

## 2020-09-15 LAB
CREAT UR-MCNC: 34 MG/DL (ref 30–125)
HBA1C MFR BLD HPLC: 5.7 %
HGB BLD-MCNC: 12.8 G/DL
MICROALBUMIN UR-MCNC: 0.73 MG/DL (ref 0–3)
MICROALBUMIN/CREAT UR-RTO: 21 MG/G (ref 0–30)

## 2020-09-15 PROCEDURE — 82043 UR ALBUMIN QUANTITATIVE: CPT

## 2020-09-15 NOTE — PROGRESS NOTES
Reyes Johnson presents today for   Chief Complaint   Patient presents with    Diabetes       Reyes Johnson preferred language for health care discussion is english/other. Is someone accompanying this pt? no    Is the patient using any DME equipment during [address]? no    Depression Screening:  3 most recent PHQ Screens 4/7/2020   PHQ Not Done -   Little interest or pleasure in doing things Not at all   Feeling down, depressed, irritable, or hopeless Not at all   Total Score PHQ 2 0       Learning Assessment:  Learning Assessment 4/7/2020   PRIMARY LEARNER Patient   HIGHEST LEVEL OF EDUCATION - PRIMARY LEARNER  GRADUATED HIGH SCHOOL OR GED   BARRIERS PRIMARY LEARNER NONE   CO-LEARNER CAREGIVER No   PRIMARY LANGUAGE ENGLISH    NEED No   LEARNER PREFERENCE PRIMARY DEMONSTRATION   ANSWERED BY patient   RELATIONSHIP SELF       Abuse Screening:  Abuse Screening Questionnaire 4/7/2020   Do you ever feel afraid of your partner? N   Are you in a relationship with someone who physically or mentally threatens you? N   Is it safe for you to go home? Y       Generalized Anxiety  No flowsheet data found. Health Maintenance Due   Topic Date Due    Pneumococcal 0-64 years (1 of 1 - PPSV23) 01/22/1971    Foot Exam Q1  01/22/1975    MICROALBUMIN Q1  01/22/1975    Eye Exam Retinal or Dilated  01/22/1975    Shingrix Vaccine Age 50> (1 of 2) 01/22/2015    PAP AKA CERVICAL CYTOLOGY  03/01/2020    Flu Vaccine (1) 09/01/2020    FOBT Q1Y Age 50-75  10/04/2020   . Health Maintenance reviewed and discussed and ordered per Provider. Coordination of Care:  1. Have you been to the ER, urgent care clinic since your last visit? Hospitalized since your last visit? no    2. Have you seen or consulted any other health care providers outside of the [address] [place] [address] since your last visit? Include any pap smears or colon screening.  no      Advance Directive:  Discussed 2/6/20

## 2020-09-15 NOTE — PATIENT INSTRUCTIONS
Vaccine Information Statement    Influenza (Flu) Vaccine (Inactivated or Recombinant): What You Need to Know    Many Vaccine Information Statements are available in Persian and other languages. See www.immunize.org/vis  Hojas de información sobre vacunas están disponibles en español y en muchos otros idiomas. Visite www.immunize.org/vis    1. Why get vaccinated? Influenza vaccine can prevent influenza (flu). Flu is a contagious disease that spreads around the [facility] every year, usually between October and May. Anyone can get the flu, but it is more dangerous for some people. Infants and young children, people 72years of age and older, pregnant women, and people with certain health conditions or a weakened immune system are at greatest risk of flu complications. Pneumonia, bronchitis, sinus infections and ear infections are examples of flu-related complications. If you have a medical condition, such as heart disease, cancer or diabetes, flu can make it worse. Flu can cause fever and chills, sore throat, muscle aches, fatigue, cough, headache, and runny or stuffy nose. Some people may have vomiting and diarrhea, though this is more common in children than adults. Each year thousands of people in the [facility] die from flu, and many more are hospitalized. Flu vaccine prevents millions of illnesses and flu-related visits to the doctor each year. 2. Influenza vaccines     CDC recommends everyone 10months of age and older get vaccinated every flu season. Children 6 months through 6years of age may need 2 doses during a single flu season. Everyone else needs only 1 dose each flu season. It takes about 2 weeks for protection to develop after vaccination. There are many flu viruses, and they are always changing. Each year a new flu vaccine is made to protect against three or four viruses that are likely to cause disease in the upcoming flu season.  Even when the vaccine doesnt exactly match these viruses, it may still provide some protection. Influenza vaccine does not cause flu. Influenza vaccine may be given at the same time as other vaccines. 3. Talk with your health care provider    Tell your vaccine provider if the person getting the vaccine:   Has had an allergic reaction after a previous dose of influenza vaccine, or has any severe, life-threatening allergies.  Has ever had Guillain-Barré Syndrome (also called GBS). In some cases, your health care provider may decide to postpone influenza vaccination to a future visit. People with minor illnesses, such as a cold, may be vaccinated. People who are moderately or severely ill should usually wait until they recover before getting influenza vaccine. Your health care provider can give you more information. 4. Risks of a reaction     Soreness, redness, and swelling where shot is given, fever, muscle aches, and headache can happen after influenza vaccine.  There may be a very small increased risk of Guillain-Barré Syndrome (GBS) after inactivated influenza vaccine (the flu shot). Yovany Commander children who get the flu shot along with pneumococcal vaccine (PCV13), and/or DTaP vaccine at the same time might be slightly more likely to have a seizure caused by fever. Tell your health care provider if a child who is getting flu vaccine has ever had a seizure. People sometimes faint after medical procedures, including vaccination. Tell your provider if you feel dizzy or have vision changes or ringing in the ears. As with any medicine, there is a very remote chance of a vaccine causing a severe allergic reaction, other serious injury, or death. 5. What if there is a serious problem? An allergic reaction could occur after the vaccinated person leaves the clinic.  If you see signs of a severe allergic reaction (hives, swelling of the face and throat, difficulty breathing, a fast heartbeat, dizziness, or weakness), call 9-1-1 and get the person to the nearest hospital.    For other signs that concern you, call your health care provider. Adverse reactions should be reported to the Vaccine Adverse Event Reporting System (VAERS). Your health care provider will usually file this report, or you can do it yourself. Visit the VAERS website at www.vaers. [facility].gov or call 0-778.605.8296. VAERS is only for reporting reactions, and VAERS staff do not give medical advice. 6. The National Vaccine Injury Compensation Program    The [facility] Vaccine Injury Compensation Program (VICP) is a federal program that was created to compensate people who may have been injured by certain vaccines. Visit the VICP website at www.[facility].gov/vaccinecompensation or call 2-334.879.9016 to learn about the program and about filing a claim. There is a time limit to file a claim for compensation. 7. How can I learn more?  Ask your health care provider.  Call your local or state health department.  Contact the Centers for Disease Control and Prevention (CDC):  - Call 2-110.246.9558 (1-800-CDC-INFO) or  - Visit CDCs influenza website at www.cdc.gov/flu    Vaccine Information Statement (Interim)  Inactivated Influenza Vaccine   8/15/2019  [address] [id number]   Department of Health and Human Services  Centers for Disease Control and Prevention    Office Use Only      Vaccine Information Statement    Influenza (Flu) Vaccine (Inactivated or Recombinant): What You Need to Know    Many Vaccine Information Statements are available in Greek and other languages. See www.immunize.org/vis  Hojas de información sobre vacunas están disponibles en español y en muchos otros idiomas. Visite www.immunize.org/vis    1. Why get vaccinated? Influenza vaccine can prevent influenza (flu). Flu is a contagious disease that spreads around the [place] every year, usually between October and May. Anyone can get the flu, but it is more dangerous for some people. Infants and young children, people 72years of age and older, pregnant women, and people with certain health conditions or a weakened immune system are at greatest risk of flu complications. Pneumonia, bronchitis, sinus infections and ear infections are examples of flu-related complications. If you have a medical condition, such as heart disease, cancer or diabetes, flu can make it worse. Flu can cause fever and chills, sore throat, muscle aches, fatigue, cough, headache, and runny or stuffy nose. Some people may have vomiting and diarrhea, though this is more common in children than adults. Each year thousands of people in the [facility] die from flu, and many more are hospitalized. Flu vaccine prevents millions of illnesses and flu-related visits to the doctor each year. 2. Influenza vaccines     CDC recommends everyone 10months of age and older get vaccinated every flu season. Children 6 months through 6years of age may need 2 doses during a single flu season. Everyone else needs only 1 dose each flu season. It takes about 2 weeks for protection to develop after vaccination. There are many flu viruses, and they are always changing. Each year a new flu vaccine is made to protect against three or four viruses that are likely to cause disease in the upcoming flu season. Even when the vaccine doesnt exactly match these viruses, it may still provide some protection. Influenza vaccine does not cause flu. Influenza vaccine may be given at the same time as other vaccines. 3. Talk with your health care provider    Tell your vaccine provider if the person getting the vaccine:   Has had an allergic reaction after a previous dose of influenza vaccine, or has any severe, life-threatening allergies.  Has ever had Guillain-Barré Syndrome (also called GBS). In some cases, your health care provider may decide to postpone influenza vaccination to a future visit.     People with minor illnesses, such as a cold, may be vaccinated. People who are moderately or severely ill should usually wait until they recover before getting influenza vaccine. Your health care provider can give you more information. 4. Risks of a reaction     Soreness, redness, and swelling where shot is given, fever, muscle aches, and headache can happen after influenza vaccine.  There may be a very small increased risk of Guillain-Barré Syndrome (GBS) after inactivated influenza vaccine (the flu shot). Urban Archer children who get the flu shot along with pneumococcal vaccine (PCV13), and/or DTaP vaccine at the same time might be slightly more likely to have a seizure caused by fever. Tell your health care provider if a child who is getting flu vaccine has ever had a seizure. People sometimes faint after medical procedures, including vaccination. Tell your provider if you feel dizzy or have vision changes or ringing in the ears. As with any medicine, there is a very remote chance of a vaccine causing a severe allergic reaction, other serious injury, or death. 5. What if there is a serious problem? An allergic reaction could occur after the vaccinated person leaves the clinic. If you see signs of a severe allergic reaction (hives, swelling of the face and throat, difficulty breathing, a fast heartbeat, dizziness, or weakness), call 9-1-1 and get the person to the nearest hospital.    For other signs that concern you, call your health care provider. Adverse reactions should be reported to the Vaccine Adverse Event Reporting System (VAERS). Your health care provider will usually file this report, or you can do it yourself. Visit the VAERS website at www.vaers. hhs.gov or call 0-120.631.6051. VAERS is only for reporting reactions, and VAERS staff do not give medical advice.     6. The National Vaccine Injury Compensation Program    The [employer] Vaccine Injury Compensation Program (VICP) is a federal program that was created to compensate people who may have been injured by certain vaccines. Visit the VICP website at www.[facility].gov/vaccinecompensation or call 8-585.376.4882 to learn about the program and about filing a claim. There is a time limit to file a claim for compensation. 7. How can I learn more?  Ask your health care provider.  Call your local or state health department.  Contact the Centers for Disease Control and Prevention (CDC):  - Call 6-211.258.1628 (1-800-CDC-INFO) or  - Visit CDCs influenza website at www.cdc.gov/flu    Vaccine Information Statement (Interim)  Inactivated Influenza Vaccine   8/15/2019  42 WIL[address] Jaquez [id number]   Department of Health and Human Services  Centers for Disease Control and Prevention    Office Use Only

## 2020-09-18 ENCOUNTER — HOSPITAL ENCOUNTER (OUTPATIENT)
Dept: LAB | Age: 55
Discharge: HOME | End: 2020-09-18
Payer: COMMERCIAL

## 2020-09-18 DIAGNOSIS — Z12.11 COLON CANCER SCREENING: ICD-10-CM

## 2020-09-18 PROCEDURE — 82274 ASSAY TEST FOR BLOOD FECAL: CPT

## 2020-09-21 LAB — HEMOCCULT STL QL IA: NEGATIVE

## 2020-11-09 ENCOUNTER — TELEPHONE (OUTPATIENT)
Dept: FAMILY MEDICINE CLINIC | Age: 55
End: 2020-11-09

## 2020-11-09 RX ORDER — LOSARTAN POTASSIUM 100 MG/1
TABLET ORAL
Qty: 90 TAB | Refills: 1 | Status: SHIPPED | OUTPATIENT
Start: 2020-11-09 | End: 2021-05-05

## 2020-11-09 NOTE — TELEPHONE ENCOUNTER
Pt called requesting refill for medication .   Requested Prescriptions     Pending Prescriptions Disp Refills    losartan (COZAAR) 100 mg tablet 90 Tab 0     Sig: TAKE 1 TABLET BY MOUTH EVERY DAY   `

## 2020-12-16 ENCOUNTER — VIRTUAL VISIT (OUTPATIENT)
Dept: FAMILY MEDICINE CLINIC | Age: 55
End: 2020-12-16
Payer: COMMERCIAL

## 2020-12-16 DIAGNOSIS — Z71.89 COUNSELING ON HEALTH PROMOTION AND DISEASE PREVENTION: ICD-10-CM

## 2020-12-16 DIAGNOSIS — E11.9 CONTROLLED TYPE 2 DIABETES MELLITUS WITHOUT COMPLICATION, WITHOUT LONG-TERM CURRENT USE OF INSULIN (HCC): ICD-10-CM

## 2020-12-16 DIAGNOSIS — E78.5 HYPERLIPIDEMIA, UNSPECIFIED HYPERLIPIDEMIA TYPE: ICD-10-CM

## 2020-12-16 DIAGNOSIS — Z20.6 HIV EXPOSURE: ICD-10-CM

## 2020-12-16 DIAGNOSIS — R00.2 PALPITATIONS: ICD-10-CM

## 2020-12-16 DIAGNOSIS — R00.2 PALPITATIONS: Primary | ICD-10-CM

## 2020-12-16 DIAGNOSIS — I10 ESSENTIAL HYPERTENSION: ICD-10-CM

## 2020-12-16 PROCEDURE — 99214 OFFICE O/P EST MOD 30 MIN: CPT | Performed by: NURSE PRACTITIONER

## 2020-12-16 NOTE — PROGRESS NOTES
Bernice Monday is a 54 y.o. female who was seen by synchronous (real-time) audio-video technology on 12/16/2020 for Diabetes, Hypertension, and Cholesterol Problem (high chol)  Blood glucose this morning was 99 - she is watching her diet and taking her medications as prescribed. BP was 132/63- taking her medications and her cholesterol medications as prescribed. Temp 97.5  Weight 222  She has had some recent heart flutters. She does have an appointment with cardiology but that is not till Jan. She denies chest pain and or shortness of breath. She denies any dizziness.  has HIV and she would like to be tested due to her risk. Assessment & Plan:   Diagnoses and all orders for this visit:    1. Palpitations  -     EKG, 12 LEAD, INITIAL; Future    2. Hyperlipidemia, unspecified hyperlipidemia type  -     METABOLIC PANEL, COMPREHENSIVE; Future  -     LIPID PANEL; Future  -     CBC WITH AUTOMATED DIFF; Future  -     TSH 3RD GENERATION; Future  -     T4, FREE; Future  -     HEMOGLOBIN A1C WITH EAG; Future    3. Controlled type 2 diabetes mellitus without complication, without long-term current use of insulin (HCC)  -     METABOLIC PANEL, COMPREHENSIVE; Future  -     LIPID PANEL; Future  -     CBC WITH AUTOMATED DIFF; Future  -     TSH 3RD GENERATION; Future  -     T4, FREE; Future  -     HEMOGLOBIN A1C WITH EAG; Future    4. Essential hypertension  -     METABOLIC PANEL, COMPREHENSIVE; Future  -     LIPID PANEL; Future  -     CBC WITH AUTOMATED DIFF; Future  -     TSH 3RD GENERATION; Future  -     T4, FREE; Future  -     HEMOGLOBIN A1C WITH EAG; Future    5. HIV exposure  -     HIV 1/2 AG/AB, 4TH GENERATION,W RFLX CONFIRM; Future    6. Counseling on health promotion and disease prevention            Subjective:       Prior to Admission medications    Medication Sig Start Date End Date Taking?  Authorizing Provider   pravastatin (PRAVACHOL) 40 mg tablet TAKE 1 TABLET BY MOUTH EVERY DAY EVERY NIGHT 12/14/20  Yes Mauricio GARDNER NP   metFORMIN ER (GLUCOPHAGE XR) 500 mg tablet TAKE 1 TAB BY MOUTH DAILY (WITH DINNER). 12/14/20  Yes Mauricio GARDNER NP   losartan (COZAAR) 100 mg tablet TAKE 1 TABLET BY MOUTH EVERY DAY 11/9/20  Yes Mauricio GARDNER NP   citalopram (CELEXA) 10 mg tablet TAKE 1/2 TABLET BY MOUTH ONCE DAILY 6/22/20  Yes Mauricio GARDNER NP   Cholecalciferol, Vitamin D3, (VITAMIN D3) 2,000 unit cap 1 cap daily   10/26/12  Yes Erick Camacho MD   CALCIUM CITRATE/VITAMIN D3 (CITRACAL + D PO) Take  by mouth daily. 5/24/10  Yes Provider, Historical   MULTIVITAMINS (MULTIVITAMIN PO) Take 1 Tab by mouth daily. Yes Provider, Historical     Patient Active Problem List   Diagnosis Code    HTN (hypertension) I10    Hyperlipidemia E78.5    Anxiety F41.9    Hypokalemia E87.6    Tinnitus of both ears H93.13    HIV exposure Z20.6    Diverticula of colon K57.30    RUQ abdominal pain R10.11    Vitamin D deficiency E55.9    Hyperlipidemia E78.5    Snoring R06.83    SEFERINO on CPAP G47.33, Z99.89    Prediabetes R73.03    Obesity, morbid ([address] E66.01    Nonrheumatic mitral valve regurgitation I34.0     Patient Active Problem List    Diagnosis Date Noted    Nonrheumatic mitral valve regurgitation 02/06/2020    Obesity, morbid ([address] 05/18/2018    Prediabetes 03/01/2017    SEFERINO on CPAP 11/03/2016    Snoring 07/11/2016    Hyperlipidemia 11/21/2013    Vitamin D deficiency 10/26/2012    HIV exposure 09/14/2012    Diverticula of colon 09/14/2012    RUQ abdominal pain 09/14/2012    Tinnitus of both ears 06/27/2011    HTN (hypertension) 04/24/2010    Hyperlipidemia 04/24/2010    Anxiety 04/24/2010    Hypokalemia 04/24/2010     Current Outpatient Medications   Medication Sig Dispense Refill    pravastatin (PRAVACHOL) 40 mg tablet TAKE 1 TABLET BY MOUTH EVERY DAY EVERY NIGHT 90 Tab 1    metFORMIN ER (GLUCOPHAGE XR) 500 mg tablet TAKE 1 TAB BY MOUTH DAILY (WITH DINNER).  90 Tab 1    losartan (COZAAR) 100 mg tablet TAKE 1 TABLET BY MOUTH EVERY DAY 90 Tab 1    citalopram (CELEXA) 10 mg tablet TAKE 1/2 TABLET BY MOUTH ONCE DAILY 45 Tab 3    Cholecalciferol, Vitamin D3, (VITAMIN D3) 2,000 unit cap 1 cap daily   30 Cap prn    CALCIUM CITRATE/VITAMIN D3 (CITRACAL + D PO) Take  by mouth daily.  MULTIVITAMINS (MULTIVITAMIN PO) Take 1 Tab by mouth daily. Allergies   Allergen Reactions    Hydrochlorothiazide Other (comments)     TINGLING IN BACK OF HEAD    Norvasc [Amlodipine] Other (comments)     Tingling in back of head     Past Medical History:   Diagnosis Date    Allergy     Anxiety 4/24/2010    Essential hypertension     HTN (hypertension) 4/24/2010    Hyperlipemia 4/24/2010    Hypokalemia 4/24/2010    Scoliosis      Past Surgical History:   Procedure Laterality Date    HX APPENDECTOMY  1979    HX HYSTERECTOMY  05/07/08    partial     Family History   Problem Relation Age of Onset    Hypertension Mother     Glaucoma Mother     Hypertension Father     Stroke Brother     Heart Disease Maternal Aunt         chf    Heart Failure Maternal Aunt         MI    Diabetes Maternal Uncle     Heart Failure Other         MI    Stroke Other     Colon Cancer Other      Social History     Tobacco Use    Smoking status: Never Smoker    Smokeless tobacco: Never Used   Substance Use Topics    Alcohol use: No     Alcohol/week: 0.0 standard drinks       Review of Systems   Constitutional: Negative for fever. HENT: Negative for congestion. Respiratory: Negative for cough and shortness of breath. Cardiovascular: Positive for palpitations. Negative for chest pain. Gastrointestinal: Negative for abdominal pain, blood in stool, nausea and vomiting. Genitourinary: Negative for hematuria. Musculoskeletal: Negative for falls. Neurological: Negative for dizziness and weakness. Objective:   No flowsheet data found.      [INSTRUCTIONS:  \"[x]\" Indicates a positive item  \"[]\" Indicates a negative item  -- DELETE ALL ITEMS NOT EXAMINED]    Constitutional: [x] Appears well-developed and well-nourished [x] No apparent distress      [] Abnormal -     Mental status: [x] Alert and awake  [x] Oriented to person/place/time [x] Able to follow commands    [] Abnormal -     Eyes:   EOM    [x]  Normal    [] Abnormal -   Sclera  [x]  Normal    [] Abnormal -          Discharge [x]  None visible   [] Abnormal -     HENT: [x] Normocephalic, atraumatic  [] Abnormal -   [x] Mouth/Throat: Mucous membranes are moist    External Ears [x] Normal  [] Abnormal -    Neck: [x] No visualized mass [] Abnormal -     Pulmonary/Chest: [x] Respiratory effort normal   [x] No visualized signs of difficulty breathing or respiratory distress        [] Abnormal -      Musculoskeletal:   [x] Normal gait with no signs of ataxia         [x] Normal range of motion of neck        [] Abnormal -     Neurological:        [x] No Facial Asymmetry (Cranial nerve 7 motor function) (limited exam due to video visit)          [x] No gaze palsy        [] Abnormal -          Skin:        [x] No significant exanthematous lesions or discoloration noted on facial skin         [] Abnormal -            Psychiatric:       [x] Normal Affect [] Abnormal -        [x] No Hallucinations  We discussed the expected course, resolution and complications of the diagnosis(es) in detail. Medication risks, benefits, costs, interactions, and alternatives were discussed as indicated. I advised her to contact the office if her condition worsens, changes or fails to improve as anticipated. She expressed understanding with the diagnosis(es) and plan. Dorothy Sarmiento, who was evaluated through a patient-initiated, synchronous (real-time) audio-video encounter, and/or her healthcare decision maker, is aware that it is a billable service, with coverage as determined by her insurance carrier.  She provided verbal consent to proceed: Yes, and patient identification was verified. It was conducted pursuant to the emergency declaration under the [facility], [address] [place] [address] and the [employer] Act. A caregiver was present when appropriate. Ability to conduct physical exam was limited. I was at home. The patient was at home.       Fred Amador NP

## 2020-12-16 NOTE — PROGRESS NOTES
Zainab Faulkner presents today for   Chief Complaint   Patient presents with    Diabetes    Hypertension    Cholesterol Problem     high chol       Jolynn He preferred language for health care discussion is english/other. Is someone accompanying this pt? no    Is the patient using any DME equipment during [address]? no    Depression Screening:  3 most recent PHQ Screens 4/7/2020   PHQ Not Done -   Little interest or pleasure in doing things Not at all   Feeling down, depressed, irritable, or hopeless Not at all   Total Score PHQ 2 0       Learning Assessment:  Learning Assessment 4/7/2020   PRIMARY LEARNER Patient   HIGHEST LEVEL OF EDUCATION - PRIMARY LEARNER  GRADUATED HIGH SCHOOL OR GED   BARRIERS PRIMARY LEARNER NONE   CO-LEARNER CAREGIVER No   PRIMARY LANGUAGE ENGLISH    NEED No   LEARNER PREFERENCE PRIMARY DEMONSTRATION   ANSWERED BY patient   RELATIONSHIP SELF       Abuse Screening:  Abuse Screening Questionnaire 4/7/2020   Do you ever feel afraid of your partner? N   Are you in a relationship with someone who physically or mentally threatens you? N   Is it safe for you to go home? Y       Generalized Anxiety  No flowsheet data found. Health Maintenance Due   Topic Date Due    Eye Exam Retinal or Dilated  01/22/1975    Shingrix Vaccine Age 50> (1 of 2) 01/22/2015    PAP AKA CERVICAL CYTOLOGY  03/01/2020   . Health Maintenance reviewed and discussed and ordered per Provider. Coordination of Care:  1. Have you been to the ER, urgent care clinic since your last visit? Hospitalized since your last visit? no    2. Have you seen or consulted any other health care providers outside of the [address] [place] [address] since your last visit? Include any pap smears or colon screening.  no      Advance Directive:  Discussed 2/6/20

## 2020-12-28 ENCOUNTER — HOSPITAL ENCOUNTER (OUTPATIENT)
Dept: LAB | Age: 55
Discharge: HOME | End: 2020-12-28
Payer: COMMERCIAL

## 2020-12-28 ENCOUNTER — HOSPITAL ENCOUNTER (OUTPATIENT)
Dept: LAB | Age: 55
Discharge: HOME | End: 2020-12-28

## 2020-12-28 LAB
ATRIAL RATE: 51 BPM
CALCULATED P AXIS, ECG09: 33 DEGREES
CALCULATED R AXIS, ECG10: -12 DEGREES
CALCULATED T AXIS, ECG11: 30 DEGREES
DIAGNOSIS, 93000: NORMAL
P-R INTERVAL, ECG05: 160 MS
Q-T INTERVAL, ECG07: 440 MS
QRS DURATION, ECG06: 78 MS
QTC CALCULATION (BEZET), ECG08: 405 MS
VENTRICULAR RATE, ECG03: 51 BPM
XX-LABCORP SPECIMEN COL,LCBCF: NORMAL

## 2020-12-28 PROCEDURE — 99001 SPECIMEN HANDLING PT-LAB: CPT

## 2020-12-28 PROCEDURE — 93005 ELECTROCARDIOGRAM TRACING: CPT

## 2020-12-29 LAB
ALBUMIN SERPL-MCNC: 4.5 G/DL (ref 3.8–4.9)
ALBUMIN/GLOB SERPL: 2 {RATIO} (ref 1.2–2.2)
ALP SERPL-CCNC: 105 IU/L (ref 39–117)
ALT SERPL-CCNC: 9 IU/L (ref 0–32)
AST SERPL-CCNC: 15 IU/L (ref 0–40)
BASOPHILS # BLD AUTO: 0 X10E3/UL (ref 0–0.2)
BASOPHILS NFR BLD AUTO: 1 %
BILIRUB SERPL-MCNC: 0.3 MG/DL (ref 0–1.2)
BUN SERPL-MCNC: 11 MG/DL (ref 6–24)
BUN/CREAT SERPL: 16 (ref 9–23)
CALCIUM SERPL-MCNC: 9.5 MG/DL (ref 8.7–10.2)
CHLORIDE SERPL-SCNC: 99 MMOL/L (ref 96–106)
CHOLEST SERPL-MCNC: 166 MG/DL (ref 100–199)
CO2 SERPL-SCNC: 25 MMOL/L (ref 20–29)
CREAT SERPL-MCNC: 0.68 MG/DL (ref 0.57–1)
EOSINOPHIL # BLD AUTO: 0.2 X10E3/UL (ref 0–0.4)
EOSINOPHIL NFR BLD AUTO: 4 %
ERYTHROCYTE [DISTWIDTH] IN BLOOD BY AUTOMATED COUNT: 13.1 % (ref 11.7–15.4)
EST. AVERAGE GLUCOSE BLD GHB EST-MCNC: 123 MG/DL
GLOBULIN SER CALC-MCNC: 2.3 G/DL (ref 1.5–4.5)
GLUCOSE SERPL-MCNC: 90 MG/DL (ref 65–99)
HBA1C MFR BLD: 5.9 % (ref 4.8–5.6)
HCT VFR BLD AUTO: 41.5 % (ref 34–46.6)
HDLC SERPL-MCNC: 57 MG/DL
HGB BLD-MCNC: 13.3 G/DL (ref 11.1–15.9)
HIV 1+2 AB+HIV1 P24 AG SERPL QL IA: NON REACTIVE
IMM GRANULOCYTES # BLD AUTO: 0 X10E3/UL (ref 0–0.1)
IMM GRANULOCYTES NFR BLD AUTO: 0 %
INTERPRETATION, 910389: NORMAL
LDLC SERPL CALC-MCNC: 97 MG/DL (ref 0–99)
LYMPHOCYTES # BLD AUTO: 2.4 X10E3/UL (ref 0.7–3.1)
LYMPHOCYTES NFR BLD AUTO: 53 %
MCH RBC QN AUTO: 26.4 PG (ref 26.6–33)
MCHC RBC AUTO-ENTMCNC: 32 G/DL (ref 31.5–35.7)
MCV RBC AUTO: 82 FL (ref 79–97)
MONOCYTES # BLD AUTO: 0.3 X10E3/UL (ref 0.1–0.9)
MONOCYTES NFR BLD AUTO: 6 %
NEUTROPHILS # BLD AUTO: 1.6 X10E3/UL (ref 1.4–7)
NEUTROPHILS NFR BLD AUTO: 36 %
PLATELET # BLD AUTO: 296 X10E3/UL (ref 150–450)
POTASSIUM SERPL-SCNC: 4.6 MMOL/L (ref 3.5–5.2)
PROT SERPL-MCNC: 6.8 G/DL (ref 6–8.5)
RBC # BLD AUTO: 5.04 X10E6/UL (ref 3.77–5.28)
SODIUM SERPL-SCNC: 139 MMOL/L (ref 134–144)
T4 FREE SERPL-MCNC: 1.22 NG/DL (ref 0.82–1.77)
TRIGL SERPL-MCNC: 63 MG/DL (ref 0–149)
TSH SERPL DL<=0.005 MIU/L-ACNC: 1.67 UIU/ML (ref 0.45–4.5)
VLDLC SERPL CALC-MCNC: 12 MG/DL (ref 5–40)
WBC # BLD AUTO: 4.5 X10E3/UL (ref 3.4–10.8)

## 2021-01-02 NOTE — PROGRESS NOTES
Sent in Nicolas Smith [address]74 is a copy of your labs. Nice job on getting your A1C down. We will discuss these in more detail at your upcoming appointment.  ROLF Rangel, LINDAP-C

## 2021-01-12 ENCOUNTER — VIRTUAL VISIT (OUTPATIENT)
Dept: FAMILY MEDICINE CLINIC | Age: 56
End: 2021-01-12
Payer: COMMERCIAL

## 2021-01-12 DIAGNOSIS — I10 ESSENTIAL HYPERTENSION: ICD-10-CM

## 2021-01-12 DIAGNOSIS — Z71.89 COUNSELING ON HEALTH PROMOTION AND DISEASE PREVENTION: ICD-10-CM

## 2021-01-12 DIAGNOSIS — R23.2 HOT FLASHES: ICD-10-CM

## 2021-01-12 DIAGNOSIS — E78.5 HYPERLIPIDEMIA, UNSPECIFIED HYPERLIPIDEMIA TYPE: Primary | ICD-10-CM

## 2021-01-12 DIAGNOSIS — Z20.6 HIV EXPOSURE: ICD-10-CM

## 2021-01-12 DIAGNOSIS — E11.9 CONTROLLED TYPE 2 DIABETES MELLITUS WITHOUT COMPLICATION, WITHOUT LONG-TERM CURRENT USE OF INSULIN (HCC): ICD-10-CM

## 2021-01-12 PROCEDURE — 99213 OFFICE O/P EST LOW 20 MIN: CPT | Performed by: NURSE PRACTITIONER

## 2021-01-12 NOTE — PROGRESS NOTES
Italo Easley is a 54 y.o. female who was seen by synchronous (real-time) audio-video technology on 1/12/2021 for Follow-up (1 month), Diabetes, Hypertension, Palpitations, and Results (discuss lab results)    Reports she is trying to keep her blood glucose down. She reports her blood glucose was 97 this morning  122/67 was her blood pressure this morning. She is taking her blood pressure medications as prescribed with no chest pain and or shortness of breath. Temp was 97. She did gain a pound 220.8   Reports she is having some hot flashes. She would like to know if there is something OTC. She has also been for her labs and would like her results.  is HIV positive. Assessment & Plan:   Diagnoses and all orders for this visit:    1. Hyperlipidemia, unspecified hyperlipidemia type    2. Controlled type 2 diabetes mellitus without complication, without long-term current use of insulin ([facility] [address]    3. Essential hypertension    4. Hot flashes    5. HIV exposure    6. BMI 40.0-44.9, adult [facility])    Labs reviewed. OTC Black Cohosh for hot flashes. I have discussed and offered HIV Prep. Patient will think about this but she is not currently interested. Continue to take medications as prescribed. We have also discussed her diet and exercise. Subjective:       Prior to Admission medications    Medication Sig Start Date End Date Taking? Authorizing Provider   pravastatin (PRAVACHOL) 40 mg tablet TAKE 1 TABLET BY MOUTH EVERY DAY EVERY NIGHT 12/14/20  Yes Kingston GARDNER NP   metFORMIN ER (GLUCOPHAGE XR) 500 mg tablet TAKE 1 TAB BY MOUTH DAILY (WITH DINNER).  12/14/20  Yes Kingston GARDNER NP   losartan (COZAAR) 100 mg tablet TAKE 1 TABLET BY MOUTH EVERY DAY 11/9/20  Yes Kingston GARDNER NP   citalopram (CELEXA) 10 mg tablet TAKE 1/2 TABLET BY MOUTH ONCE DAILY 6/22/20  Yes Kingston GARDNER NP   Cholecalciferol, Vitamin D3, (VITAMIN D3) 2,000 unit cap 1 cap daily   10/26/12  Yes Sarah Pickens MD CALCIUM CITRATE/VITAMIN D3 (CITRACAL + D PO) Take  by mouth daily. 5/24/10  Yes Provider, Historical   MULTIVITAMINS (MULTIVITAMIN PO) Take 1 Tab by mouth daily. Yes Provider, Historical     Patient Active Problem List   Diagnosis Code    HTN (hypertension) I10    Hyperlipidemia E78.5    Anxiety F41.9    Hypokalemia E87.6    Tinnitus of both ears H93.13    HIV exposure Z20.6    Diverticula of colon K57.30    RUQ abdominal pain R10.11    Vitamin D deficiency E55.9    Hyperlipidemia E78.5    Snoring R06.83    SEFERINO on CPAP G47.33, Z99.89    Prediabetes R73.03    Obesity, morbid ([address] E66.01    Nonrheumatic mitral valve regurgitation I34.0     Patient Active Problem List    Diagnosis Date Noted    Nonrheumatic mitral valve regurgitation 02/06/2020    Obesity, morbid ([address] 05/18/2018    Prediabetes 03/01/2017    SEFERINO on CPAP 11/03/2016    Snoring 07/11/2016    Hyperlipidemia 11/21/2013    Vitamin D deficiency 10/26/2012    HIV exposure 09/14/2012    Diverticula of colon 09/14/2012    RUQ abdominal pain 09/14/2012    Tinnitus of both ears 06/27/2011    HTN (hypertension) 04/24/2010    Hyperlipidemia 04/24/2010    Anxiety 04/24/2010    Hypokalemia 04/24/2010     Current Outpatient Medications   Medication Sig Dispense Refill    pravastatin (PRAVACHOL) 40 mg tablet TAKE 1 TABLET BY MOUTH EVERY DAY EVERY NIGHT 90 Tab 1    metFORMIN ER (GLUCOPHAGE XR) 500 mg tablet TAKE 1 TAB BY MOUTH DAILY (WITH DINNER). 90 Tab 1    losartan (COZAAR) 100 mg tablet TAKE 1 TABLET BY MOUTH EVERY DAY 90 Tab 1    citalopram (CELEXA) 10 mg tablet TAKE 1/2 TABLET BY MOUTH ONCE DAILY 45 Tab 3    Cholecalciferol, Vitamin D3, (VITAMIN D3) 2,000 unit cap 1 cap daily   30 Cap prn    CALCIUM CITRATE/VITAMIN D3 (CITRACAL + D PO) Take  by mouth daily.  MULTIVITAMINS (MULTIVITAMIN PO) Take 1 Tab by mouth daily.        Allergies   Allergen Reactions    Hydrochlorothiazide Other (comments)     TINGLING IN BACK OF HEAD    Norvasc [Amlodipine] Other (comments)     Tingling in back of head     Past Medical History:   Diagnosis Date    Allergy     Anxiety 4/24/2010    Essential hypertension     HTN (hypertension) 4/24/2010    Hyperlipemia 4/24/2010    Hypokalemia 4/24/2010    Scoliosis      Past Surgical History:   Procedure Laterality Date    HX APPENDECTOMY  1979    HX HYSTERECTOMY  05/07/08    partial     Family History   Problem Relation Age of Onset    Hypertension Mother     Glaucoma Mother     Hypertension Father     Stroke Brother     Heart Disease Maternal Aunt         chf    Heart Failure Maternal Aunt         MI    Diabetes Maternal Uncle     Heart Failure Other         MI    Stroke Other     Colon Cancer Other      Social History     Tobacco Use    Smoking status: Never Smoker    Smokeless tobacco: Never Used   Substance Use Topics    Alcohol use: No     Alcohol/week: 0.0 standard drinks       Review of Systems   Constitutional: Positive for diaphoresis. HENT: Negative for sore throat. Eyes: Negative for blurred vision. Respiratory: Negative for shortness of breath. Cardiovascular: Negative for chest pain. Gastrointestinal: Negative for abdominal pain, nausea and vomiting. Genitourinary: Negative. Neurological: Negative for dizziness and weakness. Objective:   No flowsheet data found.      [INSTRUCTIONS:  \"[x]\" Indicates a positive item  \"[]\" Indicates a negative item  -- DELETE ALL ITEMS NOT EXAMINED]    Constitutional: [x] Appears well-developed and well-nourished [x] No apparent distress      [] Abnormal -     Mental status: [x] Alert and awake  [x] Oriented to person/place/time [x] Able to follow commands    [] Abnormal -     Eyes:   EOM    [x]  Normal    [] Abnormal -   Sclera  [x]  Normal    [] Abnormal -          Discharge [x]  None visible   [] Abnormal -     HENT: [x] Normocephalic, atraumatic  [] Abnormal -   [x] Mouth/Throat: Mucous membranes are moist    External Ears [x] Normal  [] Abnormal -    Neck: [x] No visualized mass [] Abnormal -     Pulmonary/Chest: [x] Respiratory effort normal   [x] No visualized signs of difficulty breathing or respiratory distress        [] Abnormal -      Musculoskeletal:   [x] Normal gait with no signs of ataxia         [x] Normal range of motion of neck        [] Abnormal -     Neurological:        [x] No Facial Asymmetry (Cranial nerve 7 motor function) (limited exam due to video visit)          [x] No gaze palsy        [] Abnormal -          Skin:        [x] No significant exanthematous lesions or discoloration noted on facial skin         [] Abnormal -            Psychiatric:       [x] Normal Affect [] Abnormal -        [x] No Hallucinations      We discussed the expected course, resolution and complications of the diagnosis(es) in detail. Medication risks, benefits, costs, interactions, and alternatives were discussed as indicated. I advised her to contact the office if her condition worsens, changes or fails to improve as anticipated. She expressed understanding with the diagnosis(es) and plan. Rd Hooks, who was evaluated through a patient-initiated, synchronous (real-time) audio-video encounter, and/or her healthcare decision maker, is aware that it is a billable service, with coverage as determined by her insurance carrier. She provided verbal consent to proceed: Yes, and patient identification was verified. It was conducted pursuant to the emergency declaration under the [address] [place] [address] authority and the [employer] Act. A caregiver was present when appropriate. Ability to conduct physical exam was limited. I was at home. The patient was at home.       Lynn Baron NP

## 2021-01-12 NOTE — PROGRESS NOTES
Vito Hudson presents today for   Chief Complaint   Patient presents with    Follow-up     1 month    Diabetes    Hypertension    Palpitations    Results     discuss lab results       Vito Hudson preferred language for health care discussion is english/other. Is someone accompanying this pt? no    Is the patient using any DME equipment during [address]? no    Depression Screening:  3 most recent PHQ Screens 1/12/2021   PHQ Not Done -   Little interest or pleasure in doing things Not at all   Feeling down, depressed, irritable, or hopeless Not at all   Total Score PHQ 2 0       Learning Assessment:  Learning Assessment 1/12/2021   PRIMARY LEARNER Patient   HIGHEST LEVEL OF EDUCATION - PRIMARY LEARNER  GRADUATED HIGH SCHOOL OR GED   BARRIERS PRIMARY LEARNER NONE   CO-LEARNER CAREGIVER No   PRIMARY LANGUAGE ENGLISH    NEED No   LEARNER PREFERENCE PRIMARY DEMONSTRATION   ANSWERED BY patient   RELATIONSHIP SELF       Abuse Screening:  Abuse Screening Questionnaire 1/12/2021   Do you ever feel afraid of your partner? N   Are you in a relationship with someone who physically or mentally threatens you? N   Is it safe for you to go home? Y       Generalized Anxiety  No flowsheet data found. Health Maintenance Due   Topic Date Due    Eye Exam Retinal or Dilated  01/22/1975    Shingrix Vaccine Age 50> (1 of 2) 01/22/2015    PAP AKA CERVICAL CYTOLOGY  03/01/2020   . Health Maintenance reviewed and discussed and ordered per Provider. Coordination of Care:  1. Have you been to the ER, urgent care clinic since your last visit? Hospitalized since your last visit? no    2. Have you seen or consulted any other health care providers outside of the [address] [place] [address] since your last visit? Include any pap smears or colon screening. no      Advance Directive:  1. Do you have an advance directive in place?  Patient Reply:no

## 2021-02-04 ENCOUNTER — OFFICE VISIT (OUTPATIENT)
Dept: CARDIOLOGY CLINIC | Age: 56
End: 2021-02-04
Payer: COMMERCIAL

## 2021-02-04 VITALS
TEMPERATURE: 96.6 F | WEIGHT: 221 LBS | OXYGEN SATURATION: 100 % | DIASTOLIC BLOOD PRESSURE: 77 MMHG | HEIGHT: 62 IN | SYSTOLIC BLOOD PRESSURE: 134 MMHG | BODY MASS INDEX: 40.67 KG/M2 | HEART RATE: 59 BPM

## 2021-02-04 DIAGNOSIS — G47.33 OSA (OBSTRUCTIVE SLEEP APNEA): ICD-10-CM

## 2021-02-04 DIAGNOSIS — I10 ESSENTIAL HYPERTENSION: ICD-10-CM

## 2021-02-04 DIAGNOSIS — I34.0 NONRHEUMATIC MITRAL VALVE REGURGITATION: Primary | ICD-10-CM

## 2021-02-04 DIAGNOSIS — R00.2 PALPITATIONS: ICD-10-CM

## 2021-02-04 DIAGNOSIS — E78.5 HYPERLIPIDEMIA, UNSPECIFIED HYPERLIPIDEMIA TYPE: ICD-10-CM

## 2021-02-04 PROCEDURE — 99214 OFFICE O/P EST MOD 30 MIN: CPT | Performed by: INTERNAL MEDICINE

## 2021-02-04 RX ORDER — BLACK COHOSH ROOT 540 MG
CAPSULE ORAL
COMMUNITY
End: 2022-02-24 | Stop reason: ALTCHOICE

## 2021-02-04 NOTE — PROGRESS NOTES
HISTORY OF PRESENT ILLNESS  Ami Gomez is a 64 y.o. female. 10/2019  Patient is seen today for new patient evaluation. She is referred here for palpitation. She has a history of hypertension, hyperlipidemia. Valvular Heart Disease  The history is provided by the patient. This is a chronic problem. The problem occurs constantly. The problem has not changed since onset. Hypertension  The history is provided by the patient. This is a chronic problem. The problem occurs constantly. The problem has not changed since onset. Palpitations   The history is provided by the patient. This is a recurrent problem. Episode onset: 2 months. The problem has been gradually worsening. The problem occurs every several days. The problem is associated with nothing. Pertinent negatives include no fever, no claudication, no orthopnea, no PND, no nausea, no vomiting, no dizziness, no weakness, no cough, no hemoptysis and no sputum production. Her past medical history is significant for hypertension. Review of Systems   Constitutional: Negative for chills and fever. HENT: Negative for nosebleeds. Eyes: Negative for blurred vision and double vision. Respiratory: Negative for cough, hemoptysis, sputum production and wheezing. Cardiovascular: Positive for palpitations. Negative for orthopnea, claudication, leg swelling and PND. Gastrointestinal: Negative for heartburn, nausea and vomiting. Musculoskeletal: Negative for myalgias. Skin: Negative for rash. Neurological: Negative for dizziness and weakness. Endo/Heme/Allergies: Does not bruise/bleed easily.      Family History   Problem Relation Age of Onset    Hypertension Mother     Glaucoma Mother     Hypertension Father     Stroke Brother     Heart Disease Maternal Aunt         chf    Heart Failure Maternal Aunt         MI    Diabetes Maternal Uncle     Heart Failure Other         MI    Stroke Other     Colon Cancer Other        Past Medical History:   Diagnosis Date    Allergy     Anxiety 4/24/2010    Essential hypertension     HTN (hypertension) 4/24/2010    Hyperlipemia 4/24/2010    Hypokalemia 4/24/2010    Scoliosis        Past Surgical History:   Procedure Laterality Date    HX APPENDECTOMY  1979    HX HYSTERECTOMY  05/07/08    partial       Social History     Tobacco Use    Smoking status: Never Smoker    Smokeless tobacco: Never Used   Substance Use Topics    Alcohol use: No     Alcohol/week: 0.0 standard drinks       Allergies   Allergen Reactions    Hydrochlorothiazide Other (comments)     TINGLING IN BACK OF HEAD    Norvasc [Amlodipine] Other (comments)     Tingling in back of head       Prior to Admission medications    Medication Sig Start Date End Date Taking? Authorizing Provider   black cohosh 540 mg cap Take  by mouth. Yes Provider, Historical   pravastatin (PRAVACHOL) 40 mg tablet TAKE 1 TABLET BY MOUTH EVERY DAY EVERY NIGHT 12/14/20  Yes Rafael GARDNER NP   metFORMIN ER (GLUCOPHAGE XR) 500 mg tablet TAKE 1 TAB BY MOUTH DAILY (WITH DINNER). 12/14/20  Yes Rafael GARDNER NP   losartan (COZAAR) 100 mg tablet TAKE 1 TABLET BY MOUTH EVERY DAY 11/9/20  Yes Rafael GARDNER NP   citalopram (CELEXA) 10 mg tablet TAKE 1/2 TABLET BY MOUTH ONCE DAILY 6/22/20  Yes Rafael GARDNER NP   Cholecalciferol, Vitamin D3, (VITAMIN D3) 2,000 unit cap 1 cap daily   10/26/12  Yes Bushra Carvajal MD   CALCIUM CITRATE/VITAMIN D3 (CITRACAL + D PO) Take  by mouth daily. 5/24/10  Yes Provider, Historical   MULTIVITAMINS (MULTIVITAMIN PO) Take 1 Tab by mouth daily. Yes Provider, Historical         Visit Vitals  /77 (BP 1 Location: Left upper arm, BP Patient Position: Sitting, BP Cuff Size: Adult)   Pulse (!) 59   Temp (!) 96.6 °F (35.9 °C) (Temporal)   Ht 5' 2\" (1.575 m)   Wt 100.2 kg (221 lb)   LMP  (LMP Unknown)   SpO2 100%   BMI 40.42 kg/m²         Physical Exam   Constitutional: She is oriented to person, place, and time.  She appears well-developed and well-nourished. HENT:   Head: Normocephalic and atraumatic. Eyes: Conjunctivae are normal.   Neck: Neck supple. No JVD present. No tracheal deviation present. No thyromegaly present. Cardiovascular: Normal rate and regular rhythm. PMI is not displaced. Exam reveals no gallop, no S3 and no decreased pulses. No murmur heard. Pulmonary/Chest: No respiratory distress. She has no wheezes. She has no rales. She exhibits no tenderness. Abdominal: Soft. There is no abdominal tenderness. Musculoskeletal:         General: No edema. Neurological: She is alert and oriented to person, place, and time. Skin: Skin is warm. Psychiatric: She has a normal mood and affect. Ms. Salina Corado has a reminder for a \"due or due soon\" health maintenance. I have asked that she contact her primary care provider for follow-up on this health maintenance. I have personally reviewed patient's records available from hospital and other providers and incorporated findings in patient care. Notes,lab,old ekg,vascular study  I Have personally reviewed recent relevant labs available and discussed with patient  Interpretation Summary 11/2019       · Left Ventricle: Normal cavity size, wall thickness, systolic function (ejection fraction normal) and diastolic function. Estimated left ventricular ejection fraction is 56 - 60%. · Right Ventricle: Normal right ventricular size and function. · Mitral Valve: Mild mitral valve regurgitation is present. · Tricuspid Valve: Mild tricuspid valve regurgitation is present. · Pulmonary Artery: There is no evidence of pulmonary hypertension. 11/2019  Event monitor-sinus rhythm no significant arrhythmia     I Have personally reviewed recent relevant labs available and discussed with patient  For 2020-CBC, BMP, LFT, lipid, TSH  12/2020-CBC, BMP, LFT lipid and TSH  EKG-12/2020-sinus rhythm    Assessment         ICD-10-CM ICD-9-CM    1.  Nonrheumatic mitral valve regurgitation  I34.0 424.0     Mild stable continue treatment monitor   2. SEFERINO (obstructive sleep apnea)  G47.33 327.23     Continue current treatment monitor continue diet and exercise and weight loss   3. Essential hypertension  I10 401.9     Stable continue therapy monitor   4. Hyperlipidemia, unspecified hyperlipidemia type  E78.5 272.4     Continue current treatment. Follow-up lab with PCP   5. Palpitations  R00.2 785.1     Recent increase in palpitation. Monitor on beta-blocker   8/2020  Cardiac status stable. Symptoms are improving. Now with new onset diabetes on treatment. 2/2021  Patient with recent increase in palpitation that are improving we will continue to monitor clinically. Event monitor last year did not show any significant arrhythmia. Blood pressure stable. With dietary modification she has seen significant weight loss  There are no discontinued medications. No orders of the defined types were placed in this encounter. Follow-up and Dispositions    · Return in about 6 months (around 8/4/2021).

## 2021-02-04 NOTE — PROGRESS NOTES
1. Have you been to the ER, urgent care clinic since your last visit? Hospitalized since your last visit? No    2. Have you seen or consulted any other health care providers outside of the [address] [place] [address] since your last visit? Include any pap smears or colon screening.  Yes Where: PCP Routine

## 2021-05-17 ENCOUNTER — TELEPHONE (OUTPATIENT)
Dept: FAMILY MEDICINE CLINIC | Age: 56
End: 2021-05-17

## 2021-05-17 NOTE — TELEPHONE ENCOUNTER
Left message for the patient to call the office to schedule an appointment. Patient called and said that she has that she was to have an appointment with you tomorrow but I did not see her on your schedule or any labs ordered. The last time she was seen was in Jan. If you would like for me to add her on in a slot let me know or I can give her your next available.  Please advise

## 2021-05-18 DIAGNOSIS — E78.5 HYPERLIPIDEMIA, UNSPECIFIED HYPERLIPIDEMIA TYPE: Primary | ICD-10-CM

## 2021-05-18 DIAGNOSIS — E11.9 CONTROLLED TYPE 2 DIABETES MELLITUS WITHOUT COMPLICATION, WITHOUT LONG-TERM CURRENT USE OF INSULIN (HCC): ICD-10-CM

## 2021-05-18 DIAGNOSIS — I10 ESSENTIAL HYPERTENSION: ICD-10-CM

## 2021-06-05 DIAGNOSIS — E11.9 CONTROLLED TYPE 2 DIABETES MELLITUS WITHOUT COMPLICATION, WITHOUT LONG-TERM CURRENT USE OF INSULIN (HCC): ICD-10-CM

## 2021-06-05 DIAGNOSIS — E78.5 HYPERLIPIDEMIA, UNSPECIFIED HYPERLIPIDEMIA TYPE: ICD-10-CM

## 2021-06-08 RX ORDER — PRAVASTATIN SODIUM 40 MG/1
TABLET ORAL
Qty: 90 TABLET | Refills: 1 | Status: SHIPPED | OUTPATIENT
Start: 2021-06-08 | End: 2021-11-30

## 2021-06-08 RX ORDER — METFORMIN HYDROCHLORIDE 500 MG/1
500 TABLET, EXTENDED RELEASE ORAL
Qty: 90 TABLET | Refills: 1 | Status: SHIPPED | OUTPATIENT
Start: 2021-06-08 | End: 2021-11-30

## 2021-07-30 RX ORDER — LOSARTAN POTASSIUM 100 MG/1
TABLET ORAL
Qty: 90 TABLET | Refills: 0 | Status: SHIPPED | OUTPATIENT
Start: 2021-07-30 | End: 2021-10-25

## 2021-09-13 ENCOUNTER — OFFICE VISIT (OUTPATIENT)
Dept: CARDIOLOGY CLINIC | Age: 56
End: 2021-09-13
Payer: COMMERCIAL

## 2021-09-13 VITALS
WEIGHT: 227 LBS | TEMPERATURE: 98.7 F | HEIGHT: 62 IN | OXYGEN SATURATION: 99 % | SYSTOLIC BLOOD PRESSURE: 135 MMHG | DIASTOLIC BLOOD PRESSURE: 69 MMHG | BODY MASS INDEX: 41.77 KG/M2 | RESPIRATION RATE: 18 BRPM | HEART RATE: 66 BPM

## 2021-09-13 DIAGNOSIS — G47.33 OSA (OBSTRUCTIVE SLEEP APNEA): ICD-10-CM

## 2021-09-13 DIAGNOSIS — I34.0 NONRHEUMATIC MITRAL VALVE REGURGITATION: Primary | ICD-10-CM

## 2021-09-13 DIAGNOSIS — E78.5 HYPERLIPIDEMIA, UNSPECIFIED HYPERLIPIDEMIA TYPE: ICD-10-CM

## 2021-09-13 DIAGNOSIS — I10 ESSENTIAL HYPERTENSION: ICD-10-CM

## 2021-09-13 PROCEDURE — 99214 OFFICE O/P EST MOD 30 MIN: CPT | Performed by: INTERNAL MEDICINE

## 2021-09-13 NOTE — PROGRESS NOTES
HISTORY OF PRESENT ILLNESS  Jhoana Curiel is a 64 y.o. female. 10/2019  Patient is seen today for new patient evaluation. She is referred here for palpitation. She has a history of hypertension, hyperlipidemia. Valvular Heart Disease  The history is provided by the patient. This is a chronic problem. The problem occurs constantly. The problem has not changed since onset. Hypertension  The history is provided by the patient. This is a chronic problem. The problem occurs constantly. The problem has not changed since onset. Palpitations   The history is provided by the patient. This is a recurrent problem. Episode onset: 2 months. The problem has been gradually worsening. The problem occurs every several days. The problem is associated with nothing. Pertinent negatives include no fever, no claudication, no orthopnea, no PND, no nausea, no vomiting, no dizziness, no weakness, no cough, no hemoptysis and no sputum production. Her past medical history is significant for hypertension. Follow-up        Review of Systems   Constitutional: Negative for chills and fever. HENT: Negative for nosebleeds. Eyes: Negative for blurred vision and double vision. Respiratory: Negative for cough, hemoptysis, sputum production and wheezing. Cardiovascular: Positive for palpitations. Negative for orthopnea, claudication, leg swelling and PND. Gastrointestinal: Negative for heartburn, nausea and vomiting. Musculoskeletal: Negative for myalgias. Skin: Negative for rash. Neurological: Negative for dizziness and weakness. Endo/Heme/Allergies: Does not bruise/bleed easily.      Family History   Problem Relation Age of Onset    Hypertension Mother     Glaucoma Mother     Hypertension Father     Stroke Brother     Heart Disease Maternal Aunt         chf    Heart Failure Maternal Aunt         MI    Diabetes Maternal Uncle     Heart Failure Other         MI    Stroke Other     Colon Cancer Other Past Medical History:   Diagnosis Date    Allergy     Anxiety 4/24/2010    Essential hypertension     HTN (hypertension) 4/24/2010    Hyperlipemia 4/24/2010    Hypokalemia 4/24/2010    Scoliosis        Past Surgical History:   Procedure Laterality Date    HX APPENDECTOMY  1979    HX HYSTERECTOMY  05/07/08    partial       Social History     Tobacco Use    Smoking status: Never Smoker    Smokeless tobacco: Never Used   Substance Use Topics    Alcohol use: No     Alcohol/week: 0.0 standard drinks       Allergies   Allergen Reactions    Hydrochlorothiazide Other (comments)     TINGLING IN BACK OF HEAD    Norvasc [Amlodipine] Other (comments)     Tingling in back of head       Prior to Admission medications    Medication Sig Start Date End Date Taking? Authorizing Provider   losartan (COZAAR) 100 mg tablet TAKE 1 TABLET BY MOUTH EVERY DAY 7/30/21  Yes Óscar GARDNER NP   metFORMIN ER (GLUCOPHAGE XR) 500 mg tablet TAKE 1 TAB BY MOUTH DAILY (WITH DINNER). 6/8/21  Yes Ada Lock NP   pravastatin (PRAVACHOL) 40 mg tablet TAKE 1 TABLET BY MOUTH EVERY DAY EVERY NIGHT 6/8/21  Yes Óscar GARDNER NP   black cohosh 540 mg cap Take  by mouth. Yes Provider, Historical   citalopram (CELEXA) 10 mg tablet TAKE 1/2 TABLET BY MOUTH ONCE DAILY 6/22/20  Yes Óscar GARDNER NP   Cholecalciferol, Vitamin D3, (VITAMIN D3) 2,000 unit cap 1 cap daily   10/26/12  Yes José Luis Fan MD   CALCIUM CITRATE/VITAMIN D3 (CITRACAL + D PO) Take  by mouth daily. 5/24/10  Yes Provider, Historical   MULTIVITAMINS (MULTIVITAMIN PO) Take 1 Tab by mouth daily.    Yes Provider, Historical         Visit Vitals  /69 (BP 1 Location: Left upper arm, BP Patient Position: Sitting, BP Cuff Size: Large adult)   Pulse 66   Temp 98.7 °F (37.1 °C) (Temporal)   Resp 18   Ht 5' 2\" (1.575 m)   Wt 103 kg (227 lb)   LMP  (LMP Unknown)   SpO2 99%   BMI 41.52 kg/m²         Physical Exam  Constitutional:       Appearance: She is well-developed. HENT:      Head: Normocephalic and atraumatic. Eyes:      Conjunctiva/sclera: Conjunctivae normal.   Neck:      Thyroid: No thyromegaly. Vascular: No JVD. Trachea: No tracheal deviation. Cardiovascular:      Rate and Rhythm: Normal rate and regular rhythm. Chest Wall: PMI is not displaced. Pulses: No decreased pulses. Heart sounds: No murmur heard. No gallop. No S3 sounds. Pulmonary:      Effort: No respiratory distress. Breath sounds: No wheezing or rales. Chest:      Chest wall: No tenderness. Abdominal:      Palpations: Abdomen is soft. Tenderness: There is no abdominal tenderness. Musculoskeletal:      Cervical back: Neck supple. Skin:     General: Skin is warm. Neurological:      Mental Status: She is alert and oriented to person, place, and time. Ms. Darnell Del Real has a reminder for a \"due or due soon\" health maintenance. I have asked that she contact her primary care provider for follow-up on this health maintenance. I have personally reviewed patient's records available from hospital and other providers and incorporated findings in patient care. Notes,lab,old ekg,vascular study  I Have personally reviewed recent relevant labs available and discussed with patient  Interpretation Summary 11/2019       · Left Ventricle: Normal cavity size, wall thickness, systolic function (ejection fraction normal) and diastolic function. Estimated left ventricular ejection fraction is 56 - 60%. · Right Ventricle: Normal right ventricular size and function. · Mitral Valve: Mild mitral valve regurgitation is present. · Tricuspid Valve: Mild tricuspid valve regurgitation is present. · Pulmonary Artery: There is no evidence of pulmonary hypertension.         11/2019  Event monitorsinus rhythm no significant arrhythmia     I Have personally reviewed recent relevant labs available and discussed with patient  For 2020CBC, BMP, LFT, lipid, TSH  12/2020CBC, BMP, LFT lipid and TSH  EKG12/2020sinus rhythm    Assessment         ICD-10-CM ICD-9-CM    1. Nonrheumatic mitral valve regurgitation  I34.0 424.0     Stable continue treatment monitor   2. SEFERINO (obstructive sleep apnea)  G47.33 327.23     Continue treatment monitor   3. Essential hypertension  I10 401.9     Stable monitor   4. Hyperlipidemia, unspecified hyperlipidemia type  E78.5 272.4     Continue treatment lab with PCP   8/2020  Cardiac status stable. Symptoms are improving. Now with new onset diabetes on treatment. 2/2021  Patient with recent increase in palpitation that are improving we will continue to monitor clinically. Event monitor last year did not show any significant arrhythmia. Blood pressure stable. With dietary modification she has seen significant weight loss  9/2021  Cardiac status stable. Continue current treatment. Dietary modification      There are no discontinued medications. No orders of the defined types were placed in this encounter. Follow-up and Dispositions    · Return in about 6 months (around 3/13/2022).

## 2021-09-13 NOTE — PROGRESS NOTES
Chief Complaint   Patient presents with    Follow-up     6 month follow up      1. Have you been to the ER, urgent care clinic since your last visit? Hospitalized since your last visit? No    2. Have you seen or consulted any other health care providers outside of the [address] [place] [address] since your last visit? Include any pap smears or colon screening.  No

## 2021-09-27 ENCOUNTER — HOSPITAL ENCOUNTER (OUTPATIENT)
Dept: LAB | Age: 56
Discharge: HOME | End: 2021-09-27

## 2021-09-27 LAB — XX-LABCORP SPECIMEN COL,LCBCF: NORMAL

## 2021-09-27 PROCEDURE — 99001 SPECIMEN HANDLING PT-LAB: CPT

## 2021-09-29 ENCOUNTER — OFFICE VISIT (OUTPATIENT)
Dept: FAMILY MEDICINE CLINIC | Age: 56
End: 2021-09-29
Payer: COMMERCIAL

## 2021-09-29 VITALS
DIASTOLIC BLOOD PRESSURE: 77 MMHG | OXYGEN SATURATION: 100 % | TEMPERATURE: 98.3 F | SYSTOLIC BLOOD PRESSURE: 119 MMHG | HEIGHT: 62 IN | BODY MASS INDEX: 42.29 KG/M2 | RESPIRATION RATE: 20 BRPM | HEART RATE: 63 BPM | WEIGHT: 229.8 LBS

## 2021-09-29 DIAGNOSIS — Z71.89 COUNSELING ON HEALTH PROMOTION AND DISEASE PREVENTION: ICD-10-CM

## 2021-09-29 DIAGNOSIS — Z71.2 ENCOUNTER TO DISCUSS TEST RESULTS: ICD-10-CM

## 2021-09-29 DIAGNOSIS — Z23 NEEDS FLU SHOT: ICD-10-CM

## 2021-09-29 DIAGNOSIS — E11.9 CONTROLLED TYPE 2 DIABETES MELLITUS WITHOUT COMPLICATION, WITHOUT LONG-TERM CURRENT USE OF INSULIN (HCC): ICD-10-CM

## 2021-09-29 DIAGNOSIS — F41.8 DEPRESSION WITH ANXIETY: ICD-10-CM

## 2021-09-29 DIAGNOSIS — Z00.00 WELL ADULT EXAM: Primary | ICD-10-CM

## 2021-09-29 DIAGNOSIS — G47.30 SLEEP APNEA, UNSPECIFIED TYPE: ICD-10-CM

## 2021-09-29 DIAGNOSIS — I10 ESSENTIAL HYPERTENSION: ICD-10-CM

## 2021-09-29 LAB
ALBUMIN SERPL-MCNC: 4.3 G/DL (ref 3.8–4.9)
ALBUMIN/GLOB SERPL: 1.7 {RATIO} (ref 1.2–2.2)
ALP SERPL-CCNC: 100 IU/L (ref 44–121)
ALT SERPL-CCNC: 11 IU/L (ref 0–32)
AST SERPL-CCNC: 19 IU/L (ref 0–40)
BILIRUB SERPL-MCNC: <0.2 MG/DL (ref 0–1.2)
BUN SERPL-MCNC: 11 MG/DL (ref 6–24)
BUN/CREAT SERPL: 18 (ref 9–23)
CALCIUM SERPL-MCNC: 9.6 MG/DL (ref 8.7–10.2)
CHLORIDE SERPL-SCNC: 102 MMOL/L (ref 96–106)
CHOLEST SERPL-MCNC: 156 MG/DL (ref 100–199)
CO2 SERPL-SCNC: 24 MMOL/L (ref 20–29)
CREAT SERPL-MCNC: 0.61 MG/DL (ref 0.57–1)
EST. AVERAGE GLUCOSE BLD GHB EST-MCNC: 134 MG/DL
GLOBULIN SER CALC-MCNC: 2.5 G/DL (ref 1.5–4.5)
GLUCOSE SERPL-MCNC: 83 MG/DL (ref 65–99)
HBA1C MFR BLD: 6.3 % (ref 4.8–5.6)
HDLC SERPL-MCNC: 55 MG/DL
IMP & REVIEW OF LAB RESULTS: NORMAL
LDLC SERPL CALC-MCNC: 91 MG/DL (ref 0–99)
POTASSIUM SERPL-SCNC: 4.4 MMOL/L (ref 3.5–5.2)
PROT SERPL-MCNC: 6.8 G/DL (ref 6–8.5)
SODIUM SERPL-SCNC: 141 MMOL/L (ref 134–144)
TRIGL SERPL-MCNC: 49 MG/DL (ref 0–149)
VLDLC SERPL CALC-MCNC: 10 MG/DL (ref 5–40)

## 2021-09-29 PROCEDURE — 90686 IIV4 VACC NO PRSV 0.5 ML IM: CPT | Performed by: NURSE PRACTITIONER

## 2021-09-29 PROCEDURE — 99396 PREV VISIT EST AGE 40-64: CPT | Performed by: NURSE PRACTITIONER

## 2021-09-29 PROCEDURE — 90471 IMMUNIZATION ADMIN: CPT | Performed by: NURSE PRACTITIONER

## 2021-09-29 RX ORDER — CITALOPRAM 10 MG/1
TABLET ORAL
Qty: 45 TABLET | Refills: 3 | Status: CANCELLED | OUTPATIENT
Start: 2021-09-29

## 2021-09-29 NOTE — PROGRESS NOTES
Subjective:   64 y.o. female for Well Woman Check. Her gyne and breast care is done elsewhere by her Ob-Gyne physician. Not interested in a Pap at this time. Patient Active Problem List   Diagnosis Code    HTN (hypertension) I10    Anxiety F41.9    Hypokalemia E87.6    Tinnitus of both ears H93.13    HIV exposure Z20.6    Diverticula of colon K57.30    RUQ abdominal pain R10.11    Vitamin D deficiency E55.9    Hyperlipidemia E78.5    Snoring R06.83    SEFERINO on CPAP G47.33, Z99.89    Prediabetes R73.03    Obesity, morbid ([address] E66.01    Nonrheumatic mitral valve regurgitation I34.0     Patient Active Problem List    Diagnosis Date Noted    Nonrheumatic mitral valve regurgitation 02/06/2020    Obesity, morbid ([address] 05/18/2018    Prediabetes 03/01/2017    SEFERINO on CPAP 11/03/2016    Snoring 07/11/2016    Hyperlipidemia 11/21/2013    Vitamin D deficiency 10/26/2012    HIV exposure 09/14/2012    Diverticula of colon 09/14/2012    RUQ abdominal pain 09/14/2012    Tinnitus of both ears 06/27/2011    HTN (hypertension) 04/24/2010    Anxiety 04/24/2010    Hypokalemia 04/24/2010     Current Outpatient Medications   Medication Sig Dispense Refill    losartan (COZAAR) 100 mg tablet TAKE 1 TABLET BY MOUTH EVERY DAY 90 Tablet 0    metFORMIN ER (GLUCOPHAGE XR) 500 mg tablet TAKE 1 TAB BY MOUTH DAILY (WITH DINNER). 90 Tablet 1    pravastatin (PRAVACHOL) 40 mg tablet TAKE 1 TABLET BY MOUTH EVERY DAY EVERY NIGHT 90 Tablet 1    black cohosh 540 mg cap Take  by mouth.  citalopram (CELEXA) 10 mg tablet TAKE 1/2 TABLET BY MOUTH ONCE DAILY 45 Tab 3    Cholecalciferol, Vitamin D3, (VITAMIN D3) 2,000 unit cap 1 cap daily   30 Cap prn    CALCIUM CITRATE/VITAMIN D3 (CITRACAL + D PO) Take  by mouth daily.  MULTIVITAMINS (MULTIVITAMIN PO) Take 1 Tab by mouth daily.        Allergies   Allergen Reactions    Hydrochlorothiazide Other (comments)     TINGLING IN BACK OF HEAD    Norvasc [Amlodipine] Other (comments)     Tingling in back of head     Past Medical History:   Diagnosis Date    Allergy     Anxiety 4/24/2010    Essential hypertension     HTN (hypertension) 4/24/2010    Hyperlipemia 4/24/2010    Hypokalemia 4/24/2010    Scoliosis      Past Surgical History:   Procedure Laterality Date    HX APPENDECTOMY  1979    HX HYSTERECTOMY  05/07/08    partial     Family History   Problem Relation Age of Onset    Hypertension Mother     Glaucoma Mother     Hypertension Father     Stroke Brother     Heart Disease Maternal Aunt         chf    Heart Failure Maternal Aunt         MI    Diabetes Maternal Uncle     Heart Failure Other         MI    Stroke Other     Colon Cancer Other      Social History     Tobacco Use    Smoking status: Never Smoker    Smokeless tobacco: Never Used   Substance Use Topics    Alcohol use: No     Alcohol/week: 0.0 standard drinks        Lab Results   Component Value Date/Time    Cholesterol, total 156 09/27/2021 12:00 AM    HDL Cholesterol 55 09/27/2021 12:00 AM    LDL, calculated 91 09/27/2021 12:00 AM    LDL, calculated 97 04/10/2020 08:54 AM    Triglyceride 49 09/27/2021 12:00 AM    CHOL/HDL Ratio 3.2 04/10/2020 08:54 AM     Lab Results   Component Value Date/Time    Sodium 141 09/27/2021 12:00 AM    Potassium 4.4 09/27/2021 12:00 AM    Chloride 102 09/27/2021 12:00 AM    CO2 24 09/27/2021 12:00 AM    Anion gap 6 04/10/2020 08:54 AM    Glucose 83 09/27/2021 12:00 AM    BUN 11 09/27/2021 12:00 AM    Creatinine 0.61 09/27/2021 12:00 AM    BUN/Creatinine ratio 18 09/27/2021 12:00 AM    GFR est  09/27/2021 12:00 AM    GFR est non- 09/27/2021 12:00 AM    Calcium 9.6 09/27/2021 12:00 AM    Bilirubin, total <0.2 09/27/2021 12:00 AM    ALT (SGPT) 11 09/27/2021 12:00 AM    Alk.  phosphatase 100 09/27/2021 12:00 AM    Protein, total 6.8 09/27/2021 12:00 AM    Albumin 4.3 09/27/2021 12:00 AM    Globulin 3.4 04/10/2020 08:54 AM    A-G Ratio 1.7 09/27/2021 12:00 AM Lab Results   Component Value Date/Time    Hemoglobin A1c 6.3 (H) 09/27/2021 12:00 AM    Hemoglobin A1c (POC) 5.7 09/15/2020 09:00 AM       3 most recent PHQ Screens 9/29/2021   PHQ Not Done -   Little interest or pleasure in doing things Not at all   Feeling down, depressed, irritable, or hopeless Not at all   Total Score PHQ 2 0       ROS: Feeling generally well. She does have some fatigue. No TIA's or unusual headaches, no dysphagia. No prolonged cough. No dyspnea or chest pain on exertion. No abdominal pain, change in bowel habits, black or bloody stools. No urinary tract symptoms. No new or unusual musculoskeletal symptoms. Specific concerns today: She would like to stop her Celexa. She denies any desire to hurt or harm herself or others. She has not had the medication in months and she feels well. She is in need of her supplies for her CPAP but the doctor that she was seeing left. She does have some fatigue and she is sure that this is because she has not used her CPAP. She uses her CPAP off and on.     Objective: The patient appears well, alert, oriented x 3, in no distress. Visit Vitals  /77 (BP 1 Location: Left upper arm, BP Patient Position: Sitting, BP Cuff Size: Large adult)   Pulse 63   Temp 98.3 °F (36.8 °C) (Oral)   Resp 20   Ht 5' 2\" (1.575 m)   Wt 229 lb 12.8 oz (104.2 kg)   LMP  (LMP Unknown)   SpO2 100%   BMI 42.03 kg/m²     ENT normal.  Neck supple. No adenopathy or thyromegaly. RHONDA. Lungs are clear, good air entry, no wheezes, rhonchi or rales. S1 and S2 normal, no murmurs, regular rate and rhythm. Abdomen soft without tenderness, guarding, mass or organomegaly. Extremities show no edema, normal peripheral pulses. Neurological is normal, no focal findings. Breast and Pelvic exams are deferred. Assessment/Plan:   Well Woman  Eye exam discussed. lose weight, increase physical activity  Depression and anxiety seems to be in remission. Take medications as prescribed. Foot exam declined at this visit - agrees to complete at a future visit. ICD-10-CM ICD-9-CM    1. Well adult exam  Z00.00 V70.0    2. Depression with anxiety  F41.8 300.4    3. Encounter to discuss test results  Z71.2 V65.49    4. Essential hypertension  I10 401.9    5. Counseling on health promotion and disease prevention  Z71.89 V65.49    6. Controlled type 2 diabetes mellitus without complication, without long-term current use of insulin (HCC)  E11.9 250.00 MICROALBUMIN, UR, RAND W/ MICROALB/CREAT RATIO   7. Sleep apnea, unspecified type  G47.30 780.57 SLEEP MEDICINE REFERRAL   8.  Needs flu shot  Z23 V04.81 INFLUENZA VIRUS VAC QUAD,SPLIT,PRESV FREE SYRINGE IM   Chapis Foote DNP, FNP-C

## 2021-09-29 NOTE — PROGRESS NOTES
Candy Yanez presents today for   Chief Complaint   Patient presents with    Complete Physical     no pap       Candy Yanez preferred language for health care discussion is english/other. Is someone accompanying this pt? no    Is the patient using any DME equipment during [address]? no    Depression Screening:  3 most recent PHQ Screens 9/29/2021   PHQ Not Done -   Little interest or pleasure in doing things Not at all   Feeling down, depressed, irritable, or hopeless Not at all   Total Score PHQ 2 0       Learning Assessment:  Learning Assessment 1/12/2021   PRIMARY LEARNER Patient   HIGHEST LEVEL OF EDUCATION - PRIMARY LEARNER  GRADUATED HIGH SCHOOL OR GED   BARRIERS PRIMARY LEARNER NONE   CO-LEARNER CAREGIVER No   PRIMARY LANGUAGE ENGLISH    NEED No   LEARNER PREFERENCE PRIMARY DEMONSTRATION   ANSWERED BY patient   RELATIONSHIP SELF       Abuse Screening:  Abuse Screening Questionnaire 1/12/2021   Do you ever feel afraid of your partner? N   Are you in a relationship with someone who physically or mentally threatens you? N   Is it safe for you to go home? Y       Generalized Anxiety  No flowsheet data found. Health Maintenance Due   Topic Date Due    Eye Exam Retinal or Dilated  Never done    COVID-19 Vaccine (2 - Moderna 2-dose series) 06/11/2021    Flu Vaccine (1) 09/01/2021    Foot Exam Q1  09/15/2021    MICROALBUMIN Q1  09/15/2021   . Health Maintenance reviewed and discussed and ordered per Provider. Coordination of Care:  1. Have you been to the ER, urgent care clinic since your last visit? Hospitalized since your last visit? no    2. Have you seen or consulted any other health care providers outside of the [address] [place] [address] since your last visit? Include any pap smears or colon screening.  no      Advance Directive:  Discussed 1/12/21

## 2021-09-29 NOTE — PROGRESS NOTES
Shavona Meter 1965 female who presents for routine immunizations. Patient denies any symptoms , reactions or allergies that would exclude them from being immunized today. Risks and adverse reactions were discussed and the VIS was given to them. All questions were addressed. Order placed for flu vaccine,  per Sign Order from [facility] with read back. Patient was observed for 10 min post injection. There were no reactions observed.     Jose Has

## 2021-10-25 RX ORDER — LOSARTAN POTASSIUM 100 MG/1
TABLET ORAL
Qty: 90 TABLET | Refills: 0 | Status: SHIPPED | OUTPATIENT
Start: 2021-10-25 | End: 2022-01-18

## 2021-10-28 LAB — SARS-COV-2, NAA: NOT DETECTED

## 2021-11-30 DIAGNOSIS — E78.5 HYPERLIPIDEMIA, UNSPECIFIED HYPERLIPIDEMIA TYPE: ICD-10-CM

## 2021-11-30 DIAGNOSIS — E11.9 CONTROLLED TYPE 2 DIABETES MELLITUS WITHOUT COMPLICATION, WITHOUT LONG-TERM CURRENT USE OF INSULIN (HCC): ICD-10-CM

## 2021-11-30 RX ORDER — PRAVASTATIN SODIUM 40 MG/1
TABLET ORAL
Qty: 90 TABLET | Refills: 1 | Status: SHIPPED | OUTPATIENT
Start: 2021-11-30 | End: 2022-06-10 | Stop reason: SDUPTHER

## 2021-11-30 RX ORDER — METFORMIN HYDROCHLORIDE 500 MG/1
500 TABLET, EXTENDED RELEASE ORAL
Qty: 90 TABLET | Refills: 1 | Status: SHIPPED | OUTPATIENT
Start: 2021-11-30 | End: 2022-06-10 | Stop reason: SDUPTHER

## 2022-01-26 ENCOUNTER — OFFICE VISIT (OUTPATIENT)
Dept: FAMILY MEDICINE CLINIC | Age: 57
End: 2022-01-26
Payer: COMMERCIAL

## 2022-01-26 VITALS
WEIGHT: 238.4 LBS | BODY MASS INDEX: 43.87 KG/M2 | TEMPERATURE: 98.2 F | HEIGHT: 62 IN | SYSTOLIC BLOOD PRESSURE: 140 MMHG | RESPIRATION RATE: 16 BRPM | HEART RATE: 65 BPM | DIASTOLIC BLOOD PRESSURE: 82 MMHG | OXYGEN SATURATION: 100 %

## 2022-01-26 DIAGNOSIS — E11.9 CONTROLLED TYPE 2 DIABETES MELLITUS WITHOUT COMPLICATION, WITHOUT LONG-TERM CURRENT USE OF INSULIN (HCC): ICD-10-CM

## 2022-01-26 DIAGNOSIS — I10 ESSENTIAL HYPERTENSION: Primary | ICD-10-CM

## 2022-01-26 DIAGNOSIS — E78.5 HYPERLIPIDEMIA, UNSPECIFIED HYPERLIPIDEMIA TYPE: ICD-10-CM

## 2022-01-26 PROCEDURE — 99212 OFFICE O/P EST SF 10 MIN: CPT | Performed by: NURSE PRACTITIONER

## 2022-01-26 NOTE — PROGRESS NOTES
Jenae Hardy presents today for   Chief Complaint   Patient presents with    Diabetes    Hypertension       Jenae Hardy preferred language for health care discussion is english/other. Is someone accompanying this pt? no    Is the patient using any DME equipment during [address]? no    Depression Screening:  3 most recent PHQ Screens 1/26/2022   PHQ Not Done -   Little interest or pleasure in doing things Not at all   Feeling down, depressed, irritable, or hopeless Not at all   Total Score PHQ 2 0       Learning Assessment:  Learning Assessment 1/26/2022   PRIMARY LEARNER Patient   HIGHEST LEVEL OF EDUCATION - PRIMARY LEARNER  GRADUATED HIGH SCHOOL OR GED   BARRIERS PRIMARY LEARNER NONE   CO-LEARNER CAREGIVER No   PRIMARY LANGUAGE ENGLISH    NEED No   LEARNER PREFERENCE PRIMARY DEMONSTRATION   ANSWERED BY patient   RELATIONSHIP SELF       Abuse Screening:  Abuse Screening Questionnaire 1/26/2022   Do you ever feel afraid of your partner? N   Are you in a relationship with someone who physically or mentally threatens you? N   Is it safe for you to go home? Y       Generalized Anxiety  No flowsheet data found. Health Maintenance Due   Topic Date Due    Eye Exam Retinal or Dilated  Never done    Foot Exam Q1  09/15/2021    MICROALBUMIN Q1  09/15/2021    COVID-19 Vaccine (3 - Booster for Moderna series) 10/14/2021   . Health Maintenance reviewed and discussed and ordered per Provider. Coordination of Care:  1. Have you been to the ER, urgent care clinic since your last visit? Hospitalized since your last visit? Yes, urgent care    2. Have you seen or consulted any other health care providers outside of the [address] [place] [address] since your last visit? Include any pap smears or colon screening. no      Advance Directive:  1. Do you have an advance directive in place?  Patient Reply:no

## 2022-01-26 NOTE — PROGRESS NOTES
HPI  Nino Membreno is a 62 y.o. female  Chief Complaint   Patient presents with    Diabetes    Hypertension     Patient presents for follow up for diabetes and hypertension. Patient states that she has been checking her blood sugar daily at home with readings in the 110s. She reports that she checks her blood pressure daily with readings in the 130s/60s-70s. Patient states that she has not had a foot examination since 2020 and has been to the eye doctor within the last two years. She reports taking all medications as prescribed without complications. Denies chest pain, palpations, shortness of breath or increased swelling. She reports that her eating habits have changed during the holidays and her birthday and that she would like to get back on track. She denies having an exercise regime. Past Medical History  Past Medical History:   Diagnosis Date    Allergy     Anxiety 4/24/2010    Essential hypertension     HTN (hypertension) 4/24/2010    Hyperlipemia 4/24/2010    Hypokalemia 4/24/2010    Scoliosis        Surgical History  Past Surgical History:   Procedure Laterality Date    HX APPENDECTOMY  1979    HX HYSTERECTOMY  05/07/08    partial        Medications  Current Outpatient Medications   Medication Sig Dispense Refill    losartan (COZAAR) 100 mg tablet TAKE 1 TABLET BY MOUTH EVERY DAY 90 Tablet 1    pravastatin (PRAVACHOL) 40 mg tablet TAKE 1 TABLET BY MOUTH EVERY DAY EVERY NIGHT 90 Tablet 1    metFORMIN ER (GLUCOPHAGE XR) 500 mg tablet TAKE 1 TAB BY MOUTH DAILY (WITH DINNER). 90 Tablet 1    Cholecalciferol, Vitamin D3, (VITAMIN D3) 2,000 unit cap 1 cap daily   30 Cap prn    CALCIUM CITRATE/VITAMIN D3 (CITRACAL + D PO) Take  by mouth daily.  MULTIVITAMINS (MULTIVITAMIN PO) Take 1 Tab by mouth daily.  black cohosh 540 mg cap Take  by mouth.  (Patient not taking: Reported on 1/26/2022)      citalopram (CELEXA) 10 mg tablet TAKE 1/2 TABLET BY MOUTH ONCE DAILY (Patient not taking: Reported on 1/26/2022) 45 Tab 3       Allergies  Allergies   Allergen Reactions    Hydrochlorothiazide Other (comments)     TINGLING IN BACK OF HEAD    Norvasc [Amlodipine] Other (comments)     Tingling in back of head       Family History  Family History   Problem Relation Age of Onset    Hypertension Mother     Glaucoma Mother     Hypertension Father     Stroke Brother     Heart Disease Maternal Aunt         chf    Heart Failure Maternal Aunt         MI    Diabetes Maternal Uncle     Heart Failure Other         MI    Stroke Other     Colon Cancer Other        Social History  Social History     Socioeconomic History    Marital status:      Spouse name: Not on file    Number of children: Not on file    Years of education: Not on file    Highest education level: Not on file   Occupational History    Not on file   Tobacco Use    Smoking status: Never Smoker    Smokeless tobacco: Never Used   Substance and Sexual Activity    Alcohol use: No     Alcohol/week: 0.0 standard drinks    Drug use: No    Sexual activity: Not on file   Other Topics Concern    Not on file   Social History Narrative    Not on file     Social Determinants of Health     Financial Resource Strain:     Difficulty of Paying Living Expenses: Not on file   Food Insecurity:     Worried About Running Out of Food in the Last Year: Not on file    [place] of Food in the Last Year: Not on file   Transportation Needs:     Lack of Transportation (Medical): Not on file    Lack of Transportation (Non-Medical):  Not on file   Physical Activity:     Days of Exercise per Week: Not on file    Minutes of Exercise per Session: Not on file   Stress:     Feeling of Stress : Not on file   Social Connections:     Frequency of Communication with Friends and Family: Not on file    Frequency of Social Gatherings with Friends and Family: Not on file    Attends Judaism Services: Not on file    Active Member of Clubs or Organizations: Not on file    Attends Club or Organization Meetings: Not on file    Marital Status: Not on file   Intimate Partner Violence:     Fear of Current or Ex-Partner: Not on file    Emotionally Abused: Not on file    Physically Abused: Not on file    Sexually Abused: Not on file   Housing Stability:     Unable to Pay for Housing in the Last Year: Not on file    Number of [place] in the Last Year: Not on file    Unstable Housing in the Last Year: Not on file       Problem List  Patient Active Problem List   Diagnosis Code    HTN (hypertension) I10    Anxiety F41.9    Hypokalemia E87.6    Tinnitus of both ears H93.13    HIV exposure Z20.6    Diverticula of colon K57.30    RUQ abdominal pain R10.11    Vitamin D deficiency E55.9    Hyperlipidemia E78.5    Snoring R06.83    SEFERINO on CPAP G47.33, Z99.89    Prediabetes R73.03    Obesity, morbid ([address] E66.01    Nonrheumatic mitral valve regurgitation I34.0       Review of Systems  Review of Systems   Constitutional: Negative. HENT: Negative for congestion, ear discharge, ear pain and sore throat. Eyes: Negative for blurred vision and double vision. Respiratory: Negative for cough and shortness of breath. Cardiovascular: Negative for chest pain, palpitations and leg swelling. Gastrointestinal: Negative for abdominal pain, blood in stool, constipation, diarrhea, nausea and vomiting. Genitourinary: Negative for hematuria. Musculoskeletal: Negative for falls. Skin: Negative. Neurological: Negative for dizziness, loss of consciousness and headaches. Psychiatric/Behavioral: Negative for depression, substance abuse and suicidal ideas.        Vital Signs  Vitals:    01/26/22 0908 01/26/22 0919   BP: (!) 137/54 (!) 140/82   Pulse: 65    Resp: 16    Temp: 98.2 °F (36.8 °C)    TempSrc: Oral    SpO2: 100%    Weight: 238 lb 6.4 oz (108.1 kg)    Height: 5' 2\" (1.575 m)    PainSc:   0 - No pain        Physical Exam  Physical Exam  Constitutional: Appearance: Normal appearance. She is normal weight. HENT:      Head: Normocephalic. Nose: Nose normal.      Mouth/Throat:      Mouth: Mucous membranes are moist.      Pharynx: Oropharynx is clear. Eyes:      Extraocular Movements: Extraocular movements intact. Conjunctiva/sclera: Conjunctivae normal.      Pupils: Pupils are equal, round, and reactive to light. Cardiovascular:      Rate and Rhythm: Normal rate and regular rhythm. Pulses: Normal pulses. Heart sounds: Normal heart sounds. Pulmonary:      Effort: Pulmonary effort is normal.      Breath sounds: Normal breath sounds. Abdominal:      General: Bowel sounds are normal.   Musculoskeletal:         General: Normal range of motion. Cervical back: Normal range of motion. Right lower leg: No edema. Left lower leg: No edema. Neurological:      General: No focal deficit present. Mental Status: She is alert and oriented to person, place, and time. Psychiatric:         Mood and Affect: Mood normal.         Behavior: Behavior normal.         Thought Content: Thought content normal.         Judgment: Judgment normal.         Diagnostics  Orders Placed This Encounter    REFERRAL TO PODIATRY     Referral Priority:   Routine     Referral Type:   Consultation     Referral Reason:   Specialty Services Required     Referred to Provider:   Shirlene Cornell DPM     Requested Specialty:   Podiatry     Number of Visits Requested:   1       Results  Results for orders placed or performed during the hospital encounter of 09/27/21   [facility] SPECIMEN COL   Result Value Ref Range    [facility] SPEC[facility]. Specimens collected/sent to [employer]. Please direct inquiries to ([phone]). Assessment and Plan  Diagnoses and all orders for this visit:    1. Essential hypertension    2.  Controlled type 2 diabetes mellitus without complication, without long-term current use of insulin ([facility]  -     REFERRAL TO PODIATRY    3. Hyperlipidemia, unspecified hyperlipidemia type    4. BMI 40.0-44.9, adult [facility])      Educated patient on DASH diet and exercise. Encouraged lifestyle changes for weight loss. Discussed complications of uncontrolled diabetes. Referred to podiatrist.  Advised to schedule eye appointment. Educated to take medications as prescribed. Continue to check blood sugar and blood pressure daily at home. After care summary printed and reviewed with patient. Plan reviewed with patient. Questions answered. Patient verbalized understanding of plan and is in agreement with plan. Patient to follow up in one month or earlier if symptoms worsen. Follow-up and Dispositions    · Return in about 1 month (around 2/26/2022) for PAP.        MARIO Delaney

## 2022-01-26 NOTE — PATIENT INSTRUCTIONS
A Healthy Lifestyle: Care Instructions  Your Care Instructions     A healthy lifestyle can help you feel good, stay at a healthy weight, and have plenty of energy for both work and play. A healthy lifestyle is something you can share with your whole family. A healthy lifestyle also can lower your risk for serious health problems, such as high blood pressure, heart disease, and diabetes. You can follow a few steps listed below to improve your health and the health of your family. Follow-up care is a key part of your treatment and safety. Be sure to make and go to all appointments, and call your doctor if you are having problems. It's also a good idea to know your test results and keep a list of the medicines you take. How can you care for yourself at home? · Do not eat too much sugar, fat, or fast foods. You can still have dessert and treats now and then. The goal is moderation. · Start small to improve your eating habits. Pay attention to portion sizes, drink less juice and soda pop, and eat more fruits and vegetables. ? Eat a healthy amount of food. A 3-ounce serving of meat, for example, is about the size of a deck of cards. Fill the rest of your plate with vegetables and whole grains. ? Limit the amount of soda and sports drinks you have every day. Drink more water when you are thirsty. ? Eat plenty of fruits and vegetables every day. Have an apple or some carrot sticks as an afternoon snack instead of a candy bar. Try to have fruits and/or vegetables at every meal.  · Make exercise part of your daily routine. You may want to start with simple activities, such as walking, bicycling, or slow swimming. Try to be active 30 to 60 minutes every day. You do not need to do all 30 to 60 minutes all at once. For example, you can exercise 3 times a day for 10 or 20 minutes.  Moderate exercise is safe for most people, but it is always a good idea to talk to your doctor before starting an exercise program.  · Keep moving. [employer] the lawn, work in the garden, or [employer]. Take the stairs instead of the elevator at work. · If you smoke, quit. People who smoke have an increased risk for heart attack, stroke, cancer, and other lung illnesses. Quitting is hard, but there are ways to boost your chance of quitting tobacco for good. ? Use nicotine gum, patches, or lozenges. ? Ask your doctor about stop-smoking programs and medicines. ? Keep trying. In addition to reducing your risk of diseases in the future, you will notice some benefits soon after you stop using tobacco. If you have shortness of breath or asthma symptoms, they will likely get better within a few weeks after you quit. · Limit how much alcohol you drink. Moderate amounts of alcohol (up to 2 drinks a day for men, 1 drink a day for women) are okay. But drinking too much can lead to liver problems, high blood pressure, and other health problems. Family health  If you have a family, there are many things you can do together to improve your health. · Eat meals together as a family as often as possible. · Eat healthy foods. This includes fruits, vegetables, lean meats and dairy, and whole grains. · Include your family in your fitness plan. Most people think of activities such as jogging or tennis as the way to fitness, but there are many ways you and your family can be more active. Anything that makes you breathe hard and gets your heart pumping is exercise. Here are some tips:  ? Walk to do errands or to take your child to school or the bus.  ? Go for a family bike ride after dinner instead of watching TV. Where can you learn more? Go to http://www.gray.com/  Enter B228 in the search box to learn more about \"A Healthy Lifestyle: Care Instructions. \"  Current as of: June 16, 2021               Content Version: 13.0  © 7034-3998 [employer]wise, Incorporated.    Care instructions adapted under license by [employer] (which disclaims liability or warranty for this information). If you have questions about a medical condition or this instruction, always ask your healthcare professional. [address] any warranty or liability for your use of this information.

## 2022-02-24 ENCOUNTER — HOSPITAL ENCOUNTER (OUTPATIENT)
Dept: LAB | Age: 57
Discharge: HOME | End: 2022-02-24
Payer: COMMERCIAL

## 2022-02-24 ENCOUNTER — OFFICE VISIT (OUTPATIENT)
Dept: FAMILY MEDICINE CLINIC | Age: 57
End: 2022-02-24
Payer: COMMERCIAL

## 2022-02-24 VITALS
HEART RATE: 77 BPM | HEIGHT: 62 IN | DIASTOLIC BLOOD PRESSURE: 84 MMHG | TEMPERATURE: 97.9 F | OXYGEN SATURATION: 100 % | WEIGHT: 240 LBS | SYSTOLIC BLOOD PRESSURE: 128 MMHG | BODY MASS INDEX: 44.16 KG/M2 | RESPIRATION RATE: 17 BRPM

## 2022-02-24 DIAGNOSIS — E11.9 CONTROLLED TYPE 2 DIABETES MELLITUS WITHOUT COMPLICATION, WITHOUT LONG-TERM CURRENT USE OF INSULIN (HCC): ICD-10-CM

## 2022-02-24 DIAGNOSIS — I10 ESSENTIAL HYPERTENSION: ICD-10-CM

## 2022-02-24 DIAGNOSIS — E78.5 HYPERLIPIDEMIA, UNSPECIFIED HYPERLIPIDEMIA TYPE: ICD-10-CM

## 2022-02-24 DIAGNOSIS — Z01.419 WELL WOMAN EXAM: Primary | ICD-10-CM

## 2022-02-24 DIAGNOSIS — F41.8 DEPRESSION WITH ANXIETY: ICD-10-CM

## 2022-02-24 PROCEDURE — 88175 CYTOPATH C/V AUTO FLUID REDO: CPT

## 2022-02-24 PROCEDURE — 99396 PREV VISIT EST AGE 40-64: CPT | Performed by: NURSE PRACTITIONER

## 2022-02-24 PROCEDURE — 87624 HPV HI-RISK TYP POOLED RSLT: CPT

## 2022-02-24 NOTE — PROGRESS NOTES
Subjective:   62 y.o. female for Well Woman Check. No LMP recorded (lmp unknown). Patient has had a hysterectomy. Patient presents for a pap smear. Patient reports that she had a partial hysterectomy in her 35s. She has not had a pap in a few years and would like one. She denies any vaginal bleeding, discharge, pain or odor. Patient denies any breast pain or nipple drainage. Patient reports she has no problems with urination or bowel movements. Patient reports that she is  and has one sexual partner. She reports that her and her partner are not sexually active due to his history of HIV. She reports that she has been taking her medications as prescribed. She is checking her blood pressure daily with readings in the 120-130s/80s. Denies chest pain, palpitations, shortness of breath or increased swelling. Patient reports that she is checking her blood sugar daily with readings in the 110s. She reports that she has not made changes to her diet or exercise. Patient reports increased stress this week due an issue with her mother and family friend. Denies thoughts of harm to herself or others. She has had to take sometime off of work because of the situation. She reports her mother's friend should be moving out around March 8th and she anticipates her stress, anxiety and depression to improve. Social History: not sexually active. Pertinent past medical hstory: hypertension, no history of HTN, DVT, CAD, DM, liver disease, migraines or smoking.   3 most recent PHQ Screens 2/24/2022   PHQ Not Done -   Little interest or pleasure in doing things Not at all   Feeling down, depressed, irritable, or hopeless More than half the days   Total Score PHQ 2 2       Patient Active Problem List   Diagnosis Code    HTN (hypertension) I10    Anxiety F41.9    Hypokalemia E87.6    Tinnitus of both ears H93.13    HIV exposure Z20.6    Diverticula of colon K57.30    RUQ abdominal pain R10.11    Vitamin D deficiency E55.9  Hyperlipidemia E78.5    Snoring R06.83    SEFERINO on CPAP G47.33, Z99.89    Prediabetes R73.03    Obesity, morbid (HCC) E66.01    Nonrheumatic mitral valve regurgitation I34.0     Patient Active Problem List    Diagnosis Date Noted    Nonrheumatic mitral valve regurgitation 02/06/2020    Obesity, morbid ([address] 05/18/2018    Prediabetes 03/01/2017    SEFERINO on CPAP 11/03/2016    Snoring 07/11/2016    Hyperlipidemia 11/21/2013    Vitamin D deficiency 10/26/2012    HIV exposure 09/14/2012    Diverticula of colon 09/14/2012    RUQ abdominal pain 09/14/2012    Tinnitus of both ears 06/27/2011    HTN (hypertension) 04/24/2010    Anxiety 04/24/2010    Hypokalemia 04/24/2010     Current Outpatient Medications   Medication Sig Dispense Refill    losartan (COZAAR) 100 mg tablet TAKE 1 TABLET BY MOUTH EVERY DAY 90 Tablet 1    pravastatin (PRAVACHOL) 40 mg tablet TAKE 1 TABLET BY MOUTH EVERY DAY EVERY NIGHT 90 Tablet 1    metFORMIN ER (GLUCOPHAGE XR) 500 mg tablet TAKE 1 TAB BY MOUTH DAILY (WITH DINNER). 90 Tablet 1    Cholecalciferol, Vitamin D3, (VITAMIN D3) 2,000 unit cap 1 cap daily   30 Cap prn    CALCIUM CITRATE/VITAMIN D3 (CITRACAL + D PO) Take  by mouth daily.  MULTIVITAMINS (MULTIVITAMIN PO) Take 1 Tab by mouth daily.        Allergies   Allergen Reactions    Hydrochlorothiazide Other (comments)     TINGLING IN BACK OF HEAD    Norvasc [Amlodipine] Other (comments)     Tingling in back of head     Past Medical History:   Diagnosis Date    Allergy     Anxiety 4/24/2010    Essential hypertension     HTN (hypertension) 4/24/2010    Hyperlipemia 4/24/2010    Hypokalemia 4/24/2010    Scoliosis      Past Surgical History:   Procedure Laterality Date    HX APPENDECTOMY  1979    HX HYSTERECTOMY  05/07/08    partial     Family History   Problem Relation Age of Onset    Hypertension Mother     Glaucoma Mother     Hypertension Father     Stroke Brother     Heart Disease Maternal Aunt chf    Heart Failure Maternal Aunt         MI    Diabetes Maternal Uncle     Heart Failure Other         MI    Stroke Other     Colon Cancer Other      Social History     Tobacco Use    Smoking status: Never Smoker    Smokeless tobacco: Never Used   Substance Use Topics    Alcohol use: No     Alcohol/week: 0.0 standard drinks        ROS:  Feeling well. Reports recent anxiety and depression. No dyspnea or chest pain on exertion. No abdominal pain, change in bowel habits, black or bloody stools. No urinary tract symptoms. GYN ROS: no breast pain or new or enlarging lumps on self exam, no vaginal bleeding, no discharge or pelvic pain, no hot flashes. No neurological complaints. Objective:     Visit Vitals  /84 (BP 1 Location: Left upper arm, BP Patient Position: Sitting, BP Cuff Size: Adult)   Pulse 77   Temp 97.9 °F (36.6 °C) (Oral)   Resp 17   Ht 5' 2\" (1.575 m)   Wt 240 lb (108.9 kg)   LMP  (LMP Unknown)   SpO2 100%   BMI 43.90 kg/m²     The patient appears well, alert, oriented x 3, in no distress. ENT normal.  Neck supple. No adenopathy or thyromegaly. RHONDA. Lungs are clear, good air entry, no wheezes, rhonchi or rales. S1 and S2 normal, no murmurs, regular rate and rhythm. Abdomen soft without tenderness, guarding, mass or organomegaly. Extremities show no edema, normal peripheral pulses. Neurological is normal, no focal findings. BREAST EXAM: breasts appear normal, no suspicious masses, no skin or nipple changes or axillary nodes    PELVIC EXAM: normal external genitalia, vulva, vagina. No internal concerns noted. Assessment/Plan:   well woman  mammogram  pap smear  counseled on breast self exam, STD prevention and HIV -PRep discussed. Risk factors and prevention measures reviewed. return annually or prn  reviewed diet, exercise and weight control  cardiovascular risk and specific lipid/LDL goals reviewed  reviewed medications and side effects in detail. Diabetes discussed.    Take medications as prescribed. Offered to restart Celexa and she declined. Discussed the importance of mental health wellness. She is to continue with prayer as this is has been positive and successful with her stress management. She is to contact this office sooner if she feels her stress and depression worsens. Diagnoses and all orders for this visit:    1. Well woman exam    2. Essential hypertension    3. Controlled type 2 diabetes mellitus without complication, without long-term current use of insulin ([facility]    4. Hyperlipidemia, unspecified hyperlipidemia type    5. BMI 40.0-44.9, adult ([address]    6.  Depression with anxiety        Asad Ornelas, ROLF, FNP-C

## 2022-02-24 NOTE — PROGRESS NOTES
Walker Arpan presents today for   Chief Complaint   Patient presents with    Well Woman       Is someone accompanying this pt? no    Is the patient using any DME equipment during OV? no    Depression Screening:  3 most recent PHQ Screens 2/24/2022   PHQ Not Done -   Little interest or pleasure in doing things Not at all   Feeling down, depressed, irritable, or hopeless Not at all   Total Score PHQ 2 0       Learning Assessment:  Learning Assessment 1/26/2022   PRIMARY LEARNER Patient   HIGHEST LEVEL OF EDUCATION - PRIMARY LEARNER  GRADUATED HIGH SCHOOL OR GED   BARRIERS PRIMARY LEARNER NONE   CO-LEARNER CAREGIVER No   PRIMARY LANGUAGE ENGLISH    NEED No   LEARNER PREFERENCE PRIMARY DEMONSTRATION   ANSWERED BY patient   RELATIONSHIP SELF       Fall Risk  No flowsheet data found. ADL  No flowsheet data found. Travel Screening:    Travel Screening     Question   Response    In the last month, have you been in contact with someone who was confirmed or suspected to have Coronavirus / COVID-19? No / Unsure    Have you had a COVID-19 viral test in the last 14 days? No    Do you have any of the following new or worsening symptoms? Have you traveled internationally or domestically in the last month? No      Travel History   Travel since 01/24/22    No documented travel since 01/24/22         Health Maintenance reviewed and discussed and ordered per Provider. Health Maintenance Due   Topic Date Due    Eye Exam Retinal or Dilated  Never done    Foot Exam Q1  09/15/2021    MICROALBUMIN Q1  09/15/2021   . Coordination of Care:  1. Have you been to the ER, urgent care clinic since your last visit? Hospitalized since your last visit? no    2. Have you seen or consulted any other health care providers outside of the [address] [place] [address] since your last visit? Include any pap smears or colon screening.  no

## 2022-03-14 ENCOUNTER — OFFICE VISIT (OUTPATIENT)
Dept: CARDIOLOGY CLINIC | Age: 57
End: 2022-03-14
Payer: COMMERCIAL

## 2022-03-14 VITALS
OXYGEN SATURATION: 100 % | DIASTOLIC BLOOD PRESSURE: 79 MMHG | HEART RATE: 57 BPM | WEIGHT: 238 LBS | HEIGHT: 62 IN | SYSTOLIC BLOOD PRESSURE: 134 MMHG | BODY MASS INDEX: 43.79 KG/M2

## 2022-03-14 DIAGNOSIS — G47.33 OSA (OBSTRUCTIVE SLEEP APNEA): ICD-10-CM

## 2022-03-14 DIAGNOSIS — E78.5 HYPERLIPIDEMIA, UNSPECIFIED HYPERLIPIDEMIA TYPE: ICD-10-CM

## 2022-03-14 DIAGNOSIS — I10 ESSENTIAL HYPERTENSION: Primary | ICD-10-CM

## 2022-03-14 DIAGNOSIS — R00.2 PALPITATIONS: ICD-10-CM

## 2022-03-14 DIAGNOSIS — I34.0 NONRHEUMATIC MITRAL VALVE REGURGITATION: ICD-10-CM

## 2022-03-14 PROCEDURE — 99214 OFFICE O/P EST MOD 30 MIN: CPT | Performed by: INTERNAL MEDICINE

## 2022-03-14 RX ORDER — METOPROLOL SUCCINATE 25 MG/1
25 TABLET, EXTENDED RELEASE ORAL DAILY
Qty: 30 TABLET | Refills: 3 | Status: SHIPPED | OUTPATIENT
Start: 2022-03-14 | End: 2022-06-01

## 2022-03-14 NOTE — PROGRESS NOTES
HISTORY OF PRESENT ILLNESS  Jm Maria is a 62 y.o. female. 10/2019  Patient is seen today for new patient evaluation. She is referred here for palpitation. She has a history of hypertension, hyperlipidemia. 3/2022  Patient seen today for follow-up. She has noted increased palpitation. She is not using CPAP. Denies any chest pain. Follow-up    Valvular Heart Disease  The history is provided by the patient. This is a chronic problem. The problem occurs constantly. The problem has not changed since onset. Hypertension  The history is provided by the patient. This is a chronic problem. The problem occurs constantly. The problem has not changed since onset. Palpitations   The history is provided by the patient. This is a recurrent problem. Episode onset: 2 months. The problem has been gradually worsening. The problem occurs every several days. The problem is associated with nothing. Pertinent negatives include no fever, no claudication, no orthopnea, no PND, no nausea, no vomiting, no dizziness, no weakness, no cough, no hemoptysis and no sputum production. Her past medical history is significant for hypertension. Review of Systems   Constitutional: Negative for chills and fever. HENT: Negative for nosebleeds. Eyes: Negative for blurred vision and double vision. Respiratory: Negative for cough, hemoptysis, sputum production and wheezing. Cardiovascular: Positive for palpitations. Negative for orthopnea, claudication, leg swelling and PND. Gastrointestinal: Negative for heartburn, nausea and vomiting. Musculoskeletal: Negative for myalgias. Skin: Negative for rash. Neurological: Negative for dizziness and weakness. Endo/Heme/Allergies: Does not bruise/bleed easily.      Family History   Problem Relation Age of Onset    Hypertension Mother     Glaucoma Mother     Hypertension Father     Stroke Brother     Heart Disease Maternal Aunt         chf    Heart Failure Maternal Aunt         MI    Diabetes Maternal Uncle     Heart Failure Other         MI    Stroke Other     Colon Cancer Other        Past Medical History:   Diagnosis Date    Allergy     Anxiety 4/24/2010    Essential hypertension     HTN (hypertension) 4/24/2010    Hyperlipemia 4/24/2010    Hypokalemia 4/24/2010    Scoliosis        Past Surgical History:   Procedure Laterality Date    HX APPENDECTOMY  1979    HX HYSTERECTOMY  05/07/08    partial       Social History     Tobacco Use    Smoking status: Never Smoker    Smokeless tobacco: Never Used   Substance Use Topics    Alcohol use: No     Alcohol/week: 0.0 standard drinks       Allergies   Allergen Reactions    Hydrochlorothiazide Other (comments)     TINGLING IN BACK OF HEAD    Norvasc [Amlodipine] Other (comments)     Tingling in back of head       Prior to Admission medications    Medication Sig Start Date End Date Taking? Authorizing Provider   metoprolol succinate (TOPROL-XL) 25 mg XL tablet Take 1 Tablet by mouth daily. 3/14/22  Yes Tank Hou MD   losartan (COZAAR) 100 mg tablet TAKE 1 TABLET BY MOUTH EVERY DAY 1/18/22  Yes Cyndee GARDNER NP   pravastatin (PRAVACHOL) 40 mg tablet TAKE 1 TABLET BY MOUTH EVERY DAY EVERY NIGHT 11/30/21  Yes Cyndee GARDNER NP   metFORMIN ER (GLUCOPHAGE XR) 500 mg tablet TAKE 1 TAB BY MOUTH DAILY (WITH DINNER). 11/30/21  Yes Cyndee GARDNER NP   Cholecalciferol, Vitamin D3, (VITAMIN D3) 2,000 unit cap 1 cap daily   10/26/12  Yes Frank Palma MD   CALCIUM CITRATE/VITAMIN D3 (CITRACAL + D PO) Take  by mouth daily. 5/24/10  Yes Provider, Historical   MULTIVITAMINS (MULTIVITAMIN PO) Take 1 Tab by mouth daily.    Yes Provider, Historical         Visit Vitals  /79 (BP 1 Location: Left upper arm, BP Patient Position: Sitting, BP Cuff Size: Adult long)   Pulse (!) 57   Ht 5' 2\" (1.575 m)   Wt 108 kg (238 lb)   LMP  (LMP Unknown)   SpO2 100%   BMI 43.53 kg/m²         Physical Exam  Constitutional: Appearance: She is well-developed. HENT:      Head: Normocephalic and atraumatic. Eyes:      Conjunctiva/sclera: Conjunctivae normal.   Neck:      Thyroid: No thyromegaly. Vascular: No JVD. Trachea: No tracheal deviation. Cardiovascular:      Rate and Rhythm: Normal rate and regular rhythm. Chest Wall: PMI is not displaced. Pulses: No decreased pulses. Heart sounds: No murmur heard. No gallop. No S3 sounds. Pulmonary:      Effort: No respiratory distress. Breath sounds: No wheezing or rales. Chest:      Chest wall: No tenderness. Abdominal:      Palpations: Abdomen is soft. Tenderness: There is no abdominal tenderness. Musculoskeletal:      Cervical back: Neck supple. Skin:     General: Skin is warm. Neurological:      Mental Status: She is alert and oriented to person, place, and time. Ms. Krishna Walsh has a reminder for a \"due or due soon\" health maintenance. I have asked that she contact her primary care provider for follow-up on this health maintenance. I have personally reviewed patient's records available from hospital and other providers and incorporated findings in patient care. Notes,lab,old ekg,vascular study  I Have personally reviewed recent relevant labs available and discussed with patient  Interpretation Summary 11/2019       · Left Ventricle: Normal cavity size, wall thickness, systolic function (ejection fraction normal) and diastolic function. Estimated left ventricular ejection fraction is 56 - 60%. · Right Ventricle: Normal right ventricular size and function. · Mitral Valve: Mild mitral valve regurgitation is present. · Tricuspid Valve: Mild tricuspid valve regurgitation is present. · Pulmonary Artery: There is no evidence of pulmonary hypertension.         11/2019  Event monitor-sinus rhythm no significant arrhythmia     I Have personally reviewed recent relevant labs available and discussed with patient  For 2020-CBC, BMP, LFT, lipid, TSH  12/2020-CBC, BMP, LFT lipid and TSH  EKG-12/2020-sinus rhythm    Assessment         ICD-10-CM ICD-9-CM    1. Essential hypertension  I10 401.9 CANCELED: AMB POC EKG ROUTINE W/ 12 LEADS, INTER & REP    Stable monitor   2. Nonrheumatic mitral valve regurgitation  I34.0 424.0 ECHO ADULT COMPLETE    Symptoms stable   3. SEFERINO (obstructive sleep apnea)  G47.33 327.23 ECHO ADULT COMPLETE    Has not used CPAP for some time that could be causing her palpitation also   4. Hyperlipidemia, unspecified hyperlipidemia type  E78.5 272.4     Continue treatment lab with PCP   5. Palpitations  R00.2 785.1     Recent increase symptom we will add low-dose of beta-blocker and see if she can tolerate that   8/2020  Cardiac status stable. Symptoms are improving. Now with new onset diabetes on treatment. 2/2021  Patient with recent increase in palpitation that are improving we will continue to monitor clinically. Event monitor last year did not show any significant arrhythmia. Blood pressure stable. With dietary modification she has seen significant weight loss  9/2021  Cardiac status stable. Continue current treatment. Dietary modification  3/2022  Symptoms increased palpitation. Add Toprol 25 mg a day and monitor. Check echo for valvular disease and PA pressure. Depending on symptom improvement consider continuation of Toprol    There are no discontinued medications. Orders Placed This Encounter    ECHO ADULT COMPLETE     Standing Status:   Future     Standing Expiration Date:   3/14/2023     Order Specific Question:   Contrast Enhancement (Bubble Study, Definity, Optison) may be used if criteria listed in established evidence-based protocol has been identified. Answer: Yes    metoprolol succinate (TOPROL-XL) 25 mg XL tablet     Sig: Take 1 Tablet by mouth daily. Dispense:  30 Tablet     Refill:  3       Follow-up and Dispositions    · Return for F/u after tests, Follow-up with [facility].

## 2022-03-14 NOTE — PROGRESS NOTES
1. Have you been to the ER, urgent care clinic since your last visit? Hospitalized since your last visit? No    2. Have you seen or consulted any other health care providers outside of the [address] [place] [address] since your last visit? Include any pap smears or colon screening.       No

## 2022-03-14 NOTE — LETTER
NOTIFICATION RETURN TO WORK / SCHOOL    3/14/2022 11:04 AM    Ms. Mor[phone]      To Whom It May Concern:    Genesis Bajwa is currently under the care of [address] [place] [address],[address]. She will return to work/school on: 3/15/2022  If there are questions or concerns please have the patient contact our office.         Sincerely,      MD Tasneem Macario

## 2022-03-19 PROBLEM — E66.01 OBESITY, MORBID (HCC): Status: ACTIVE | Noted: 2018-05-18

## 2022-03-19 PROBLEM — R73.03 PREDIABETES: Status: ACTIVE | Noted: 2017-03-01

## 2022-03-20 PROBLEM — I34.0 NONRHEUMATIC MITRAL VALVE REGURGITATION: Status: ACTIVE | Noted: 2020-02-06

## 2022-04-25 ENCOUNTER — OFFICE VISIT (OUTPATIENT)
Dept: CARDIOLOGY CLINIC | Age: 57
End: 2022-04-25
Payer: COMMERCIAL

## 2022-04-25 VITALS
HEIGHT: 62 IN | DIASTOLIC BLOOD PRESSURE: 56 MMHG | WEIGHT: 241 LBS | BODY MASS INDEX: 44.35 KG/M2 | OXYGEN SATURATION: 100 % | HEART RATE: 70 BPM | SYSTOLIC BLOOD PRESSURE: 124 MMHG

## 2022-04-25 DIAGNOSIS — I34.0 NONRHEUMATIC MITRAL VALVE REGURGITATION: Primary | Chronic | ICD-10-CM

## 2022-04-25 DIAGNOSIS — E78.5 HYPERLIPIDEMIA, UNSPECIFIED HYPERLIPIDEMIA TYPE: ICD-10-CM

## 2022-04-25 DIAGNOSIS — G47.33 OSA (OBSTRUCTIVE SLEEP APNEA): Chronic | ICD-10-CM

## 2022-04-25 DIAGNOSIS — E66.01 OBESITY, MORBID (HCC): ICD-10-CM

## 2022-04-25 DIAGNOSIS — R00.2 PALPITATIONS: ICD-10-CM

## 2022-04-25 DIAGNOSIS — I10 ESSENTIAL HYPERTENSION: ICD-10-CM

## 2022-04-25 PROCEDURE — 99214 OFFICE O/P EST MOD 30 MIN: CPT | Performed by: NURSE PRACTITIONER

## 2022-04-25 NOTE — PROGRESS NOTES
1. Have you been to the ER, urgent care clinic since your last visit? Hospitalized since your last visit?     no  2. Have you seen or consulted any other health care providers outside of the [address] [place] [address] since your last visit? Include any pap smears or colon screening.       No

## 2022-04-25 NOTE — PROGRESS NOTES
HISTORY OF PRESENT ILLNESS  Renetta Montgomery is a 62 y.o. female. 10/2019  Patient is seen today for new patient evaluation. She is referred here for palpitation. She has a history of hypertension, hyperlipidemia. 3/2022  Patient seen today for follow-up. She has noted increased palpitation. She is not using CPAP. Denies any chest pain. 4/2022  Patient seen in f/u for palpitations and echocardiogram results. She reports symptoms are improved with addition of beta blocker. She denies chest pain, shortness of breath or edema. Follow-up  The history is provided by the patient and medical records. Valvular Heart Disease  The history is provided by the patient. This is a chronic problem. The problem occurs constantly. The problem has not changed since onset. Hypertension  The history is provided by the patient. This is a chronic problem. The problem occurs constantly. The problem has not changed since onset. Palpitations   The history is provided by the patient. This is a recurrent problem. Episode onset: 2 months. The problem has been gradually worsening. The problem occurs every several days. The problem is associated with nothing. Pertinent negatives include no fever, no claudication, no orthopnea, no PND, no nausea, no vomiting, no dizziness, no weakness, no cough, no hemoptysis and no sputum production. Her past medical history is significant for hypertension. Review of Systems   Constitutional: Negative for chills and fever. HENT: Negative for nosebleeds. Eyes: Negative for blurred vision and double vision. Respiratory: Negative for cough, hemoptysis, sputum production and wheezing. Cardiovascular: Positive for palpitations. Negative for orthopnea, claudication, leg swelling and PND. Gastrointestinal: Negative for heartburn, nausea and vomiting. Musculoskeletal: Negative for myalgias. Skin: Negative for rash. Neurological: Negative for dizziness and weakness. Endo/Heme/Allergies: Does not bruise/bleed easily. Family History   Problem Relation Age of Onset    Hypertension Mother     Glaucoma Mother     Hypertension Father     Stroke Brother     Heart Disease Maternal Aunt         chf    Heart Failure Maternal Aunt         MI    Diabetes Maternal Uncle     Heart Failure Other         MI    Stroke Other     Colon Cancer Other        Past Medical History:   Diagnosis Date    Allergy     Anxiety 4/24/2010    Essential hypertension     HTN (hypertension) 4/24/2010    Hyperlipemia 4/24/2010    Hypokalemia 4/24/2010    Scoliosis        Past Surgical History:   Procedure Laterality Date    HX APPENDECTOMY  1979    HX HYSTERECTOMY  05/07/08    partial       Social History     Tobacco Use    Smoking status: Never Smoker    Smokeless tobacco: Never Used   Substance Use Topics    Alcohol use: No     Alcohol/week: 0.0 standard drinks       Allergies   Allergen Reactions    Hydrochlorothiazide Other (comments)     TINGLING IN BACK OF HEAD    Norvasc [Amlodipine] Other (comments)     Tingling in back of head       Prior to Admission medications    Medication Sig Start Date End Date Taking? Authorizing Provider   metoprolol succinate (TOPROL-XL) 25 mg XL tablet Take 1 Tablet by mouth daily. 3/14/22  Yes Radu Hernandez MD   losartan (COZAAR) 100 mg tablet TAKE 1 TABLET BY MOUTH EVERY DAY 1/18/22  Yes Fabricio GARDNER NP   pravastatin (PRAVACHOL) 40 mg tablet TAKE 1 TABLET BY MOUTH EVERY DAY EVERY NIGHT 11/30/21  Yes Fabricio GARDNER NP   metFORMIN ER (GLUCOPHAGE XR) 500 mg tablet TAKE 1 TAB BY MOUTH DAILY (WITH DINNER). 11/30/21  Yes Fabricio GARDNER NP   Cholecalciferol, Vitamin D3, (VITAMIN D3) 2,000 unit cap 1 cap daily   10/26/12  Yes Keli Mcknight MD   CALCIUM CITRATE/VITAMIN D3 (CITRACAL + D PO) Take  by mouth daily. 5/24/10  Yes Provider, Historical   MULTIVITAMINS (MULTIVITAMIN PO) Take 1 Tab by mouth daily.    Yes Provider, Historical Visit Vitals  BP (!) 124/56   Pulse 70   Ht 5' 2\" (1.575 m)   Wt 109.3 kg (241 lb)   LMP  (LMP Unknown)   SpO2 100%   BMI 44.08 kg/m²         Physical Exam  Constitutional:       Appearance: She is well-developed. HENT:      Head: Normocephalic and atraumatic. Eyes:      Conjunctiva/sclera: Conjunctivae normal.   Neck:      Thyroid: No thyromegaly. Vascular: No JVD. Trachea: No tracheal deviation. Cardiovascular:      Rate and Rhythm: Normal rate and regular rhythm. Chest Wall: PMI is not displaced. Pulses: No decreased pulses. Heart sounds: No murmur heard. No gallop. No S3 sounds. Pulmonary:      Effort: No respiratory distress. Breath sounds: No wheezing or rales. Chest:      Chest wall: No tenderness. Abdominal:      Palpations: Abdomen is soft. Tenderness: There is no abdominal tenderness. Musculoskeletal:      Cervical back: Neck supple. Skin:     General: Skin is warm. Neurological:      Mental Status: She is alert and oriented to person, place, and time. Ms. Cristal Luciano has a reminder for a \"due or due soon\" health maintenance. I have asked that she contact her primary care provider for follow-up on this health maintenance. I have personally reviewed patient's records available from hospital and other providers and incorporated findings in patient care. Notes,lab,old ekg,vascular study  I Have personally reviewed recent relevant labs available and discussed with patient  Interpretation Summary 11/2019       · Left Ventricle: Normal cavity size, wall thickness, systolic function (ejection fraction normal) and diastolic function. Estimated left ventricular ejection fraction is 56 - 60%. · Right Ventricle: Normal right ventricular size and function. · Mitral Valve: Mild mitral valve regurgitation is present. · Tricuspid Valve: Mild tricuspid valve regurgitation is present.   · Pulmonary Artery: There is no evidence of pulmonary hypertension. Interpretation Summary    Left Ventricle: Normal left ventricular systolic function with a visually estimated EF of 55 - 60%. Left ventricle size is normal. Normal wall thickness. Normal wall motion. Normal diastolic function.   Technical qualifiers: Echo study was technically difficult due to patient's body habitus  Comparison Study Information  Prior Study  There is a prior study available for comparison. Prior study date: 11/6/2019. As compared to the previous study, there are no significant changes. Echo Findings  Left Ventricle Normal left ventricular systolic function with a visually estimated EF of 55 - 60%. Left ventricle size is normal. Normal wall thickness. Normal wall motion. Normal diastolic function. Left Atrium Left atrium size is normal. LA Vol Index A/L is 26 mL/m2. Right Ventricle Right ventricle size is normal. Normal wall thickness. Normal systolic function. Right Atrium Right atrium size is normal.   Aortic Valve Valve structure is normal. No regurgitation. No stenosis. Mitral Valve Valve structure is normal. No regurgitation. No stenosis noted. Tricuspid Valve Valve structure is normal. No regurgitation. No stenosis noted. Pulmonic Valve The pulmonic valve visualization is suboptimal but appears to be functioning normally. Pulmonary Artery Pulmonary hypertension not present. Aorta Normal sized annulus. IVC/Hepatic Veins IVC diameter is less than or equal to 21 mm and decreases greater than 50% during inspiration; therefore the estimated right atrial pressure is normal (~3 mmHg). Pericardium No pericardial effusion. 11/2019  Event monitorsinus rhythm no significant arrhythmia     I Have personally reviewed recent relevant labs available and discussed with patient  For 2020CBC, BMP, LFT, lipid, TSH  12/2020CBC, BMP, LFT lipid and TSH  EKG12/2020sinus rhythm    Assessment         ICD-10-CM ICD-9-CM    1.  Nonrheumatic mitral valve regurgitation  I34.0 424.0     Symptoms stable - no regurg seen on recent echo   2. Essential hypertension  I10 401.9     Stable monitor   3. Hyperlipidemia, unspecified hyperlipidemia type  E78.5 272.4     Continue treatment lab with PCP   4. SEFERINO (obstructive sleep apnea)  G47.33 327.23 REFERRAL TO PULMONARY DISEASE    Has not used CPAP for some time that could be causing her palpitation also   5. Palpitations  R00.2 785.1     Resolved with beta blocker   6. Obesity, morbid ([address]  E66.01 278.01     Diet and exercise encouraged to promote weight loss   8/2020  Cardiac status stable. Symptoms are improving. Now with new onset diabetes on treatment. 2/2021  Patient with recent increase in palpitation that are improving we will continue to monitor clinically. Event monitor last year did not show any significant arrhythmia. Blood pressure stable. With dietary modification she has seen significant weight loss  9/2021  Cardiac status stable. Continue current treatment. Dietary modification  3/2022  Symptoms increased palpitation. Add Toprol 25 mg a day and monitor. Check echo for valvular disease and PA pressure. Depending on symptom improvement consider continuation of Toprol  4/2022  Palpitations have resolved with Toprol XL. Echo reviewed and discussed with patient, with normal LV function, no evidence of pulmonary hypertension. Advised patient to resume wearing CPAP nightly. Will refer to pulmonary for treatment of obstructive sleep apnea. Weight loss efforts encouraged. There are no discontinued medications.     Orders Placed This Encounter    REFERRAL TO PULMONARY DISEASE     Referral Priority:   Routine     Referral Type:   Consultation     Referral Reason:   Specialty Services Required     Referred to Provider:   Olivia Espinosa DO     Requested Specialty:   Pulmonary Disease     Number of Visits Requested:   1       Follow-up and Dispositions    · Return in about 6 months (around 10/25/2022) for Follow up with Dr. Cyril Tello.

## 2022-04-25 NOTE — PATIENT INSTRUCTIONS
Heart-Healthy Diet: Care Instructions  Your Care Instructions     A heart-healthy diet has lots of vegetables, fruits, nuts, beans, and whole grains, and is low in salt. It limits foods that are high in saturated fat, such as meats, cheeses, and fried foods. It may be hard to change your diet, but even small changes can lower your risk of heart attack and heart disease. Follow-up care is a key part of your treatment and safety. Be sure to make and go to all appointments, and call your doctor if you are having problems. It's also a good idea to know your test results and keep a list of the medicines you take. How can you care for yourself at home? Watch your portions  · Use food labels to learn what the recommended servings are for the foods you eat. · Eat only the number of calories you need to stay at a healthy weight. If you need to lose weight, eat fewer calories than your body burns (through exercise and other physical activity). Eat more fruits and vegetables  · Eat a variety of fruit and vegetables every day. Dark green, deep orange, red, or yellow fruits and vegetables are especially good for you. Examples include spinach, carrots, peaches, and berries. · Keep carrots, celery, and other veggies handy for snacks. Buy fruit that is in season and store it where you can see it so that you will be tempted to eat it. · Cook dishes that have a lot of veggies in them, such as stir-fries and soups. Limit saturated fat  · Read food labels, and try to avoid saturated fats. They increase your risk of heart disease. · Use olive or canola oil when you cook. · Bake, broil, grill, or steam foods instead of frying them. · Choose lean meats instead of high-fat meats such as hot dogs and sausages. Cut off all visible fat when you prepare meat. · Eat fish, skinless poultry, and meat alternatives such as soy products instead of high-fat meats.  Soy products, such as tofu, may be especially good for your heart.  · Choose low-fat or fat-free milk and dairy products. Eat foods high in fiber  · Eat a variety of grain products every day. Include whole-grain foods that have lots of fiber and nutrients. Examples of whole-grain foods include oats, whole wheat bread, and brown rice. · Buy whole-grain breads and cereals, instead of white bread or pastries. Limit salt and sodium  · Limit how much salt and sodium you eat to help lower your blood pressure. · Taste food before you salt it. Add only a little salt when you think you need it. With time, your taste buds will adjust to less salt. · Eat fewer snack items, fast foods, and other high-salt, processed foods. Check food labels for the amount of sodium in packaged foods. · Choose low-sodium versions of canned goods (such as soups, vegetables, and beans). Limit sugar  · Limit drinks and foods with added sugar. These include candy, desserts, and soda pop. Limit alcohol  · Limit alcohol to no more than 2 drinks a day for men and 1 drink a day for women. Too much alcohol can cause health problems. When should you call for help? Watch closely for changes in your health, and be sure to contact your doctor if:    · You would like help planning heart-healthy meals. Where can you learn more? Go to http://www.carballo.com/  Enter V137 in the search box to learn more about \"Heart-Healthy Diet: Care Instructions. \"  Current as of: September 8, 2021               Content Version: 13.2  © 2006-2022 [employer]. Care instructions adapted under license by [employer] (which disclaims liability or warranty for this information). If you have questions about a medical condition or this instruction, always ask your healthcare professional. [place] [address] any warranty or liability for your use of this information.

## 2022-05-31 PROBLEM — E11.8 TYPE 2 DIABETES MELLITUS WITH COMPLICATION, WITHOUT LONG-TERM CURRENT USE OF INSULIN (HCC): Status: ACTIVE | Noted: 2022-05-31

## 2022-05-31 NOTE — ASSESSMENT & PLAN NOTE
Hypotensive today but recheck improved  Decrease metoprolol to 12.5 mg daily to see if fatigue and BP improve  Advised to call if palpitations return with decrease of beta blocker and will then decrease Losartan to 50 mg instead  Hydration emphasized  Re-evaluate in 4 weeks 2022

## 2022-05-31 NOTE — PROGRESS NOTES
Chief Complaint   Patient presents with    Diabetes    Cholesterol Problem    Hypertension     Assessment & Plan:     1. Type 2 diabetes mellitus with complication, without long-term current use of insulin ([facility]  Assessment & Plan:  Overdue for A1c recheck, ordered  Continue regimen for now  Orders:  -     METABOLIC PANEL, COMPREHENSIVE; Future  -     HEMOGLOBIN A1C WITH EAG; Future  -     MICROALBUMIN, UR, RAND W/ MICROALB/CREAT RATIO; Future  -     REFERRAL TO OPHTHALMOLOGY  2. Hyperlipidemia associated with type 2 diabetes mellitus ([facility]  Assessment & Plan:  LDL goal <70, repeat lipid panel  Orders:  -     LIPID PANEL; Future  -     METABOLIC PANEL, COMPREHENSIVE; Future  3. Essential hypertension  Assessment & Plan:  Hypotensive today but recheck improved  Decrease metoprolol to 12.5 mg daily to see if fatigue and BP improve  Advised to call if palpitations return with decrease of beta blocker and will then decrease Losartan to 50 mg instead  Hydration emphasized  Re-evaluate in 4 weeks  Orders:  -     METABOLIC PANEL, COMPREHENSIVE; Future  -     CBC WITH AUTOMATED DIFF; Future  -     metoprolol succinate (TOPROL-XL) 25 mg XL tablet; Take 0.5 Tablets by mouth daily. , No Print, Disp-30 Tablet, R-0  4. Encounter for immunization  -     PNEUMOCOCCAL CONJUGATE PCV20, PF (PREVNAR 20)    Follow-up and Dispositions    · Return in about 4 weeks (around 6/29/2022) for diabetes, cholesterol, blood pressure, lab results.        Subjective:     HPI    Type 2 DM-  Home BG readings range from 108-140s  Hypoglycemia:  None  On statin: Pravastatin 40 mg  Comorbid: HLD, HTN morbid obesity  Followed by endocrine: No  Current treatment:  Metformin  mg daily    Hyperlipidemia  Compliant with meds  Comorbid: type 2 DM, HTN, morbid obesity  Current treatment:  Pravastatin 40 mg    Hypertension-  Symptoms:  fatigue  BP readings at home are:  Does not recall  Comorbid:  Type 2 DM, HLD, morbid obesity  Current treatment: metoprolol 25 mg daily, Losartan 100 mg daily  She was started on metoprolol for palpitations at the end of April by cardiology  Denies any dizziness or chest pains    Health Maintenance:  Pneumonia vac- given Prevnar today  Urine micro - ordered  Diabetic foot exam - defer to next appt  Diabetic eye exam - referred    Review of Systems   Constitutional: Positive for malaise/fatigue. Negative for chills and fever. Respiratory: Negative for shortness of breath. Cardiovascular: Negative for chest pain. Neurological: Negative for dizziness. Objective:   /73 (BP 1 Location: Right arm, BP Patient Position: Sitting, BP Cuff Size: Adult)   Pulse 80   Temp 98 °F (36.7 °C) (Oral)   Resp 16   Ht 5' 2\" (1.575 m)   Wt 242 lb (109.8 kg)   LMP  (LMP Unknown)   SpO2 98%   BMI 44.26 kg/m²      Physical Exam  Vitals and nursing note reviewed. Constitutional:       General: She is not in acute distress. Appearance: She is not ill-appearing. HENT:      Head: Normocephalic and atraumatic. Cardiovascular:      Rate and Rhythm: Normal rate and regular rhythm. Heart sounds: No murmur heard. No friction rub. No gallop. Pulmonary:      Effort: Pulmonary effort is normal. No respiratory distress. Breath sounds: No wheezing, rhonchi or rales. Skin:     General: Skin is warm and dry. Neurological:      General: No focal deficit present. Mental Status: She is alert and oriented to person, place, and time. Psychiatric:         Mood and Affect: Mood normal.         Thought Content:  Thought content normal.         Judgment: Judgment normal.        MARIO Tolliver

## 2022-06-01 ENCOUNTER — OFFICE VISIT (OUTPATIENT)
Dept: FAMILY MEDICINE CLINIC | Age: 57
End: 2022-06-01
Payer: COMMERCIAL

## 2022-06-01 VITALS
BODY MASS INDEX: 44.53 KG/M2 | WEIGHT: 242 LBS | HEART RATE: 80 BPM | SYSTOLIC BLOOD PRESSURE: 106 MMHG | TEMPERATURE: 98 F | HEIGHT: 62 IN | RESPIRATION RATE: 16 BRPM | OXYGEN SATURATION: 98 % | DIASTOLIC BLOOD PRESSURE: 73 MMHG

## 2022-06-01 DIAGNOSIS — E11.8 TYPE 2 DIABETES MELLITUS WITH COMPLICATION, WITHOUT LONG-TERM CURRENT USE OF INSULIN (HCC): Primary | ICD-10-CM

## 2022-06-01 DIAGNOSIS — Z23 ENCOUNTER FOR IMMUNIZATION: ICD-10-CM

## 2022-06-01 DIAGNOSIS — I10 ESSENTIAL HYPERTENSION: ICD-10-CM

## 2022-06-01 DIAGNOSIS — E78.5 HYPERLIPIDEMIA ASSOCIATED WITH TYPE 2 DIABETES MELLITUS (HCC): ICD-10-CM

## 2022-06-01 DIAGNOSIS — E11.69 HYPERLIPIDEMIA ASSOCIATED WITH TYPE 2 DIABETES MELLITUS (HCC): ICD-10-CM

## 2022-06-01 PROCEDURE — 90471 IMMUNIZATION ADMIN: CPT | Performed by: NURSE PRACTITIONER

## 2022-06-01 PROCEDURE — 90677 PCV20 VACCINE IM: CPT | Performed by: NURSE PRACTITIONER

## 2022-06-01 PROCEDURE — 99214 OFFICE O/P EST MOD 30 MIN: CPT | Performed by: NURSE PRACTITIONER

## 2022-06-01 RX ORDER — METOPROLOL SUCCINATE 25 MG/1
12.5 TABLET, EXTENDED RELEASE ORAL DAILY
Qty: 30 TABLET | Refills: 0
Start: 2022-06-01 | End: 2022-06-08

## 2022-06-01 NOTE — PROGRESS NOTES
Marquis Dumont presents today for   Chief Complaint   Patient presents with    Diabetes    Cholesterol Problem    Hypertension       Is someone accompanying this pt? no    Is the patient using any DME equipment during OV? no    Depression Screening:  3 most recent PHQ Screens 6/1/2022   PHQ Not Done -   Little interest or pleasure in doing things Not at all   Feeling down, depressed, irritable, or hopeless Not at all   Total Score PHQ 2 0       Learning Assessment:  Learning Assessment 1/26/2022   PRIMARY LEARNER Patient   HIGHEST LEVEL OF EDUCATION - PRIMARY LEARNER  GRADUATED HIGH SCHOOL OR GED   BARRIERS PRIMARY LEARNER NONE   CO-LEARNER CAREGIVER No   PRIMARY LANGUAGE ENGLISH    NEED No   LEARNER PREFERENCE PRIMARY DEMONSTRATION   ANSWERED BY patient   RELATIONSHIP SELF       Fall Risk  No flowsheet data found. ADL  No flowsheet data found. Travel Screening:    Travel Screening     Question   Response    In the last 10 days, have you been in contact with someone who was confirmed or suspected to have Coronavirus/COVID-19? No / Unsure    Have you had a COVID-19 viral test in the last 10 days? No    Do you have any of the following new or worsening symptoms? Have you traveled internationally or domestically in the last month? No      Travel History   Travel since 05/01/22    No documented travel since 05/01/22         Health Maintenance reviewed and discussed and ordered per Provider. Health Maintenance Due   Topic Date Due    Eye Exam Retinal or Dilated  Never done    Foot Exam Q1  09/15/2021    MICROALBUMIN Q1  09/15/2021    Pneumococcal 0-64 years (2 - PCV) 09/15/2021   . Coordination of Care:  1. Have you been to the ER, urgent care clinic since your last visit? Hospitalized since your last visit? no    2. Have you seen or consulted any other health care providers outside of the [address] [place] [address] since your last visit?  Include any pap smears or colon screening.  no

## 2022-06-06 ENCOUNTER — TELEPHONE (OUTPATIENT)
Dept: FAMILY MEDICINE CLINIC | Age: 57
End: 2022-06-06

## 2022-06-06 NOTE — TELEPHONE ENCOUNTER
Patient called and said that her arm is warm,swollen,itching and red at the sight where she received her shot on last Wednesday.  Please advise

## 2022-06-06 NOTE — TELEPHONE ENCOUNTER
Spoke with patient. She stated after getting the pneumonia vaccine on 6/1/22 the next day her right arm (site of injection) was sore and a little itchy. The next day the area was swollen, warm to the touch, red and very itchy, still have same symptoms today. She only have put alcohol on the the area but that haven't helped any.   Please advise

## 2022-06-06 NOTE — TELEPHONE ENCOUNTER
Informed patient that this could be a normal reaction and to try cold compresses and OTC Motrin, if not any better in 2 weeks to come to office for evaluation, patient had some hesitancy so I advised her to call  to schedule an appt.

## 2022-06-06 NOTE — TELEPHONE ENCOUNTER
Local reaction is sometimes normal with vaccines, and will resolve within the next few weeks. May apply cold compress three times daily and take Motrin OTC as needed. If no improvement in a couple of weeks, please have patient follow up for re-evaluation. Thank you.

## 2022-06-07 ENCOUNTER — DOCUMENTATION ONLY (OUTPATIENT)
Dept: PULMONOLOGY | Age: 57
End: 2022-06-07

## 2022-06-07 NOTE — PROGRESS NOTES
Left message for pt re new patient sleep ref. Old records & sleep study from Dr. Irvin Santos in system. Has pt been seen anywhere more recently? Is pt currently on cpap? If she has been seen more recently, we will need most recent doc notes. Ref in system.

## 2022-06-08 DIAGNOSIS — I10 ESSENTIAL HYPERTENSION: ICD-10-CM

## 2022-06-08 RX ORDER — METOPROLOL SUCCINATE 25 MG/1
TABLET, EXTENDED RELEASE ORAL
Qty: 90 TABLET | Refills: 1 | Status: SHIPPED | OUTPATIENT
Start: 2022-06-08 | End: 2022-10-04

## 2022-06-10 DIAGNOSIS — E11.9 CONTROLLED TYPE 2 DIABETES MELLITUS WITHOUT COMPLICATION, WITHOUT LONG-TERM CURRENT USE OF INSULIN (HCC): ICD-10-CM

## 2022-06-10 DIAGNOSIS — E78.5 HYPERLIPIDEMIA, UNSPECIFIED HYPERLIPIDEMIA TYPE: ICD-10-CM

## 2022-06-10 RX ORDER — PRAVASTATIN SODIUM 40 MG/1
40 TABLET ORAL DAILY
Qty: 30 TABLET | Refills: 0 | Status: SHIPPED | OUTPATIENT
Start: 2022-06-10 | End: 2022-06-29 | Stop reason: ALTCHOICE

## 2022-06-10 RX ORDER — METFORMIN HYDROCHLORIDE 500 MG/1
500 TABLET, EXTENDED RELEASE ORAL
Qty: 30 TABLET | Refills: 0 | Status: SHIPPED | OUTPATIENT
Start: 2022-06-10 | End: 2022-06-29 | Stop reason: SDUPTHER

## 2022-06-17 ENCOUNTER — HOSPITAL ENCOUNTER (OUTPATIENT)
Dept: LAB | Age: 57
Discharge: HOME | End: 2022-06-17

## 2022-06-17 LAB — XX-LABCORP SPECIMEN COL,LCBCF: NORMAL

## 2022-06-17 PROCEDURE — 99001 SPECIMEN HANDLING PT-LAB: CPT

## 2022-06-18 LAB
ALBUMIN SERPL-MCNC: 4.1 G/DL (ref 3.8–4.9)
ALBUMIN/CREAT UR: <12 MG/G CREAT (ref 0–29)
ALBUMIN/GLOB SERPL: 1.6 {RATIO} (ref 1.2–2.2)
ALP SERPL-CCNC: 100 IU/L (ref 44–121)
ALT SERPL-CCNC: 9 IU/L (ref 0–32)
AST SERPL-CCNC: 16 IU/L (ref 0–40)
BASOPHILS # BLD AUTO: 0 X10E3/UL (ref 0–0.2)
BASOPHILS NFR BLD AUTO: 1 %
BILIRUB SERPL-MCNC: <0.2 MG/DL (ref 0–1.2)
BUN SERPL-MCNC: 12 MG/DL (ref 6–24)
BUN/CREAT SERPL: 18 (ref 9–23)
CALCIUM SERPL-MCNC: 8.8 MG/DL (ref 8.7–10.2)
CHLORIDE SERPL-SCNC: 105 MMOL/L (ref 96–106)
CHOLEST SERPL-MCNC: 155 MG/DL (ref 100–199)
CO2 SERPL-SCNC: 23 MMOL/L (ref 20–29)
CREAT SERPL-MCNC: 0.68 MG/DL (ref 0.57–1)
CREAT UR-MCNC: 24.2 MG/DL
EGFR: 102 ML/MIN/1.73
EOSINOPHIL # BLD AUTO: 0.3 X10E3/UL (ref 0–0.4)
EOSINOPHIL NFR BLD AUTO: 4 %
ERYTHROCYTE [DISTWIDTH] IN BLOOD BY AUTOMATED COUNT: 13.7 % (ref 11.7–15.4)
EST. AVERAGE GLUCOSE BLD GHB EST-MCNC: 134 MG/DL
GLOBULIN SER CALC-MCNC: 2.6 G/DL (ref 1.5–4.5)
GLUCOSE SERPL-MCNC: 99 MG/DL (ref 65–99)
HBA1C MFR BLD: 6.3 % (ref 4.8–5.6)
HCT VFR BLD AUTO: 37.8 % (ref 34–46.6)
HDLC SERPL-MCNC: 49 MG/DL
HGB BLD-MCNC: 11.7 G/DL (ref 11.1–15.9)
IMM GRANULOCYTES # BLD AUTO: 0 X10E3/UL (ref 0–0.1)
IMM GRANULOCYTES NFR BLD AUTO: 0 %
IMP & REVIEW OF LAB RESULTS: NORMAL
LDLC SERPL CALC-MCNC: 94 MG/DL (ref 0–99)
LYMPHOCYTES # BLD AUTO: 2.8 X10E3/UL (ref 0.7–3.1)
LYMPHOCYTES NFR BLD AUTO: 47 %
MCH RBC QN AUTO: 25 PG (ref 26.6–33)
MCHC RBC AUTO-ENTMCNC: 31 G/DL (ref 31.5–35.7)
MCV RBC AUTO: 81 FL (ref 79–97)
MICROALBUMIN UR-MCNC: <3 UG/ML
MONOCYTES # BLD AUTO: 0.5 X10E3/UL (ref 0.1–0.9)
MONOCYTES NFR BLD AUTO: 8 %
NEUTROPHILS # BLD AUTO: 2.4 X10E3/UL (ref 1.4–7)
NEUTROPHILS NFR BLD AUTO: 40 %
PLATELET # BLD AUTO: 290 X10E3/UL (ref 150–450)
POTASSIUM SERPL-SCNC: 4.3 MMOL/L (ref 3.5–5.2)
PROT SERPL-MCNC: 6.7 G/DL (ref 6–8.5)
RBC # BLD AUTO: 4.68 X10E6/UL (ref 3.77–5.28)
SODIUM SERPL-SCNC: 143 MMOL/L (ref 134–144)
TRIGL SERPL-MCNC: 60 MG/DL (ref 0–149)
VLDLC SERPL CALC-MCNC: 12 MG/DL (ref 5–40)
WBC # BLD AUTO: 6 X10E3/UL (ref 3.4–10.8)

## 2022-06-24 ENCOUNTER — DOCUMENTATION ONLY (OUTPATIENT)
Dept: PULMONOLOGY | Age: 57
End: 2022-06-24

## 2022-06-26 NOTE — PROGRESS NOTES
Chief Complaint   Patient presents with    Cholesterol Problem    Diabetes    Hypertension    Labs     Assessment & Plan:     1. Type 2 diabetes mellitus with complication, without long-term current use of insulin ([facility])  Assessment & Plan:  A1c at goal of <7, continue regimen  Recheck A1c in 6 mos    Orders:  -     metFORMIN ER (GLUCOPHAGE XR) 500 mg tablet; Take 1 Tablet by mouth daily (with dinner). , Normal, Disp-90 Tablet, R-0NEEDS LABS FOR FURTHER REFILLS  2. Hyperlipidemia associated with type 2 diabetes mellitus ([facility]  Assessment & Plan:  LDL elevated, goal <70  Switch pravastatin to atorvastatin 10 mg and recheck labs in 3 mos  Orders:  -     LIPID PANEL; Future  -     METABOLIC PANEL, COMPREHENSIVE; Future  -     atorvastatin (LIPITOR) 10 mg tablet; Take 1 Tablet by mouth daily. , Normal, Disp-90 Tablet, R-0  3. Essential hypertension  Assessment & Plan:  BP not at goal <130/80  Continue losartan, increase metoprolol back to 25mg  Follow up in 4 weeks  Consider trial chlorthalidone 25  4. Encounter to discuss test results    Follow-up and Dispositions    · Return in about 4 weeks (around 7/27/2022) for blood pressure.   · 3 mos for cholesterol, diabetes, blood pressure, lab results (CMP, lipid panel)       Subjective:     HPI    Type 2 DM-  Home BG readings range from 110s-140s  Hypoglycemia:  None  On statin: Pravastatin 40 mg  Comorbid: HLD, HTN morbid obesity  Followed by endocrine: No  Current treatment:  Metformin  mg daily     Hyperlipidemia  Compliant with meds  Comorbid: type 2 DM, HTN, morbid obesity  Current treatment:  Pravastatin 40 mg     Hypertension-  Symptoms:  fatigue  BP readings at home are:  Home readings systolic 015X  Comorbid:  Type 2 DM, HLD, morbid obesity  Current treatment: metoprolol 12.5 mg daily, Losartan 100 mg daily  She was started on metoprolol for palpitations at the end of April by cardiology, has improved  Denies any dizziness or chest pains    Health Maintenance:  Diabetic eye exam -  Still need to schedule, financial barrier  Diabetic foot exam - defer to next visit    Review of Systems   Constitutional: Negative for chills, fever and malaise/fatigue. Respiratory: Negative for shortness of breath. Cardiovascular: Negative for chest pain. Objective:   BP (!) 157/90   Pulse 76   Temp 97.4 °F (36.3 °C) (Oral)   Resp 16   Ht 5' 2\" (1.575 m)   Wt 250 lb (113.4 kg)   LMP  (LMP Unknown)   SpO2 97%   BMI 45.73 kg/m²      Physical Exam  Vitals and nursing note reviewed. Constitutional:       General: She is not in acute distress. Appearance: She is not ill-appearing. HENT:      Head: Normocephalic and atraumatic. Cardiovascular:      Rate and Rhythm: Normal rate and regular rhythm. Pulmonary:      Effort: Pulmonary effort is normal. No respiratory distress. Breath sounds: No wheezing, rhonchi or rales. Skin:     General: Skin is warm and dry. Neurological:      General: No focal deficit present. Mental Status: She is alert and oriented to person, place, and time. Psychiatric:         Mood and Affect: Mood normal.         Thought Content:  Thought content normal.         Judgment: Judgment normal.           MARIO Medeiros

## 2022-06-26 NOTE — ASSESSMENT & PLAN NOTE
BP not at goal <130/80  Continue losartan, increase metoprolol back to 25mg  Follow up in 4 weeks  Consider trial chlorthalidone 25

## 2022-06-29 ENCOUNTER — OFFICE VISIT (OUTPATIENT)
Dept: FAMILY MEDICINE CLINIC | Age: 57
End: 2022-06-29
Payer: COMMERCIAL

## 2022-06-29 VITALS
HEIGHT: 62 IN | DIASTOLIC BLOOD PRESSURE: 90 MMHG | BODY MASS INDEX: 46.01 KG/M2 | RESPIRATION RATE: 16 BRPM | SYSTOLIC BLOOD PRESSURE: 157 MMHG | OXYGEN SATURATION: 97 % | WEIGHT: 250 LBS | HEART RATE: 76 BPM | TEMPERATURE: 97.4 F

## 2022-06-29 DIAGNOSIS — E78.5 HYPERLIPIDEMIA ASSOCIATED WITH TYPE 2 DIABETES MELLITUS (HCC): ICD-10-CM

## 2022-06-29 DIAGNOSIS — E11.69 HYPERLIPIDEMIA ASSOCIATED WITH TYPE 2 DIABETES MELLITUS (HCC): ICD-10-CM

## 2022-06-29 DIAGNOSIS — Z71.2 ENCOUNTER TO DISCUSS TEST RESULTS: ICD-10-CM

## 2022-06-29 DIAGNOSIS — E11.8 TYPE 2 DIABETES MELLITUS WITH COMPLICATION, WITHOUT LONG-TERM CURRENT USE OF INSULIN (HCC): Primary | ICD-10-CM

## 2022-06-29 DIAGNOSIS — I10 ESSENTIAL HYPERTENSION: ICD-10-CM

## 2022-06-29 PROCEDURE — 3044F HG A1C LEVEL LT 7.0%: CPT | Performed by: NURSE PRACTITIONER

## 2022-06-29 PROCEDURE — 99214 OFFICE O/P EST MOD 30 MIN: CPT | Performed by: NURSE PRACTITIONER

## 2022-06-29 RX ORDER — METFORMIN HYDROCHLORIDE 500 MG/1
500 TABLET, EXTENDED RELEASE ORAL
Qty: 90 TABLET | Refills: 0 | Status: SHIPPED | OUTPATIENT
Start: 2022-06-29 | End: 2022-09-30

## 2022-06-29 RX ORDER — ATORVASTATIN CALCIUM 10 MG/1
10 TABLET, FILM COATED ORAL DAILY
Qty: 90 TABLET | Refills: 0 | Status: SHIPPED | OUTPATIENT
Start: 2022-06-29 | End: 2022-09-29 | Stop reason: DRUGHIGH

## 2022-06-29 NOTE — PROGRESS NOTES
Clearnce Space presents today for   Chief Complaint   Patient presents with    Cholesterol Problem    Diabetes    Hypertension    Labs       Is someone accompanying this pt? no    Is the patient using any DME equipment during OV? no    Depression Screening:  3 most recent PHQ Screens 6/29/2022   PHQ Not Done -   Little interest or pleasure in doing things Not at all   Feeling down, depressed, irritable, or hopeless Not at all   Total Score PHQ 2 0       Learning Assessment:  Learning Assessment 1/26/2022   PRIMARY LEARNER Patient   HIGHEST LEVEL OF EDUCATION - PRIMARY LEARNER  GRADUATED HIGH SCHOOL OR GED   BARRIERS PRIMARY LEARNER NONE   CO-LEARNER CAREGIVER No   PRIMARY LANGUAGE ENGLISH    NEED No   LEARNER PREFERENCE PRIMARY DEMONSTRATION   ANSWERED BY patient   RELATIONSHIP SELF       Fall Risk  No flowsheet data found. ADL  No flowsheet data found. Travel Screening:    Travel Screening     Question   Response    In the last 10 days, have you been in contact with someone who was confirmed or suspected to have Coronavirus/COVID-19? No / Unsure    Have you had a COVID-19 viral test in the last 10 days? No    Do you have any of the following new or worsening symptoms? Have you traveled internationally or domestically in the last month? No      Travel History   Travel since 05/29/22    No documented travel since 05/29/22         Health Maintenance reviewed and discussed and ordered per Provider. Health Maintenance Due   Topic Date Due    Eye Exam Retinal or Dilated  Never done    Foot Exam Q1  09/15/2021   . Coordination of Care:  1. Have you been to the ER, urgent care clinic since your last visit? Hospitalized since your last visit? no    2. Have you seen or consulted any other health care providers outside of the [address] [place] [address] since your last visit? Include any pap smears or colon screening.  no

## 2022-07-12 ENCOUNTER — TELEPHONE (OUTPATIENT)
Dept: FAMILY MEDICINE CLINIC | Age: 57
End: 2022-07-12

## 2022-07-12 NOTE — TELEPHONE ENCOUNTER
Patient did home test for Covid on Friday, it was positive. She went to [facility] on Sunday and they did a rapid test and it was positive. Just mild symptoms, sore throat, headache. She had her vaccines and booster. Can she return to work tomorrow? Her note from [facility] said she can return tomorrow (based on her symptoms started last Tues and she said they were counting Wednesday as her first day. This would be 7 days. She doesn't want to risk anyone else.

## 2022-07-13 ENCOUNTER — TELEPHONE (OUTPATIENT)
Dept: FAMILY MEDICINE CLINIC | Age: 57
End: 2022-07-13

## 2022-07-13 NOTE — TELEPHONE ENCOUNTER
Yes, patient may return to work but should continue to wear mask for 5 more days while both indoors and outdoors.

## 2022-07-26 NOTE — PROGRESS NOTES
Chief Complaint   Patient presents with    Hypertension     Assessment & Plan:     1. Essential hypertension  Assessment & Plan:   At goal <130/80, continue regimen      Follow-up and Dispositions    Return in 2 months (on 9/29/2022) for cholesterol, diabetes, blood pressure, lab results (CMP, lipid panel). Subjective:     HPI    Hypertension-  Symptoms:  none  BP readings at home systolic <189  Comorbid:  Type 2 DM, HLD, morbid obesity  Current treatment: metoprolol 25, Losartan 100 mg daily  Denies any dizziness or chest pains  Increased stress at home with  having stroke and multiple trips to [facility]. Plans to reach out to [facility] with her job to help cope with stress      Review of Systems   Constitutional:  Negative for chills, fever and malaise/fatigue. Respiratory:  Negative for shortness of breath. Cardiovascular:  Negative for chest pain. Objective:   /79 (BP 1 Location: Left upper arm, BP Patient Position: Sitting, BP Cuff Size: Adult)   Pulse 74   Temp 98 °F (36.7 °C) (Oral)   Resp 17   Ht 5' 2\" (1.575 m)   Wt 240 lb (108.9 kg)   LMP  (LMP Unknown)   SpO2 99%   BMI 43.90 kg/m²      Physical Exam  Vitals and nursing note reviewed. Constitutional:       General: She is not in acute distress. Appearance: She is not ill-appearing. HENT:      Head: Normocephalic and atraumatic. Cardiovascular:      Rate and Rhythm: Normal rate and regular rhythm. Pulmonary:      Effort: Pulmonary effort is normal. No respiratory distress. Breath sounds: No wheezing, rhonchi or rales. Skin:     General: Skin is warm and dry. Neurological:      General: No focal deficit present. Mental Status: She is alert and oriented to person, place, and time. Psychiatric:         Mood and Affect: Mood normal.         Thought Content:  Thought content normal.         Judgment: Judgment normal.          MARIO Garrido

## 2022-07-27 ENCOUNTER — OFFICE VISIT (OUTPATIENT)
Dept: FAMILY MEDICINE CLINIC | Age: 57
End: 2022-07-27
Payer: COMMERCIAL

## 2022-07-27 VITALS
WEIGHT: 240 LBS | BODY MASS INDEX: 44.16 KG/M2 | DIASTOLIC BLOOD PRESSURE: 79 MMHG | OXYGEN SATURATION: 99 % | TEMPERATURE: 98 F | SYSTOLIC BLOOD PRESSURE: 133 MMHG | RESPIRATION RATE: 17 BRPM | HEIGHT: 62 IN | HEART RATE: 74 BPM

## 2022-07-27 DIAGNOSIS — I10 ESSENTIAL HYPERTENSION: Primary | ICD-10-CM

## 2022-07-27 PROCEDURE — 99213 OFFICE O/P EST LOW 20 MIN: CPT | Performed by: NURSE PRACTITIONER

## 2022-07-27 NOTE — PROGRESS NOTES
Asad Reasoner presents today for   Chief Complaint   Patient presents with    Hypertension       Is someone accompanying this pt? no    Is the patient using any DME equipment during OV? no    Depression Screening:  3 most recent PHQ Screens 7/27/2022   PHQ Not Done -   Little interest or pleasure in doing things Not at all   Feeling down, depressed, irritable, or hopeless Not at all   Total Score PHQ 2 0       Learning Assessment:  Learning Assessment 1/26/2022   PRIMARY LEARNER Patient   HIGHEST LEVEL OF EDUCATION - PRIMARY LEARNER  GRADUATED HIGH SCHOOL OR GED   BARRIERS PRIMARY LEARNER NONE   CO-LEARNER CAREGIVER No   PRIMARY LANGUAGE ENGLISH    NEED No   LEARNER PREFERENCE PRIMARY DEMONSTRATION   ANSWERED BY patient   RELATIONSHIP SELF       Fall Risk  No flowsheet data found. ADL  No flowsheet data found. Travel Screening:    Travel Screening       Question Response    In the last 10 days, have you been in contact with someone who was confirmed or suspected to have Coronavirus/COVID-19? No / Unsure    Have you had a COVID-19 viral test in the last 10 days? No    Do you have any of the following new or worsening symptoms? None of these    Have you traveled internationally or domestically in the last month? No          Travel History   Travel since 06/27/22    No documented travel since 06/27/22         Health Maintenance reviewed and discussed and ordered per Provider. Health Maintenance Due   Topic Date Due    Eye Exam Retinal or Dilated  Never done    Foot Exam Q1  09/15/2021    COVID-19 Vaccine (4 - Booster for Moderna series) 04/04/2022   . Coordination of Care:  1. Have you been to the ER, urgent care clinic since your last visit? Hospitalized since your last visit? no    2. Have you seen or consulted any other health care providers outside of the [address] [place] [address] since your last visit? Include any pap smears or colon screening.  no

## 2022-08-25 RX ORDER — LOSARTAN POTASSIUM 100 MG/1
100 TABLET ORAL DAILY
Qty: 90 TABLET | Refills: 1 | Status: SHIPPED | OUTPATIENT
Start: 2022-08-25

## 2022-09-19 ENCOUNTER — HOSPITAL ENCOUNTER (OUTPATIENT)
Dept: LAB | Age: 57
Discharge: HOME | End: 2022-09-19

## 2022-09-19 LAB — XX-LABCORP SPECIMEN COL,LCBCF: NORMAL

## 2022-09-19 PROCEDURE — 99001 SPECIMEN HANDLING PT-LAB: CPT

## 2022-09-20 LAB
ALBUMIN SERPL-MCNC: 4.5 G/DL (ref 3.8–4.9)
ALBUMIN/GLOB SERPL: 1.8 {RATIO} (ref 1.2–2.2)
ALP SERPL-CCNC: 109 IU/L (ref 44–121)
ALT SERPL-CCNC: 12 IU/L (ref 0–32)
AST SERPL-CCNC: 16 IU/L (ref 0–40)
BILIRUB SERPL-MCNC: 0.3 MG/DL (ref 0–1.2)
BUN SERPL-MCNC: 11 MG/DL (ref 6–24)
BUN/CREAT SERPL: 16 (ref 9–23)
CALCIUM SERPL-MCNC: 9.2 MG/DL (ref 8.7–10.2)
CHLORIDE SERPL-SCNC: 101 MMOL/L (ref 96–106)
CHOLEST SERPL-MCNC: 146 MG/DL (ref 100–199)
CO2 SERPL-SCNC: 23 MMOL/L (ref 20–29)
CREAT SERPL-MCNC: 0.69 MG/DL (ref 0.57–1)
EGFR: 101 ML/MIN/1.73
GLOBULIN SER CALC-MCNC: 2.5 G/DL (ref 1.5–4.5)
GLUCOSE SERPL-MCNC: 92 MG/DL (ref 65–99)
HDLC SERPL-MCNC: 47 MG/DL
IMP & REVIEW OF LAB RESULTS: NORMAL
LDLC SERPL CALC-MCNC: 86 MG/DL (ref 0–99)
POTASSIUM SERPL-SCNC: 4.4 MMOL/L (ref 3.5–5.2)
PROT SERPL-MCNC: 7 G/DL (ref 6–8.5)
SODIUM SERPL-SCNC: 136 MMOL/L (ref 134–144)
TRIGL SERPL-MCNC: 65 MG/DL (ref 0–149)
VLDLC SERPL CALC-MCNC: 13 MG/DL (ref 5–40)

## 2022-09-29 ENCOUNTER — OFFICE VISIT (OUTPATIENT)
Dept: FAMILY MEDICINE CLINIC | Age: 57
End: 2022-09-29
Payer: COMMERCIAL

## 2022-09-29 VITALS
HEIGHT: 62 IN | BODY MASS INDEX: 44.61 KG/M2 | HEART RATE: 75 BPM | DIASTOLIC BLOOD PRESSURE: 69 MMHG | OXYGEN SATURATION: 99 % | WEIGHT: 242.4 LBS | SYSTOLIC BLOOD PRESSURE: 124 MMHG | TEMPERATURE: 98.2 F | RESPIRATION RATE: 20 BRPM

## 2022-09-29 DIAGNOSIS — E78.5 HYPERLIPIDEMIA ASSOCIATED WITH TYPE 2 DIABETES MELLITUS (HCC): Primary | ICD-10-CM

## 2022-09-29 DIAGNOSIS — Z23 NEEDS FLU SHOT: ICD-10-CM

## 2022-09-29 DIAGNOSIS — I10 ESSENTIAL HYPERTENSION: ICD-10-CM

## 2022-09-29 DIAGNOSIS — E11.69 HYPERLIPIDEMIA ASSOCIATED WITH TYPE 2 DIABETES MELLITUS (HCC): Primary | ICD-10-CM

## 2022-09-29 DIAGNOSIS — Z12.31 ENCOUNTER FOR SCREENING MAMMOGRAM FOR MALIGNANT NEOPLASM OF BREAST: ICD-10-CM

## 2022-09-29 DIAGNOSIS — E11.8 TYPE 2 DIABETES MELLITUS WITH COMPLICATION, WITHOUT LONG-TERM CURRENT USE OF INSULIN (HCC): ICD-10-CM

## 2022-09-29 DIAGNOSIS — Z71.2 ENCOUNTER TO DISCUSS TEST RESULTS: ICD-10-CM

## 2022-09-29 PROCEDURE — G0008 ADMIN INFLUENZA VIRUS VAC: HCPCS | Performed by: NURSE PRACTITIONER

## 2022-09-29 PROCEDURE — 99214 OFFICE O/P EST MOD 30 MIN: CPT | Performed by: NURSE PRACTITIONER

## 2022-09-29 PROCEDURE — 3044F HG A1C LEVEL LT 7.0%: CPT | Performed by: NURSE PRACTITIONER

## 2022-09-29 PROCEDURE — 90686 IIV4 VACC NO PRSV 0.5 ML IM: CPT | Performed by: NURSE PRACTITIONER

## 2022-09-29 RX ORDER — ATORVASTATIN CALCIUM 20 MG/1
20 TABLET, FILM COATED ORAL DAILY
Qty: 90 TABLET | Refills: 0 | Status: SHIPPED | OUTPATIENT
Start: 2022-09-29

## 2022-09-29 NOTE — PROGRESS NOTES
Eve Rasmussen presents today for   Chief Complaint   Patient presents with    Cholesterol Problem     High chol    Diabetes    Hypertension    Results     Discuss lab results       Eve Rasmussen preferred language for health care discussion is english/other. Is someone accompanying this pt? no    Is the patient using any DME equipment during [address]? no    Depression Screening:  3 most recent PHQ Screens 9/29/2022   PHQ Not Done -   Little interest or pleasure in doing things Not at all   Feeling down, depressed, irritable, or hopeless Not at all   Total Score PHQ 2 0       Learning Assessment:  Learning Assessment 1/26/2022   PRIMARY LEARNER Patient   HIGHEST LEVEL OF EDUCATION - PRIMARY LEARNER  GRADUATED HIGH SCHOOL OR GED   BARRIERS PRIMARY LEARNER NONE   CO-LEARNER CAREGIVER No   PRIMARY LANGUAGE ENGLISH    NEED No   LEARNER PREFERENCE PRIMARY DEMONSTRATION   ANSWERED BY patient   RELATIONSHIP SELF       Abuse Screening:  Abuse Screening Questionnaire 2/24/2022   Do you ever feel afraid of your partner? N   Are you in a relationship with someone who physically or mentally threatens you? N   Is it safe for you to go home? Y       Generalized Anxiety  No flowsheet data found. Health Maintenance Due   Topic Date Due    Eye Exam Retinal or Dilated  Never done    Foot Exam Q1  09/15/2021    COVID-19 Vaccine (4 - Booster for Moderna series) 04/04/2022    Flu Vaccine (1) 08/01/2022   . Health Maintenance reviewed and discussed and ordered per Provider. Coordination of Care:  1. Have you been to the ER, urgent care clinic since your last visit? Hospitalized since your last visit? no    2. Have you seen or consulted any other health care providers outside of the [address] [place] [address] since your last visit? Include any pap smears or colon screening.  no      Advance Directive:  discussed 1/26/22

## 2022-09-29 NOTE — PROGRESS NOTES
Chief Complaint   Patient presents with    Cholesterol Problem     High chol    Diabetes    Hypertension    Results     Discuss lab results     Assessment & Plan:     1. Hyperlipidemia associated with type 2 diabetes mellitus ([address]  Assessment & Plan:  LDL improved, goal <70  Increase atorvastatin to 20 mg and recheck labs in 3 mos  Orders:  -     LIPID PANEL; Future  -     METABOLIC PANEL, COMPREHENSIVE; Future  -     atorvastatin (LIPITOR) 20 mg tablet; Take 1 Tablet by mouth daily. , Normal, Disp-90 Tablet, R-0  2. Type 2 diabetes mellitus with complication, without long-term current use of insulin [facility])  Assessment & Plan:  A1c at goal as of June 2022, continue regimen  Repeat A1c in 3 mos  Orders:  -     METABOLIC PANEL, COMPREHENSIVE; Future  -     HEMOGLOBIN A1C WITH EAG; Future  3. Essential hypertension  Assessment & Plan:  BP at goal, <130/80, continue regimen  Orders:  -     METABOLIC PANEL, COMPREHENSIVE; Future  4. Encounter for screening mammogram for malignant neoplasm of breast  -     [facility] MAMMO BI SCREENING INCL CAD; Future  5. Needs flu shot  -     INFLUENZA, FLUARIX, FLULAVAL, FLUZONE (AGE 6 MO+), AFLURIA(AGE 3Y+) IM, PF, 0.5 ML  6. Encounter to discuss test results    Follow-up and Dispositions    Return in about 3 months (around 12/29/2022) for diabetes, cholesterol, blood pressure, lab results.        Subjective:     HPI    Hyperlipidemia  Compliant with meds  Comorbid: type 2 DM, HTN, morbid obesity  Current treatment:  Pravastatin 40 mg switched to atorvastatin 10 mg 3 mos ago    Type 2 DM-  Home BG readings range from 109-120  Hypoglycemia:  None  On statin: Atorvastatin 10 mg  Comorbid: HLD, HTN morbid obesity  Followed by endocrine: No  Current treatment:  Metformin  mg daily    Hypertension-  Symptoms:  fatigue  BP readings at home are not checked recently  Comorbid:  Type 2 DM, HLD, morbid obesity  Current treatment: metoprolol 25 mg daily, Losartan 100 mg daily    Health Maintenance:  COVID-19 vac - due for booster  Flu vac - given today    Review of Systems   Constitutional:  Negative for chills, fever and malaise/fatigue. Respiratory:  Negative for shortness of breath. Cardiovascular:  Negative for chest pain. Objective:   /69 (BP 1 Location: Right upper arm, BP Patient Position: Sitting, BP Cuff Size: Large adult)   Pulse 75   Temp 98.2 °F (36.8 °C) (Oral)   Resp 20   Ht 5' 2\" (1.575 m)   Wt 242 lb 6.4 oz (110 kg)   LMP  (LMP Unknown)   SpO2 99%   BMI 44.34 kg/m²      Physical Exam  Vitals and nursing note reviewed. Constitutional:       General: She is not in acute distress. Appearance: She is not ill-appearing. HENT:      Head: Normocephalic and atraumatic. Cardiovascular:      Rate and Rhythm: Normal rate and regular rhythm. Pulmonary:      Effort: Pulmonary effort is normal. No respiratory distress. Breath sounds: No wheezing, rhonchi or rales. Skin:     General: Skin is warm and dry. Neurological:      General: No focal deficit present. Mental Status: She is alert and oriented to person, place, and time. Psychiatric:         Mood and Affect: Mood normal.         Thought Content:  Thought content normal.         Judgment: Judgment normal.          MARIO Koenig

## 2022-09-30 DIAGNOSIS — E11.8 TYPE 2 DIABETES MELLITUS WITH COMPLICATION, WITHOUT LONG-TERM CURRENT USE OF INSULIN (HCC): ICD-10-CM

## 2022-09-30 RX ORDER — METFORMIN HYDROCHLORIDE 500 MG/1
TABLET, EXTENDED RELEASE ORAL
Qty: 90 TABLET | Refills: 0 | Status: SHIPPED | OUTPATIENT
Start: 2022-09-30

## 2022-10-03 DIAGNOSIS — I10 ESSENTIAL HYPERTENSION: ICD-10-CM

## 2022-10-04 RX ORDER — METOPROLOL SUCCINATE 25 MG/1
TABLET, EXTENDED RELEASE ORAL
Qty: 90 TABLET | Refills: 1 | Status: SHIPPED | OUTPATIENT
Start: 2022-10-04

## 2022-12-23 NOTE — PROGRESS NOTES
Chief Complaint   Patient presents with    Diabetes    Cholesterol Problem    Hypertension     Assessment & Plan:     1. Type 2 diabetes mellitus with complication, without long-term current use of insulin [facility])  Assessment & Plan:  Overdue for labs, advised to complete  Continue regimen for now and follow up in 4 weeks  2. Hyperlipidemia associated with type 2 diabetes mellitus [facility])  Assessment & Plan:  Overdue for repeat labs, advised to complete  Continue regimen in the meantime and follow up in 4 weeks  3. Essential hypertension  Assessment & Plan:  BP at goal, <130/80, continue regimen  4. Encounter for screening mammogram for malignant neoplasm of breast  -     [facility] MAMMO BI SCREENING INCL CAD; Future    Follow-up and Dispositions    Return in about 4 weeks (around 1/31/2023) for diabetes, cholesterol, lab results. Subjective:     HPI    Hyperlipidemia  Compliant with meds  Comorbid: type 2 DM, HTN, morbid obesity  Current treatment:  atorvastatin 20 mg    Type 2 DM-  Home BG readings stays in the 1-teens  Hypoglycemia:  None  On statin: Atorvastatin 10 mg  Comorbid: HLD, HTN morbid obesity  Followed by endocrine: No  Current treatment:  Metformin  mg daily    Hypertension-  Symptoms:  fatigue  BP readings at home are in the 1-teens  Comorbid:  Type 2 DM, HLD, morbid obesity  Current treatment: metoprolol 25 mg daily, Losartan 100 mg daily    Health Maintenance:  COVID-19 vac - due for booster  Diabetic eye exam - previously referred  Diabetic foot exam - previously referred  Mammogram - ordered      Review of Systems   Constitutional:  Negative for chills, fever and malaise/fatigue. Respiratory:  Negative for shortness of breath. Cardiovascular:  Negative for chest pain.      Objective:   /75 (BP 1 Location: Left upper arm, BP Patient Position: Sitting, BP Cuff Size: Large adult)   Pulse (!) 58   Temp 98.1 °F (36.7 °C) (Oral)   Resp 16   Ht 5' 2\" (1.575 m)   Wt 252 lb (114.3 kg) LMP  (LMP Unknown)   SpO2 100%   BMI 46.09 kg/m²      Physical Exam  Vitals and nursing note reviewed. Constitutional:       General: She is not in acute distress. Appearance: She is not ill-appearing. HENT:      Head: Normocephalic and atraumatic. Cardiovascular:      Rate and Rhythm: Regular rhythm. Bradycardia present. Pulmonary:      Effort: Pulmonary effort is normal. No respiratory distress. Breath sounds: No wheezing, rhonchi or rales. Skin:     General: Skin is warm and dry. Neurological:      General: No focal deficit present. Mental Status: She is alert and oriented to person, place, and time. Psychiatric:         Mood and Affect: Mood normal.         Thought Content:  Thought content normal.         Judgment: Judgment normal.          MARIO Carr

## 2022-12-28 DIAGNOSIS — E78.5 HYPERLIPIDEMIA ASSOCIATED WITH TYPE 2 DIABETES MELLITUS (HCC): ICD-10-CM

## 2022-12-28 DIAGNOSIS — E11.69 HYPERLIPIDEMIA ASSOCIATED WITH TYPE 2 DIABETES MELLITUS (HCC): ICD-10-CM

## 2022-12-28 DIAGNOSIS — E11.8 TYPE 2 DIABETES MELLITUS WITH COMPLICATION, WITHOUT LONG-TERM CURRENT USE OF INSULIN (HCC): ICD-10-CM

## 2022-12-28 RX ORDER — ATORVASTATIN CALCIUM 20 MG/1
TABLET, FILM COATED ORAL
Qty: 90 TABLET | Refills: 0 | Status: SHIPPED | OUTPATIENT
Start: 2022-12-28

## 2022-12-28 RX ORDER — METFORMIN HYDROCHLORIDE 500 MG/1
TABLET, EXTENDED RELEASE ORAL
Qty: 90 TABLET | Refills: 0 | Status: SHIPPED | OUTPATIENT
Start: 2022-12-28

## 2023-01-03 ENCOUNTER — OFFICE VISIT (OUTPATIENT)
Dept: FAMILY MEDICINE CLINIC | Age: 58
End: 2023-01-03
Payer: COMMERCIAL

## 2023-01-03 VITALS
OXYGEN SATURATION: 100 % | WEIGHT: 252 LBS | HEIGHT: 62 IN | HEART RATE: 58 BPM | DIASTOLIC BLOOD PRESSURE: 75 MMHG | BODY MASS INDEX: 46.38 KG/M2 | SYSTOLIC BLOOD PRESSURE: 115 MMHG | RESPIRATION RATE: 16 BRPM | TEMPERATURE: 98.1 F

## 2023-01-03 DIAGNOSIS — E78.5 HYPERLIPIDEMIA ASSOCIATED WITH TYPE 2 DIABETES MELLITUS (HCC): ICD-10-CM

## 2023-01-03 DIAGNOSIS — E11.8 TYPE 2 DIABETES MELLITUS WITH COMPLICATION, WITHOUT LONG-TERM CURRENT USE OF INSULIN (HCC): Primary | ICD-10-CM

## 2023-01-03 DIAGNOSIS — E11.69 HYPERLIPIDEMIA ASSOCIATED WITH TYPE 2 DIABETES MELLITUS (HCC): ICD-10-CM

## 2023-01-03 DIAGNOSIS — Z12.31 ENCOUNTER FOR SCREENING MAMMOGRAM FOR MALIGNANT NEOPLASM OF BREAST: ICD-10-CM

## 2023-01-03 DIAGNOSIS — I10 ESSENTIAL HYPERTENSION: ICD-10-CM

## 2023-01-03 PROCEDURE — 3078F DIAST BP <80 MM HG: CPT | Performed by: NURSE PRACTITIONER

## 2023-01-03 PROCEDURE — 99214 OFFICE O/P EST MOD 30 MIN: CPT | Performed by: NURSE PRACTITIONER

## 2023-01-03 PROCEDURE — 3074F SYST BP LT 130 MM HG: CPT | Performed by: NURSE PRACTITIONER

## 2023-01-03 NOTE — PROGRESS NOTES
Gómez Patrick presents today for   Chief Complaint   Patient presents with    Diabetes    Cholesterol Problem    Hypertension       Is someone accompanying this pt? no    Is the patient using any DME equipment during OV? no    Depression Screening:  3 most recent PHQ Screens 1/3/2023   PHQ Not Done -   Little interest or pleasure in doing things Not at all   Feeling down, depressed, irritable, or hopeless Not at all   Total Score PHQ 2 0       Learning Assessment:  Learning Assessment 1/3/2023   PRIMARY LEARNER Patient   HIGHEST LEVEL OF EDUCATION - PRIMARY LEARNER  GRADUATED HIGH SCHOOL OR GED   BARRIERS PRIMARY LEARNER NONE   CO-LEARNER CAREGIVER No   PRIMARY LANGUAGE ENGLISH    NEED -   LEARNER PREFERENCE PRIMARY DEMONSTRATION   ANSWERED BY patient   RELATIONSHIP SELF       Fall Risk  No flowsheet data found. ADL  No flowsheet data found. Travel Screening:    Travel Screening     No screening recorded since 01/02/23 0000       Travel History   Travel since 12/03/22    No documented travel since 12/03/22         Health Maintenance reviewed and discussed and ordered per Provider. Health Maintenance Due   Topic Date Due    Eye Exam Retinal or Dilated  Never done    Hepatitis B Vaccine (1 of 3 - Risk 3-dose series) Never done    Foot Exam Q1  09/15/2021    COVID-19 Vaccine (4 - Booster for Moderna series) 01/29/2022    Breast Cancer Screen Mammogram  01/05/2023   . Coordination of Care:  1. Have you been to the ER, urgent care clinic since your last visit? Hospitalized since your last visit? no    2. Have you seen or consulted any other health care providers outside of the [address] [place] [address] since your last visit? Include any pap smears or colon screening.  no

## 2023-01-04 NOTE — ASSESSMENT & PLAN NOTE
Overdue for repeat labs, advised to complete  Continue regimen in the meantime and follow up in 4 weeks

## 2023-01-23 ENCOUNTER — HOSPITAL ENCOUNTER (OUTPATIENT)
Dept: LAB | Age: 58
Discharge: HOME | End: 2023-01-23

## 2023-01-23 LAB — XX-LABCORP SPECIMEN COL,LCBCF: NORMAL

## 2023-01-23 PROCEDURE — 99001 SPECIMEN HANDLING PT-LAB: CPT

## 2023-01-30 NOTE — PROGRESS NOTES
Chief Complaint   Patient presents with    Diabetes    Cholesterol Problem    Hypertension    Labs     Assessment & Plan:     1. Type 2 diabetes mellitus with complication, without long-term current use of insulin (HCC)  Assessment & Plan:  A1c remains at goal of <7, continue regimen  Recheck A1c in 6 mos  Orders:  -     LIPID PANEL; Future  -     HEMOGLOBIN A1C WITH EAG; Future  2. Hyperlipidemia associated with type 2 diabetes mellitus ([facility] [address]  Assessment & Plan:  LDL elevated, goal <70  Continue regimen for now and recheck lipid panel in 6 mos  If LDL goal not achieved in 6 mos, increase atorvastatin to 40 mg  Orders:  -     METABOLIC PANEL, COMPREHENSIVE; Future  -     LIPID PANEL; Future  3. Essential hypertension  Assessment & Plan:  BP at goal, <130/80, continue regimen  Orders:  -     METABOLIC PANEL, COMPREHENSIVE; Future  -     CBC WITH AUTOMATED DIFF; Future  4. Encounter for screening mammogram for malignant neoplasm of breast  Comments:  Scheduled on 02/06/2023  5. Encounter to discuss test results    Follow-up and Dispositions    Return in about 6 months (around 8/1/2023) for physical, diabetes, blood pressure, cholesterol, lab results.        Subjective:     HPI    Hyperlipidemia  Compliant with meds  Comorbid: type 2 DM, HTN, morbid obesity  Current treatment:  atorvastatin 20 mg     Type 2 DM-  Home BG readings stays in the 1-teens  Hypoglycemia:  None  On statin: Atorvastatin 10 mg  Comorbid: HLD, HTN morbid obesity  Followed by endocrine: No  Current treatment:  Metformin  mg daily     Hypertension-  Symptoms:  fatigue  BP readings at home are in the 1-teens systolic  Comorbid:  Type 2 DM, HLD, morbid obesity  Current treatment: metoprolol 25 mg daily, Losartan 100 mg daily     Health Maintenance:  COVID-19 vac - due for booster  Diabetic eye exam - done last week by Michi Benton, patient to have results faxed  Diabetic foot exam - defer to next appointment  Mammogram - scheduled on 02/06/2023    Review of Systems   Constitutional:  Negative for chills, fever and malaise/fatigue. Respiratory:  Negative for shortness of breath. Cardiovascular:  Negative for chest pain. Objective:   /72 (BP 1 Location: Left upper arm, BP Patient Position: Sitting, BP Cuff Size: Adult long)   Pulse 77   Temp 98 °F (36.7 °C) (Oral)   Resp 17   Ht 5' 2\" (1.575 m)   Wt 249 lb (112.9 kg)   LMP  (LMP Unknown)   SpO2 100%   BMI 45.54 kg/m²      Physical Exam  Vitals and nursing note reviewed. Constitutional:       General: She is not in acute distress. Appearance: She is not ill-appearing. HENT:      Head: Normocephalic and atraumatic. Cardiovascular:      Rate and Rhythm: Normal rate and regular rhythm. Pulmonary:      Effort: Pulmonary effort is normal. No respiratory distress. Breath sounds: No wheezing, rhonchi or rales. Skin:     General: Skin is warm and dry. Neurological:      General: No focal deficit present. Mental Status: She is alert and oriented to person, place, and time. Psychiatric:         Mood and Affect: Mood normal.         Thought Content:  Thought content normal.         Judgment: Judgment normal.          MARIO Garcia

## 2023-01-30 NOTE — ASSESSMENT & PLAN NOTE
LDL elevated, goal <70  Continue regimen for now and recheck lipid panel in 6 mos  If LDL goal not achieved in 6 mos, increase atorvastatin to 40 mg

## 2023-02-01 ENCOUNTER — OFFICE VISIT (OUTPATIENT)
Dept: FAMILY MEDICINE CLINIC | Age: 58
End: 2023-02-01
Payer: COMMERCIAL

## 2023-02-01 VITALS
OXYGEN SATURATION: 100 % | SYSTOLIC BLOOD PRESSURE: 107 MMHG | DIASTOLIC BLOOD PRESSURE: 72 MMHG | HEIGHT: 62 IN | WEIGHT: 249 LBS | TEMPERATURE: 98 F | BODY MASS INDEX: 45.82 KG/M2 | RESPIRATION RATE: 17 BRPM | HEART RATE: 77 BPM

## 2023-02-01 DIAGNOSIS — I10 ESSENTIAL HYPERTENSION: ICD-10-CM

## 2023-02-01 DIAGNOSIS — Z71.2 ENCOUNTER TO DISCUSS TEST RESULTS: ICD-10-CM

## 2023-02-01 DIAGNOSIS — E78.5 HYPERLIPIDEMIA ASSOCIATED WITH TYPE 2 DIABETES MELLITUS (HCC): ICD-10-CM

## 2023-02-01 DIAGNOSIS — Z12.31 ENCOUNTER FOR SCREENING MAMMOGRAM FOR MALIGNANT NEOPLASM OF BREAST: ICD-10-CM

## 2023-02-01 DIAGNOSIS — E11.69 HYPERLIPIDEMIA ASSOCIATED WITH TYPE 2 DIABETES MELLITUS (HCC): ICD-10-CM

## 2023-02-01 DIAGNOSIS — E11.8 TYPE 2 DIABETES MELLITUS WITH COMPLICATION, WITHOUT LONG-TERM CURRENT USE OF INSULIN (HCC): Primary | ICD-10-CM

## 2023-02-01 PROCEDURE — 99214 OFFICE O/P EST MOD 30 MIN: CPT | Performed by: NURSE PRACTITIONER

## 2023-02-01 PROCEDURE — 3078F DIAST BP <80 MM HG: CPT | Performed by: NURSE PRACTITIONER

## 2023-02-01 PROCEDURE — 3044F HG A1C LEVEL LT 7.0%: CPT | Performed by: NURSE PRACTITIONER

## 2023-02-01 PROCEDURE — 3074F SYST BP LT 130 MM HG: CPT | Performed by: NURSE PRACTITIONER

## 2023-02-01 NOTE — PROGRESS NOTES
Na Salgado presents today for   Chief Complaint   Patient presents with    Diabetes    Cholesterol Problem    Hypertension    Labs       Is someone accompanying this pt? no    Is the patient using any DME equipment during OV? no    Depression Screening:  3 most recent PHQ Screens 2/1/2023   PHQ Not Done -   Little interest or pleasure in doing things Not at all   Feeling down, depressed, irritable, or hopeless Not at all   Total Score PHQ 2 0       Learning Assessment:  Learning Assessment 1/3/2023   PRIMARY LEARNER Patient   HIGHEST LEVEL OF EDUCATION - PRIMARY LEARNER  GRADUATED HIGH SCHOOL OR GED   BARRIERS PRIMARY LEARNER NONE   CO-LEARNER CAREGIVER No   PRIMARY LANGUAGE ENGLISH    NEED -   LEARNER PREFERENCE PRIMARY DEMONSTRATION   ANSWERED BY patient   RELATIONSHIP SELF       Fall Risk  No flowsheet data found. ADL  No flowsheet data found. Travel Screening:    Travel Screening       Question Response    In the last 10 days, have you been in contact with someone who was confirmed or suspected to have Coronavirus/COVID-19? No / Unsure    Have you had a COVID-19 viral test in the last 10 days? No    Do you have any of the following new or worsening symptoms? None of these    Have you traveled internationally or domestically in the last month? No          Travel History   Travel since 01/01/23    No documented travel since 01/01/23         Health Maintenance reviewed and discussed and ordered per Provider. Health Maintenance Due   Topic Date Due    Eye Exam Retinal or Dilated  Never done    Hepatitis B Vaccine (1 of 3 - Risk 3-dose series) Never done    Foot Exam Q1  09/15/2021    COVID-19 Vaccine (4 - Booster for Moderna series) 01/29/2022    Breast Cancer Screen Mammogram  01/05/2023   . Coordination of Care:  1. Have you been to the ER, urgent care clinic since your last visit? Hospitalized since your last visit? no    2.  Have you seen or consulted any other health care providers outside of the [address] [place] [address] since your last visit? Include any pap smears or colon screening.  no

## 2023-02-03 DIAGNOSIS — E78.5 HYPERLIPIDEMIA ASSOCIATED WITH TYPE 2 DIABETES MELLITUS (HCC): ICD-10-CM

## 2023-02-03 DIAGNOSIS — E11.69 HYPERLIPIDEMIA ASSOCIATED WITH TYPE 2 DIABETES MELLITUS (HCC): ICD-10-CM

## 2023-02-03 RX ORDER — ATORVASTATIN CALCIUM 20 MG/1
TABLET, FILM COATED ORAL
Qty: 90 TABLET | Refills: 0 | Status: SHIPPED | OUTPATIENT
Start: 2023-02-03

## 2023-02-03 RX ORDER — LOSARTAN POTASSIUM 100 MG/1
TABLET ORAL
Qty: 90 TABLET | Refills: 1 | Status: SHIPPED | OUTPATIENT
Start: 2023-02-03

## 2023-02-05 DIAGNOSIS — E11.8 TYPE 2 DIABETES MELLITUS WITH COMPLICATION, WITHOUT LONG-TERM CURRENT USE OF INSULIN (HCC): Primary | ICD-10-CM

## 2023-02-05 DIAGNOSIS — E11.69 HYPERLIPIDEMIA ASSOCIATED WITH TYPE 2 DIABETES MELLITUS (HCC): ICD-10-CM

## 2023-02-05 DIAGNOSIS — I10 ESSENTIAL HYPERTENSION: Primary | ICD-10-CM

## 2023-02-05 DIAGNOSIS — I10 ESSENTIAL HYPERTENSION: ICD-10-CM

## 2023-02-05 DIAGNOSIS — E11.69 HYPERLIPIDEMIA ASSOCIATED WITH TYPE 2 DIABETES MELLITUS (HCC): Primary | ICD-10-CM

## 2023-02-05 DIAGNOSIS — E78.5 HYPERLIPIDEMIA ASSOCIATED WITH TYPE 2 DIABETES MELLITUS (HCC): ICD-10-CM

## 2023-02-05 DIAGNOSIS — E78.5 HYPERLIPIDEMIA ASSOCIATED WITH TYPE 2 DIABETES MELLITUS (HCC): Primary | ICD-10-CM

## 2023-02-06 ENCOUNTER — HOSPITAL ENCOUNTER (OUTPATIENT)
Dept: MAMMOGRAPHY | Age: 58
Discharge: HOME | End: 2023-02-06
Attending: NURSE PRACTITIONER
Payer: COMMERCIAL

## 2023-02-06 DIAGNOSIS — Z12.31 ENCOUNTER FOR SCREENING MAMMOGRAM FOR MALIGNANT NEOPLASM OF BREAST: ICD-10-CM

## 2023-02-06 PROCEDURE — 77067 SCR MAMMO BI INCL CAD: CPT

## 2023-02-07 DIAGNOSIS — E11.8 TYPE 2 DIABETES MELLITUS WITH COMPLICATION, WITHOUT LONG-TERM CURRENT USE OF INSULIN (HCC): Primary | ICD-10-CM

## 2023-02-28 ENCOUNTER — OFFICE VISIT (OUTPATIENT)
Age: 58
End: 2023-02-28
Payer: COMMERCIAL

## 2023-02-28 VITALS
WEIGHT: 251 LBS | DIASTOLIC BLOOD PRESSURE: 60 MMHG | BODY MASS INDEX: 46.19 KG/M2 | HEIGHT: 62 IN | SYSTOLIC BLOOD PRESSURE: 122 MMHG | HEART RATE: 69 BPM | OXYGEN SATURATION: 98 %

## 2023-02-28 DIAGNOSIS — E66.01 MORBID (SEVERE) OBESITY DUE TO EXCESS CALORIES (HCC): ICD-10-CM

## 2023-02-28 DIAGNOSIS — I34.0 NONRHEUMATIC MITRAL (VALVE) INSUFFICIENCY: Primary | ICD-10-CM

## 2023-02-28 DIAGNOSIS — I10 ESSENTIAL (PRIMARY) HYPERTENSION: ICD-10-CM

## 2023-02-28 DIAGNOSIS — G47.33 OBSTRUCTIVE SLEEP APNEA (ADULT) (PEDIATRIC): ICD-10-CM

## 2023-02-28 DIAGNOSIS — E78.5 HYPERLIPIDEMIA, UNSPECIFIED HYPERLIPIDEMIA TYPE: ICD-10-CM

## 2023-02-28 PROCEDURE — G8482 FLU IMMUNIZE ORDER/ADMIN: HCPCS | Performed by: INTERNAL MEDICINE

## 2023-02-28 PROCEDURE — G8427 DOCREV CUR MEDS BY ELIG CLIN: HCPCS | Performed by: INTERNAL MEDICINE

## 2023-02-28 PROCEDURE — 3017F COLORECTAL CA SCREEN DOC REV: CPT | Performed by: INTERNAL MEDICINE

## 2023-02-28 PROCEDURE — G8417 CALC BMI ABV UP PARAM F/U: HCPCS | Performed by: INTERNAL MEDICINE

## 2023-02-28 PROCEDURE — 3074F SYST BP LT 130 MM HG: CPT | Performed by: INTERNAL MEDICINE

## 2023-02-28 PROCEDURE — 99214 OFFICE O/P EST MOD 30 MIN: CPT | Performed by: INTERNAL MEDICINE

## 2023-02-28 PROCEDURE — 3078F DIAST BP <80 MM HG: CPT | Performed by: INTERNAL MEDICINE

## 2023-02-28 PROCEDURE — 1036F TOBACCO NON-USER: CPT | Performed by: INTERNAL MEDICINE

## 2023-02-28 RX ORDER — METOPROLOL SUCCINATE 25 MG/1
TABLET, EXTENDED RELEASE ORAL
Qty: 90 TABLET | Refills: 3 | Status: SHIPPED | OUTPATIENT
Start: 2023-02-28

## 2023-02-28 NOTE — PROGRESS NOTES
1. Have you been to the ER, urgent care clinic since your last visit? Hospitalized since your last visit? No    2. Have you seen or consulted any other health care providers outside of the [address] [place] [address] since your last visit? Include any pap smears or colon screening. no     3. Do you need any refills today?    no

## 2023-06-02 DIAGNOSIS — E11.8 TYPE 2 DIABETES MELLITUS WITH UNSPECIFIED COMPLICATIONS (HCC): ICD-10-CM

## 2023-06-02 RX ORDER — METFORMIN HYDROCHLORIDE 500 MG/1
TABLET, EXTENDED RELEASE ORAL
Qty: 90 TABLET | Refills: 1 | Status: SHIPPED | OUTPATIENT
Start: 2023-06-02

## 2023-07-28 PROBLEM — Z00.00 ANNUAL PHYSICAL EXAM: Status: ACTIVE | Noted: 2023-07-28

## 2023-08-10 ENCOUNTER — HOSPITAL ENCOUNTER (OUTPATIENT)
Facility: HOSPITAL | Age: 58
Discharge: HOME | End: 2023-08-13
Payer: COMMERCIAL

## 2023-08-10 ENCOUNTER — TELEPHONE (OUTPATIENT)
Facility: CLINIC | Age: 58
End: 2023-08-10

## 2023-08-10 LAB
BASOPHILS # BLD: 0 K/UL (ref 0–0.1)
BASOPHILS NFR BLD: 0 % (ref 0–2)
CHOLEST SERPL-MCNC: 153 MG/DL
DIFFERENTIAL METHOD BLD: ABNORMAL
EOSINOPHIL # BLD: 0.2 K/UL (ref 0–0.4)
EOSINOPHIL NFR BLD: 2 % (ref 0–5)
ERYTHROCYTE [DISTWIDTH] IN BLOOD BY AUTOMATED COUNT: 14.6 % (ref 11.6–14.5)
EST. AVERAGE GLUCOSE BLD GHB EST-MCNC: 137 MG/DL
HBA1C MFR BLD: 6.4 % (ref 4.2–5.6)
HCT VFR BLD AUTO: 37.6 % (ref 35–45)
HDLC SERPL-MCNC: 55 MG/DL (ref 40–60)
HDLC SERPL: 2.8 (ref 0–5)
HGB BLD-MCNC: 11.1 G/DL (ref 12–16)
IMM GRANULOCYTES # BLD AUTO: 0 K/UL (ref 0–0.04)
IMM GRANULOCYTES NFR BLD AUTO: 0 % (ref 0–0.5)
LDLC SERPL CALC-MCNC: 78.4 MG/DL (ref 0–100)
LIPID PANEL: NORMAL
LYMPHOCYTES # BLD: 2.8 K/UL (ref 0.9–3.6)
LYMPHOCYTES NFR BLD: 39 % (ref 21–52)
MCH RBC QN AUTO: 24.5 PG (ref 24–34)
MCHC RBC AUTO-ENTMCNC: 29.5 G/DL (ref 31–37)
MCV RBC AUTO: 83 FL (ref 78–100)
MONOCYTES # BLD: 0.5 K/UL (ref 0.05–1.2)
MONOCYTES NFR BLD: 7 % (ref 3–10)
NEUTS SEG # BLD: 3.6 K/UL (ref 1.8–8)
NEUTS SEG NFR BLD: 51 % (ref 40–73)
NRBC # BLD: 0 K/UL (ref 0–0.01)
NRBC BLD-RTO: 0 PER 100 WBC
PLATELET # BLD AUTO: 313 K/UL (ref 135–420)
PMV BLD AUTO: 10.1 FL (ref 9.2–11.8)
RBC # BLD AUTO: 4.53 M/UL (ref 4.2–5.3)
TRIGL SERPL-MCNC: 98 MG/DL
VLDLC SERPL CALC-MCNC: 19.6 MG/DL
WBC # BLD AUTO: 7.1 K/UL (ref 4.6–13.2)

## 2023-08-10 PROCEDURE — 36415 COLL VENOUS BLD VENIPUNCTURE: CPT

## 2023-08-10 PROCEDURE — 80061 LIPID PANEL: CPT

## 2023-08-10 PROCEDURE — 83036 HEMOGLOBIN GLYCOSYLATED A1C: CPT

## 2023-08-10 PROCEDURE — 85025 COMPLETE CBC W/AUTO DIFF WBC: CPT

## 2023-08-10 ASSESSMENT — ENCOUNTER SYMPTOMS
SHORTNESS OF BREATH: 0
DIARRHEA: 0
ABDOMINAL PAIN: 0
VOMITING: 0
NAUSEA: 0
CHEST TIGHTNESS: 0
BLOOD IN STOOL: 0
CONSTIPATION: 0
COUGH: 0

## 2023-08-10 NOTE — TELEPHONE ENCOUNTER
Please call [facility] and obtain notes with recommendations for colonoscopy repeat that was done in 2015. Thank you!

## 2023-08-10 NOTE — ASSESSMENT & PLAN NOTE
Physical activity for a total of 150 minutes per week is recommended, drink plenty of water. Reduce CHO intake, such as white pastas, white rice, white breads. Avoid fried foods, and eat more green, leafy vegetables, whole grains, and lean proteins.   Discussed recommended screenings and vaccines, advised to complete fasting labs

## 2023-08-10 NOTE — TELEPHONE ENCOUNTER
Spoke w/ Barb at [facility] and next recommended colonoscopy is for 2025. Requested office notes , report. No pathology done.     Colonoscopy done 11/2/2015  Last note: 9/1/2015

## 2023-08-14 ENCOUNTER — OFFICE VISIT (OUTPATIENT)
Facility: CLINIC | Age: 58
End: 2023-08-14
Payer: COMMERCIAL

## 2023-08-14 VITALS
TEMPERATURE: 98.1 F | DIASTOLIC BLOOD PRESSURE: 74 MMHG | OXYGEN SATURATION: 99 % | BODY MASS INDEX: 49.02 KG/M2 | SYSTOLIC BLOOD PRESSURE: 127 MMHG | HEART RATE: 61 BPM | WEIGHT: 268 LBS

## 2023-08-14 DIAGNOSIS — G47.33 OSA ON CPAP: ICD-10-CM

## 2023-08-14 DIAGNOSIS — Z00.00 ANNUAL PHYSICAL EXAM: Primary | ICD-10-CM

## 2023-08-14 DIAGNOSIS — E11.69 HYPERLIPIDEMIA ASSOCIATED WITH TYPE 2 DIABETES MELLITUS (HCC): ICD-10-CM

## 2023-08-14 DIAGNOSIS — E11.8 TYPE 2 DIABETES MELLITUS WITH COMPLICATION, WITHOUT LONG-TERM CURRENT USE OF INSULIN (HCC): ICD-10-CM

## 2023-08-14 DIAGNOSIS — E78.5 HYPERLIPIDEMIA ASSOCIATED WITH TYPE 2 DIABETES MELLITUS (HCC): ICD-10-CM

## 2023-08-14 DIAGNOSIS — Z99.89 OSA ON CPAP: ICD-10-CM

## 2023-08-14 DIAGNOSIS — I10 ESSENTIAL HYPERTENSION: ICD-10-CM

## 2023-08-14 DIAGNOSIS — E66.01 MORBID OBESITY WITH BMI OF 45.0-49.9, ADULT (HCC): ICD-10-CM

## 2023-08-14 DIAGNOSIS — Z71.2 ENCOUNTER TO DISCUSS TEST RESULTS: ICD-10-CM

## 2023-08-14 PROCEDURE — 3078F DIAST BP <80 MM HG: CPT | Performed by: NURSE PRACTITIONER

## 2023-08-14 PROCEDURE — 3074F SYST BP LT 130 MM HG: CPT | Performed by: NURSE PRACTITIONER

## 2023-08-14 PROCEDURE — 99396 PREV VISIT EST AGE 40-64: CPT | Performed by: NURSE PRACTITIONER

## 2023-08-14 RX ORDER — LOSARTAN POTASSIUM 100 MG/1
100 TABLET ORAL DAILY
Qty: 90 TABLET | Refills: 1 | Status: SHIPPED | OUTPATIENT
Start: 2023-08-14

## 2023-08-14 RX ORDER — METOPROLOL SUCCINATE 25 MG/1
TABLET, EXTENDED RELEASE ORAL
Qty: 90 TABLET | Refills: 1 | Status: SHIPPED | OUTPATIENT
Start: 2023-08-14

## 2023-08-14 RX ORDER — ATORVASTATIN CALCIUM 20 MG/1
20 TABLET, FILM COATED ORAL DAILY
Qty: 90 TABLET | Refills: 1 | Status: SHIPPED | OUTPATIENT
Start: 2023-08-14

## 2023-08-14 RX ORDER — METFORMIN HYDROCHLORIDE 500 MG/1
TABLET, EXTENDED RELEASE ORAL
Qty: 90 TABLET | Refills: 1 | Status: SHIPPED | OUTPATIENT
Start: 2023-08-14

## 2023-08-14 SDOH — ECONOMIC STABILITY: INCOME INSECURITY: HOW HARD IS IT FOR YOU TO PAY FOR THE VERY BASICS LIKE FOOD, HOUSING, MEDICAL CARE, AND HEATING?: SOMEWHAT HARD

## 2023-08-14 SDOH — ECONOMIC STABILITY: FOOD INSECURITY: WITHIN THE PAST 12 MONTHS, THE FOOD YOU BOUGHT JUST DIDN'T LAST AND YOU DIDN'T HAVE MONEY TO GET MORE.: NEVER TRUE

## 2023-08-14 SDOH — ECONOMIC STABILITY: FOOD INSECURITY: WITHIN THE PAST 12 MONTHS, YOU WORRIED THAT YOUR FOOD WOULD RUN OUT BEFORE YOU GOT MONEY TO BUY MORE.: NEVER TRUE

## 2023-08-14 SDOH — ECONOMIC STABILITY: HOUSING INSECURITY
IN THE LAST 12 MONTHS, WAS THERE A TIME WHEN YOU DID NOT HAVE A STEADY PLACE TO SLEEP OR SLEPT IN A SHELTER (INCLUDING NOW)?: NO

## 2023-08-14 ASSESSMENT — PATIENT HEALTH QUESTIONNAIRE - PHQ9
SUM OF ALL RESPONSES TO PHQ QUESTIONS 1-9: 0
2. FEELING DOWN, DEPRESSED OR HOPELESS: 0
SUM OF ALL RESPONSES TO PHQ QUESTIONS 1-9: 0
1. LITTLE INTEREST OR PLEASURE IN DOING THINGS: 0
SUM OF ALL RESPONSES TO PHQ9 QUESTIONS 1 & 2: 0
SUM OF ALL RESPONSES TO PHQ QUESTIONS 1-9: 0
SUM OF ALL RESPONSES TO PHQ QUESTIONS 1-9: 0

## 2023-08-14 NOTE — PROGRESS NOTES
Luis Finn presents today for   Chief Complaint   Patient presents with    Annual Exam    Diabetes    Cholesterol Problem    Hypertension    Sleep Apnea    Obesity    Discuss Labs       Is someone accompanying this pt? no    Is the patient using any DME equipment during OV? no    Depression Screening:  PHQ-9 Questionaire 8/14/2023 2/1/2023 1/3/2023 9/29/2022 7/27/2022 6/29/2022   Little interest or pleasure in doing things 0 0 0 0 0 0   Feeling down, depressed, or hopeless 0 0 0 0 0 0   PHQ-9 Total Score 0 0 0 0 0 0        FRANK 7-Anxiety   No flowsheet data found. Learning Assessment:  No question data found. Fall Risk  No flowsheet data found. Travel Screening:    Travel Screening     No screening recorded since 08/13/23 0000       Travel History   Travel since 07/14/23    No documented travel since 07/14/23          Health Maintenance reviewed and discussed and ordered per Provider. Social Determinants of Health     Tobacco Use: Low Risk     Smoking Tobacco Use: Never    Smokeless Tobacco Use: Never    Passive Exposure: Not on file   Alcohol Use: Not on file   Financial Resource Strain: Medium Risk    Difficulty of Paying Living Expenses: Somewhat hard   Food Insecurity: No Food Insecurity    Worried About Running Out of Food in the Last Year: Never true    Ran Out of Food in the Last Year: Never true   Transportation Needs: Unknown    Lack of Transportation (Medical): Not on file    Lack of Transportation (Non-Medical):  No   Physical Activity: Not on file   Stress: Not on file   Social Connections: Not on file   Intimate Partner Violence: Not on file   Depression: Not at risk    PHQ-2 Score: 0   Housing Stability: Unknown    Unable to Pay for Housing in the Last Year: Not on file    Number of Places Lived in the Last Year: Not on file    Unstable Housing in the Last Year: No        Health Maintenance Due   Topic Date Due    Diabetic retinal exam  Never done    Hepatitis B vaccine (1 of 3 -
Judgment: Judgment normal.      Diabetic foot exam:   Left Foot:   Visual Exam: normal   Pulse DP: 2+ (normal)   Filament test: normal sensation   Vibratory Sensation: normal  Right Foot:   Visual Exam: normal   Pulse DP: 2+ (normal)   Filament test: normal sensation   Vibratory Sensation: normal      Jodi Drake, FNP-C

## 2023-08-15 ENCOUNTER — HOSPITAL ENCOUNTER (OUTPATIENT)
Facility: HOSPITAL | Age: 58
Discharge: HOME | End: 2023-08-13
Payer: COMMERCIAL

## 2023-08-27 PROBLEM — Z00.00 ANNUAL PHYSICAL EXAM: Status: RESOLVED | Noted: 2023-07-28 | Resolved: 2023-08-27

## 2023-08-31 ENCOUNTER — OFFICE VISIT (OUTPATIENT)
Age: 58
End: 2023-08-31
Payer: COMMERCIAL

## 2023-08-31 VITALS
HEART RATE: 62 BPM | SYSTOLIC BLOOD PRESSURE: 126 MMHG | BODY MASS INDEX: 49.13 KG/M2 | HEIGHT: 62 IN | OXYGEN SATURATION: 99 % | DIASTOLIC BLOOD PRESSURE: 80 MMHG | WEIGHT: 267 LBS

## 2023-08-31 DIAGNOSIS — I10 ESSENTIAL (PRIMARY) HYPERTENSION: ICD-10-CM

## 2023-08-31 DIAGNOSIS — E11.69 HYPERLIPIDEMIA ASSOCIATED WITH TYPE 2 DIABETES MELLITUS (HCC): ICD-10-CM

## 2023-08-31 DIAGNOSIS — I34.0 NONRHEUMATIC MITRAL (VALVE) INSUFFICIENCY: Primary | ICD-10-CM

## 2023-08-31 DIAGNOSIS — I10 ESSENTIAL HYPERTENSION: ICD-10-CM

## 2023-08-31 DIAGNOSIS — E78.5 HYPERLIPIDEMIA ASSOCIATED WITH TYPE 2 DIABETES MELLITUS (HCC): ICD-10-CM

## 2023-08-31 DIAGNOSIS — E78.5 HYPERLIPIDEMIA, UNSPECIFIED HYPERLIPIDEMIA TYPE: ICD-10-CM

## 2023-08-31 DIAGNOSIS — G47.33 OBSTRUCTIVE SLEEP APNEA (ADULT) (PEDIATRIC): ICD-10-CM

## 2023-08-31 DIAGNOSIS — E66.01 MORBID (SEVERE) OBESITY DUE TO EXCESS CALORIES (HCC): ICD-10-CM

## 2023-08-31 PROCEDURE — G8417 CALC BMI ABV UP PARAM F/U: HCPCS | Performed by: INTERNAL MEDICINE

## 2023-08-31 PROCEDURE — 3017F COLORECTAL CA SCREEN DOC REV: CPT | Performed by: INTERNAL MEDICINE

## 2023-08-31 PROCEDURE — 99214 OFFICE O/P EST MOD 30 MIN: CPT | Performed by: INTERNAL MEDICINE

## 2023-08-31 PROCEDURE — 3074F SYST BP LT 130 MM HG: CPT | Performed by: INTERNAL MEDICINE

## 2023-08-31 PROCEDURE — 3078F DIAST BP <80 MM HG: CPT | Performed by: INTERNAL MEDICINE

## 2023-08-31 PROCEDURE — 3044F HG A1C LEVEL LT 7.0%: CPT | Performed by: INTERNAL MEDICINE

## 2023-08-31 PROCEDURE — 1036F TOBACCO NON-USER: CPT | Performed by: INTERNAL MEDICINE

## 2023-08-31 PROCEDURE — 2022F DILAT RTA XM EVC RTNOPTHY: CPT | Performed by: INTERNAL MEDICINE

## 2023-08-31 PROCEDURE — G8427 DOCREV CUR MEDS BY ELIG CLIN: HCPCS | Performed by: INTERNAL MEDICINE

## 2023-08-31 RX ORDER — METOPROLOL SUCCINATE 25 MG/1
TABLET, EXTENDED RELEASE ORAL
Qty: 90 TABLET | Refills: 1 | Status: SHIPPED | OUTPATIENT
Start: 2023-08-31

## 2023-08-31 RX ORDER — ATORVASTATIN CALCIUM 20 MG/1
20 TABLET, FILM COATED ORAL DAILY
Qty: 90 TABLET | Refills: 1 | Status: SHIPPED | OUTPATIENT
Start: 2023-08-31

## 2023-08-31 RX ORDER — LOSARTAN POTASSIUM 100 MG/1
100 TABLET ORAL DAILY
Qty: 90 TABLET | Refills: 1 | Status: SHIPPED | OUTPATIENT
Start: 2023-08-31

## 2023-08-31 NOTE — PROGRESS NOTES
HISTORY OF PRESENT ILLNESS  Lucie Pereyra is a 62 y.o. female. 10/2019  Patient is seen today for new patient evaluation. She is referred here for palpitation. She has a history of hypertension, hyperlipidemia. 3/2022  Patient seen today for follow-up. She has noted increased palpitation. She is not using CPAP. Denies any chest pain. 4/2022  Patient seen in f/u for palpitations and echocardiogram results. She reports symptoms are improved with addition of beta blocker. She denies chest pain, shortness of breath or edema. Follow-up  The history is provided by the patient and medical records. Valvular Heart Disease  The history is provided by the patient. This is a chronic problem. The problem occurs constantly. The problem has not changed since onset. Hypertension  The history is provided by the patient. This is a chronic problem. The problem occurs constantly. The problem has not changed since onset. Palpitations   The history is provided by the patient. This is a recurrent problem. Episode onset: 2 months. The problem has been gradually worsening. The problem occurs every several days. The problem is associated with nothing. Pertinent negatives include no fever, no claudication, no orthopnea, no PND, no nausea, no vomiting, no dizziness, no weakness, no cough, no hemoptysis and no sputum production. Her past medical history is significant for hypertension. Review of Systems   Constitutional: Negative for chills and fever. HENT: Negative for nosebleeds. Eyes: Negative for blurred vision and double vision. Respiratory: Negative for cough, hemoptysis, sputum production and wheezing. Cardiovascular: Positive for palpitations. Negative for orthopnea, claudication, leg swelling and PND. Gastrointestinal: Negative for heartburn, nausea and vomiting. Musculoskeletal: Negative for myalgias. Skin: Negative for rash. Neurological: Negative for dizziness and weakness.

## 2023-08-31 NOTE — PROGRESS NOTES
1. Have you been to the ER, urgent care clinic since your last visit? Hospitalized since your last visit?     no      2. Where do you normally have your labs drawn? 3. Do you need any refills today?    yes

## 2023-10-27 ENCOUNTER — NURSE ONLY (OUTPATIENT)
Facility: CLINIC | Age: 58
End: 2023-10-27
Payer: COMMERCIAL

## 2023-10-27 DIAGNOSIS — Z23 NEEDS FLU SHOT: Primary | ICD-10-CM

## 2023-10-27 PROCEDURE — 90471 IMMUNIZATION ADMIN: CPT | Performed by: NURSE PRACTITIONER

## 2023-10-27 PROCEDURE — 90674 CCIIV4 VAC NO PRSV 0.5 ML IM: CPT | Performed by: NURSE PRACTITIONER

## 2023-10-27 NOTE — PROGRESS NOTES
Obtained consent from patient. Per verbal order from NP Edu Injection of FLU Regular  administered. Verified by me and Aditya Porras  that this is the correct immunization/injection. Patient observed for 15 minutes with no adverse reaction.

## 2024-02-01 DIAGNOSIS — E11.8 TYPE 2 DIABETES MELLITUS WITH COMPLICATION, WITHOUT LONG-TERM CURRENT USE OF INSULIN (HCC): ICD-10-CM

## 2024-02-02 RX ORDER — METFORMIN HYDROCHLORIDE 500 MG/1
TABLET, EXTENDED RELEASE ORAL
Qty: 90 TABLET | Refills: 1 | Status: SHIPPED | OUTPATIENT
Start: 2024-02-02

## 2024-02-02 NOTE — TELEPHONE ENCOUNTER
Lab Results   Component Value Date     01/23/2023    K 4.6 01/23/2023     01/23/2023    CO2 21 01/23/2023    BUN 9 01/23/2023    CREATININE 0.77 01/23/2023    GLUCOSE 80 01/23/2023    CALCIUM 9.4 01/23/2023    PROT 6.5 01/23/2023    LABALBU 4.0 01/23/2023    BILITOT 0.3 01/23/2023    ALKPHOS 123 (H) 01/23/2023    AST 16 01/23/2023    ALT 12 01/23/2023    LABGLOM 89 01/23/2023    GFRAA 117 09/27/2021    AGRATIO 1.6 01/23/2023    GLOB 3.4 04/10/2020

## 2024-02-02 NOTE — TELEPHONE ENCOUNTER
Last Filled: 8/14/23    Last appt: 8/14/23    Next appt:2/14/24    Labs:8/10/23    UDS:    CSA:    Additional Notes:

## 2024-02-10 ENCOUNTER — HOSPITAL ENCOUNTER (OUTPATIENT)
Facility: HOSPITAL | Age: 59
Discharge: HOME | End: 2024-02-13
Payer: COMMERCIAL

## 2024-02-10 DIAGNOSIS — E78.5 HYPERLIPIDEMIA ASSOCIATED WITH TYPE 2 DIABETES MELLITUS (HCC): ICD-10-CM

## 2024-02-10 DIAGNOSIS — E11.8 TYPE 2 DIABETES MELLITUS WITH COMPLICATION, WITHOUT LONG-TERM CURRENT USE OF INSULIN (HCC): ICD-10-CM

## 2024-02-10 DIAGNOSIS — E11.69 HYPERLIPIDEMIA ASSOCIATED WITH TYPE 2 DIABETES MELLITUS (HCC): ICD-10-CM

## 2024-02-10 DIAGNOSIS — G47.33 OSA ON CPAP: ICD-10-CM

## 2024-02-10 DIAGNOSIS — I10 ESSENTIAL HYPERTENSION: ICD-10-CM

## 2024-02-10 LAB
ALBUMIN SERPL-MCNC: 3.5 G/DL (ref 3.4–5)
ALBUMIN/GLOB SERPL: 0.9 (ref 0.8–1.7)
ALP SERPL-CCNC: 131 U/L (ref 45–117)
ALT SERPL-CCNC: 17 U/L (ref 13–56)
ANION GAP SERPL CALC-SCNC: 4 MMOL/L (ref 3–18)
AST SERPL-CCNC: 10 U/L (ref 10–38)
BASOPHILS # BLD: 0 K/UL (ref 0–0.1)
BASOPHILS NFR BLD: 0 % (ref 0–2)
BILIRUB SERPL-MCNC: 0.3 MG/DL (ref 0.2–1)
BUN SERPL-MCNC: 12 MG/DL (ref 7–18)
BUN/CREAT SERPL: 16 (ref 12–20)
CALCIUM SERPL-MCNC: 9.3 MG/DL (ref 8.5–10.1)
CHLORIDE SERPL-SCNC: 108 MMOL/L (ref 100–111)
CHOLEST SERPL-MCNC: 145 MG/DL
CO2 SERPL-SCNC: 27 MMOL/L (ref 21–32)
CREAT SERPL-MCNC: 0.77 MG/DL (ref 0.6–1.3)
DIFFERENTIAL METHOD BLD: ABNORMAL
EOSINOPHIL # BLD: 0.2 K/UL (ref 0–0.4)
EOSINOPHIL NFR BLD: 3 % (ref 0–5)
ERYTHROCYTE [DISTWIDTH] IN BLOOD BY AUTOMATED COUNT: 14.6 % (ref 11.6–14.5)
EST. AVERAGE GLUCOSE BLD GHB EST-MCNC: 128 MG/DL
GLOBULIN SER CALC-MCNC: 3.7 G/DL (ref 2–4)
GLUCOSE SERPL-MCNC: 101 MG/DL (ref 74–99)
HBA1C MFR BLD: 6.1 % (ref 4.2–5.6)
HCT VFR BLD AUTO: 40.7 % (ref 35–45)
HDLC SERPL-MCNC: 53 MG/DL (ref 40–60)
HDLC SERPL: 2.7 (ref 0–5)
HGB BLD-MCNC: 12 G/DL (ref 12–16)
IMM GRANULOCYTES # BLD AUTO: 0 K/UL (ref 0–0.04)
IMM GRANULOCYTES NFR BLD AUTO: 0 % (ref 0–0.5)
LDLC SERPL CALC-MCNC: 77.2 MG/DL (ref 0–100)
LIPID PANEL: NORMAL
LYMPHOCYTES # BLD: 2.6 K/UL (ref 0.9–3.6)
LYMPHOCYTES NFR BLD: 44 % (ref 21–52)
MCH RBC QN AUTO: 24.4 PG (ref 24–34)
MCHC RBC AUTO-ENTMCNC: 29.5 G/DL (ref 31–37)
MCV RBC AUTO: 82.9 FL (ref 78–100)
MONOCYTES # BLD: 0.4 K/UL (ref 0.05–1.2)
MONOCYTES NFR BLD: 7 % (ref 3–10)
NEUTS SEG # BLD: 2.8 K/UL (ref 1.8–8)
NEUTS SEG NFR BLD: 47 % (ref 40–73)
NRBC # BLD: 0 K/UL (ref 0–0.01)
NRBC BLD-RTO: 0 PER 100 WBC
PLATELET # BLD AUTO: 342 K/UL (ref 135–420)
PMV BLD AUTO: 10.2 FL (ref 9.2–11.8)
POTASSIUM SERPL-SCNC: 4.6 MMOL/L (ref 3.5–5.5)
PROT SERPL-MCNC: 7.2 G/DL (ref 6.4–8.2)
RBC # BLD AUTO: 4.91 M/UL (ref 4.2–5.3)
SODIUM SERPL-SCNC: 139 MMOL/L (ref 136–145)
TRIGL SERPL-MCNC: 74 MG/DL
VLDLC SERPL CALC-MCNC: 14.8 MG/DL
WBC # BLD AUTO: 6 K/UL (ref 4.6–13.2)

## 2024-02-10 PROCEDURE — 83036 HEMOGLOBIN GLYCOSYLATED A1C: CPT

## 2024-02-10 PROCEDURE — 36415 COLL VENOUS BLD VENIPUNCTURE: CPT

## 2024-02-10 PROCEDURE — 85025 COMPLETE CBC W/AUTO DIFF WBC: CPT

## 2024-02-10 PROCEDURE — 80053 COMPREHEN METABOLIC PANEL: CPT

## 2024-02-10 PROCEDURE — 80061 LIPID PANEL: CPT

## 2024-02-14 ENCOUNTER — OFFICE VISIT (OUTPATIENT)
Facility: CLINIC | Age: 59
End: 2024-02-14
Payer: COMMERCIAL

## 2024-02-14 ENCOUNTER — HOSPITAL ENCOUNTER (OUTPATIENT)
Facility: HOSPITAL | Age: 59
Setting detail: SPECIMEN
Discharge: HOME | End: 2024-02-17
Payer: COMMERCIAL

## 2024-02-14 VITALS
WEIGHT: 263.2 LBS | HEART RATE: 67 BPM | TEMPERATURE: 97.7 F | RESPIRATION RATE: 14 BRPM | SYSTOLIC BLOOD PRESSURE: 127 MMHG | BODY MASS INDEX: 48.14 KG/M2 | OXYGEN SATURATION: 99 % | DIASTOLIC BLOOD PRESSURE: 69 MMHG

## 2024-02-14 DIAGNOSIS — Z12.31 ENCOUNTER FOR SCREENING MAMMOGRAM FOR MALIGNANT NEOPLASM OF BREAST: ICD-10-CM

## 2024-02-14 DIAGNOSIS — E78.5 HYPERLIPIDEMIA ASSOCIATED WITH TYPE 2 DIABETES MELLITUS (HCC): ICD-10-CM

## 2024-02-14 DIAGNOSIS — Z71.2 ENCOUNTER TO DISCUSS TEST RESULTS: ICD-10-CM

## 2024-02-14 DIAGNOSIS — E66.01 MORBID OBESITY WITH BMI OF 45.0-49.9, ADULT (HCC): ICD-10-CM

## 2024-02-14 DIAGNOSIS — I10 ESSENTIAL HYPERTENSION: ICD-10-CM

## 2024-02-14 DIAGNOSIS — E11.69 HYPERLIPIDEMIA ASSOCIATED WITH TYPE 2 DIABETES MELLITUS (HCC): ICD-10-CM

## 2024-02-14 DIAGNOSIS — E11.8 TYPE 2 DIABETES MELLITUS WITH COMPLICATION, WITHOUT LONG-TERM CURRENT USE OF INSULIN (HCC): Primary | ICD-10-CM

## 2024-02-14 DIAGNOSIS — E11.8 TYPE 2 DIABETES MELLITUS WITH COMPLICATION, WITHOUT LONG-TERM CURRENT USE OF INSULIN (HCC): ICD-10-CM

## 2024-02-14 LAB
CREAT UR-MCNC: 131 MG/DL (ref 30–125)
MICROALBUMIN UR-MCNC: 1.5 MG/DL (ref 0–3)
MICROALBUMIN/CREAT UR-RTO: 11 MG/G (ref 0–30)

## 2024-02-14 PROCEDURE — 2022F DILAT RTA XM EVC RTNOPTHY: CPT | Performed by: NURSE PRACTITIONER

## 2024-02-14 PROCEDURE — 82043 UR ALBUMIN QUANTITATIVE: CPT

## 2024-02-14 PROCEDURE — 3078F DIAST BP <80 MM HG: CPT | Performed by: NURSE PRACTITIONER

## 2024-02-14 PROCEDURE — G8417 CALC BMI ABV UP PARAM F/U: HCPCS | Performed by: NURSE PRACTITIONER

## 2024-02-14 PROCEDURE — 3074F SYST BP LT 130 MM HG: CPT | Performed by: NURSE PRACTITIONER

## 2024-02-14 PROCEDURE — 82570 ASSAY OF URINE CREATININE: CPT

## 2024-02-14 PROCEDURE — 1036F TOBACCO NON-USER: CPT | Performed by: NURSE PRACTITIONER

## 2024-02-14 PROCEDURE — 99214 OFFICE O/P EST MOD 30 MIN: CPT | Performed by: NURSE PRACTITIONER

## 2024-02-14 PROCEDURE — G8427 DOCREV CUR MEDS BY ELIG CLIN: HCPCS | Performed by: NURSE PRACTITIONER

## 2024-02-14 PROCEDURE — 3044F HG A1C LEVEL LT 7.0%: CPT | Performed by: NURSE PRACTITIONER

## 2024-02-14 PROCEDURE — G8482 FLU IMMUNIZE ORDER/ADMIN: HCPCS | Performed by: NURSE PRACTITIONER

## 2024-02-14 PROCEDURE — 3017F COLORECTAL CA SCREEN DOC REV: CPT | Performed by: NURSE PRACTITIONER

## 2024-02-14 NOTE — PROGRESS NOTES
Chief Complaint   Patient presents with    Diabetes    Cholesterol Problem    Hypertension    Discuss Labs     Assessment & Plan:     1. Type 2 diabetes mellitus with complication, without long-term current use of insulin ([facility])  Assessment & Plan:  A1c remains at goal of <7, continue Metformin  mg daily  Recheck A1c in 6 mos  Orders:  -     Microalbumin / Creatinine Urine Ratio; Future  -     Comprehensive Metabolic Panel; Future  -     Lipid Panel; Future  -     Hemoglobin A1C; Future  2. Hyperlipidemia associated with type 2 diabetes mellitus ([facility])  Assessment & Plan:  LDL trending down, goal <70  Continue atorvastatin 20 mg qhs  Recheck lipid panel in 6 mos  Orders:  -     Comprehensive Metabolic Panel; Future  -     Lipid Panel; Future  3. Essential hypertension  Assessment & Plan:  BP at goal, <130/80  Continue Losartan 100 mg daily, metoprolol Xl 25 mg daily  Orders:  -     Comprehensive Metabolic Panel; Future  -     Lipid Panel; Future  4. Morbid obesity with BMI of 45.0-49.9, adult ([facility])  Assessment & Plan:  Lifestyle modifications advised  5. Encounter for screening mammogram for malignant neoplasm of breast  -     [facility] MARK DIGITAL SCREEN BILATERAL; Future  6. Encounter to discuss test results  Comments:  Fasting labs reviewed in detail with patient    Follow-up and Dispositions    Return in about 6 months (around 8/14/2024) for PHYSICAL, diabetes, cholesterol, blood pressure, lab results.       Subjective:     HPI    Hyperlipidemia  Compliant with meds  Comorbid: type 2 DM, HTN, morbid obesity  Current treatment:  atorvastatin 20 mg     Type 2 DM-  Home BG readings stays in the 1-teens  Hypoglycemia:  None  On statin: Atorvastatin 10 mg  Comorbid: HLD, HTN morbid obesity  Followed by endocrine: No  Current treatment:  Metformin  mg daily     Hypertension-  Symptoms:  fatigue  BP readings at home are in the 1-teens systolic  Comorbid:  Type 2 DM, HLD, morbid obesity  Current treatment: 
been to the ER, urgent care clinic since your last visit?  Hospitalized since your last visit?\"    NO    “Have you seen or consulted any other health care providers outside of [facility] since your last visit?”    NO       Have you had a mammogram?”   NO pt has scheduled for march 2024

## 2024-02-15 ENCOUNTER — OFFICE VISIT (OUTPATIENT)
Age: 59
End: 2024-02-15
Payer: COMMERCIAL

## 2024-02-15 VITALS
OXYGEN SATURATION: 99 % | DIASTOLIC BLOOD PRESSURE: 72 MMHG | SYSTOLIC BLOOD PRESSURE: 143 MMHG | BODY MASS INDEX: 48.4 KG/M2 | WEIGHT: 263 LBS | HEIGHT: 62 IN | HEART RATE: 72 BPM

## 2024-02-15 DIAGNOSIS — R00.2 PALPITATIONS: ICD-10-CM

## 2024-02-15 DIAGNOSIS — I10 ESSENTIAL (PRIMARY) HYPERTENSION: ICD-10-CM

## 2024-02-15 DIAGNOSIS — E78.5 HYPERLIPIDEMIA, UNSPECIFIED HYPERLIPIDEMIA TYPE: ICD-10-CM

## 2024-02-15 DIAGNOSIS — E78.5 HYPERLIPIDEMIA ASSOCIATED WITH TYPE 2 DIABETES MELLITUS (HCC): ICD-10-CM

## 2024-02-15 DIAGNOSIS — E11.69 HYPERLIPIDEMIA ASSOCIATED WITH TYPE 2 DIABETES MELLITUS (HCC): ICD-10-CM

## 2024-02-15 DIAGNOSIS — I10 ESSENTIAL HYPERTENSION: ICD-10-CM

## 2024-02-15 DIAGNOSIS — G47.33 OBSTRUCTIVE SLEEP APNEA (ADULT) (PEDIATRIC): ICD-10-CM

## 2024-02-15 DIAGNOSIS — I34.0 NONRHEUMATIC MITRAL (VALVE) INSUFFICIENCY: Primary | ICD-10-CM

## 2024-02-15 PROCEDURE — 1036F TOBACCO NON-USER: CPT | Performed by: INTERNAL MEDICINE

## 2024-02-15 PROCEDURE — G8428 CUR MEDS NOT DOCUMENT: HCPCS | Performed by: INTERNAL MEDICINE

## 2024-02-15 PROCEDURE — 3044F HG A1C LEVEL LT 7.0%: CPT | Performed by: INTERNAL MEDICINE

## 2024-02-15 PROCEDURE — G8417 CALC BMI ABV UP PARAM F/U: HCPCS | Performed by: INTERNAL MEDICINE

## 2024-02-15 PROCEDURE — 2022F DILAT RTA XM EVC RTNOPTHY: CPT | Performed by: INTERNAL MEDICINE

## 2024-02-15 PROCEDURE — 3077F SYST BP >= 140 MM HG: CPT | Performed by: INTERNAL MEDICINE

## 2024-02-15 PROCEDURE — G8482 FLU IMMUNIZE ORDER/ADMIN: HCPCS | Performed by: INTERNAL MEDICINE

## 2024-02-15 PROCEDURE — 3078F DIAST BP <80 MM HG: CPT | Performed by: INTERNAL MEDICINE

## 2024-02-15 PROCEDURE — 3017F COLORECTAL CA SCREEN DOC REV: CPT | Performed by: INTERNAL MEDICINE

## 2024-02-15 PROCEDURE — 99214 OFFICE O/P EST MOD 30 MIN: CPT | Performed by: INTERNAL MEDICINE

## 2024-02-15 RX ORDER — METOPROLOL SUCCINATE 25 MG/1
TABLET, EXTENDED RELEASE ORAL
Qty: 90 TABLET | Refills: 3 | Status: SHIPPED | OUTPATIENT
Start: 2024-02-15

## 2024-02-15 RX ORDER — LOSARTAN POTASSIUM 100 MG/1
100 TABLET ORAL DAILY
Qty: 90 TABLET | Refills: 3 | Status: SHIPPED | OUTPATIENT
Start: 2024-02-15

## 2024-02-15 RX ORDER — ATORVASTATIN CALCIUM 20 MG/1
20 TABLET, FILM COATED ORAL DAILY
Qty: 90 TABLET | Refills: 3 | Status: SHIPPED | OUTPATIENT
Start: 2024-02-15

## 2024-02-15 NOTE — PROGRESS NOTES
1. Have you been to the ER, urgent care clinic since your last visit?  Hospitalized since your last visit?No    2. Have you seen or consulted any other health care providers outside of the [facility] since your last visit?  Include any pap smears or colon screening. No    3.  Since your last visit, have you had any of the following symptoms? no     .         
(pediatric)      continue treatment monitor with sleep physician      4. Hyperlipidemia, unspecified hyperlipidemia type      Continue treatment lab reviewed.      5. Palpitations      Stable on beta-blocker monitor      6. Hyperlipidemia associated with type 2 diabetes mellitus (HCC)  atorvastatin (LIPITOR) 20 MG tablet      7. Essential hypertension  losartan (COZAAR) 100 MG tablet    metoprolol succinate (TOPROL XL) 25 MG extended release tablet          Medications Discontinued During This Encounter   Medication Reason    atorvastatin (LIPITOR) 20 MG tablet REORDER    losartan (COZAAR) 100 MG tablet REORDER    metoprolol succinate (TOPROL XL) 25 MG extended release tablet REORDER             Follow-up and Dispositions    Return in about 6 months (around 8/15/2024).               8/2020  Cardiac status stable.  Symptoms are improving.  Now with new onset diabetes on treatment.    2/2021  Patient with recent increase in palpitation that are improving we will continue to monitor clinically.  Event monitor last year did not show any significant arrhythmia.  Blood pressure stable.  With dietary modification she has seen significant weight loss  9/2021  Cardiac status stable.  Continue current treatment.  Dietary modification  3/2022  Symptoms increased palpitation.  Add Toprol 25 mg a day and monitor.  Check echo for valvular disease and PA pressure.  Depending on symptom improvement consider continuation of Toprol  4/2022  Palpitations have resolved with Toprol XL.  Echo reviewed and discussed with patient, with normal LV function, no evidence of pulmonary hypertension.  Advised patient to resume wearing CPAP nightly.  Will refer to pulmonary for treatment of obstructive sleep apnea.  Weight loss efforts encouraged.  2/2023  Cardiac status stable continue current medical management monitor.  8/2023  Cardiac status stable.  Symptoms are stable, blood pressure controlled continue current medical management monitor.

## 2024-02-27 ENCOUNTER — HOSPITAL ENCOUNTER (OUTPATIENT)
Facility: HOSPITAL | Age: 59
Discharge: HOME | End: 2024-03-01
Payer: COMMERCIAL

## 2024-02-27 VITALS — BODY MASS INDEX: 48.09 KG/M2 | HEIGHT: 62 IN

## 2024-02-27 DIAGNOSIS — Z12.31 ENCOUNTER FOR SCREENING MAMMOGRAM FOR MALIGNANT NEOPLASM OF BREAST: ICD-10-CM

## 2024-02-27 PROCEDURE — 77063 BREAST TOMOSYNTHESIS BI: CPT

## 2024-08-10 DIAGNOSIS — E11.8 TYPE 2 DIABETES MELLITUS WITH COMPLICATION, WITHOUT LONG-TERM CURRENT USE OF INSULIN (HCC): ICD-10-CM

## 2024-08-10 DIAGNOSIS — I10 ESSENTIAL HYPERTENSION: ICD-10-CM

## 2024-08-13 RX ORDER — METOPROLOL SUCCINATE 25 MG/1
TABLET, EXTENDED RELEASE ORAL
Qty: 30 TABLET | Refills: 0 | Status: SHIPPED | OUTPATIENT
Start: 2024-08-13

## 2024-08-13 RX ORDER — METFORMIN HYDROCHLORIDE 500 MG/1
TABLET, EXTENDED RELEASE ORAL
Qty: 30 TABLET | Refills: 0 | Status: SHIPPED | OUTPATIENT
Start: 2024-08-13

## 2024-08-13 NOTE — TELEPHONE ENCOUNTER
Last seen 2/14/2024   Last labs 2/10/24  Last filled  2/2024  Next appointment 8/21/2024     Last Assessment & Plan NoteWritten:Madyson Sorensen NP-C2/14/2024 2:42 PM  BP at goal, <130/80  Continue Losartan 100 mg daily, metoprolol Xl 25 mg daily    Last Assessment & Plan NoteWritten:Madyson Sorensen NP-C2/13/2024 3:06 PM  A1c remains at goal of <7, continue Metformin  mg daily  Recheck A1c in 6 mos    Lab Results   Component Value Date     02/10/2024    K 4.6 02/10/2024     02/10/2024    CO2 27 02/10/2024    BUN 12 02/10/2024    CREATININE 0.77 02/10/2024    GLUCOSE 101 (H) 02/10/2024    CALCIUM 9.3 02/10/2024    BILITOT 0.3 02/10/2024    ALKPHOS 131 (H) 02/10/2024    AST 10 02/10/2024    ALT 17 02/10/2024    LABGLOM >60 02/10/2024    GFRAA 117 09/27/2021    AGRATIO 1.6 01/23/2023    GLOB 3.7 02/10/2024

## 2024-08-25 NOTE — PROGRESS NOTES
HISTORY OF PRESENT ILLNESS  Nelda Dominguez is a 59 y.o. female.    PMH HTN, HLD, DM  ----  CARDIAC STUDIES  ----  2d echo 4/21/2022    Left Ventricle: Normal left ventricular systolic function with a visually estimated EF of 55 - 60%. Left ventricle size is normal. Normal wall thickness. Normal wall motion. Normal diastolic function.    Technical qualifiers: Echo study was technically difficult due to patient's body habitus.  ----  2d echo 11/6/2019  · Left Ventricle: Normal cavity size, wall thickness, systolic function (ejection fraction normal) and diastolic function. Estimated left ventricular ejection fraction is 56 - 60%.  · Right Ventricle: Normal right ventricular size and function.  · Mitral Valve: Mild mitral valve regurgitation is present.  · Tricuspid Valve: Mild tricuspid valve regurgitation is present.  · Pulmonary Artery: There is no evidence of pulmonary hypertension.  ----  Vascular duplex lower extremity left 9/27/2018  · No evidence of acute deep vein thrombosis in the left common femoral, superficial femoral, popliteal, posterior tibial, and peroneal veins. The veins were imaged in the transverse and longitudinal planes. The vessels showed normal color filling and compressibility. Doppler interrogation showed phasic and spontaneous flow.   ----    Patient states that no shortness of breath, states that stays fatigued.  Denies orthopnea, denies PND, states that she does gasp waking up.        Family History   Problem Relation Age of Onset    Stroke Brother     Heart Disease Maternal Aunt         chf    Hypertension Father     Glaucoma Mother     Heart Failure Maternal Aunt         MI    Colon Cancer Other     Stroke Other     Heart Failure Other         MI    Diabetes Maternal Uncle     Hypertension Mother        Past Medical History:   Diagnosis Date    Allergy     Anxiety 4/24/2010    Essential hypertension     HTN (hypertension) 4/24/2010    Hyperlipemia 4/24/2010    Hypokalemia 4/24/2010

## 2024-08-28 ENCOUNTER — OFFICE VISIT (OUTPATIENT)
Age: 59
End: 2024-08-28
Payer: COMMERCIAL

## 2024-08-28 VITALS
HEART RATE: 111 BPM | BODY MASS INDEX: 48.21 KG/M2 | DIASTOLIC BLOOD PRESSURE: 80 MMHG | SYSTOLIC BLOOD PRESSURE: 152 MMHG | WEIGHT: 262 LBS | HEIGHT: 62 IN

## 2024-08-28 DIAGNOSIS — I10 ESSENTIAL HYPERTENSION: ICD-10-CM

## 2024-08-28 DIAGNOSIS — E11.69 HYPERLIPIDEMIA ASSOCIATED WITH TYPE 2 DIABETES MELLITUS (HCC): ICD-10-CM

## 2024-08-28 DIAGNOSIS — E78.5 HYPERLIPIDEMIA ASSOCIATED WITH TYPE 2 DIABETES MELLITUS (HCC): ICD-10-CM

## 2024-08-28 DIAGNOSIS — R00.0 TACHYCARDIA: Primary | ICD-10-CM

## 2024-08-28 PROCEDURE — 3044F HG A1C LEVEL LT 7.0%: CPT | Performed by: INTERNAL MEDICINE

## 2024-08-28 PROCEDURE — 2022F DILAT RTA XM EVC RTNOPTHY: CPT | Performed by: INTERNAL MEDICINE

## 2024-08-28 PROCEDURE — 3017F COLORECTAL CA SCREEN DOC REV: CPT | Performed by: INTERNAL MEDICINE

## 2024-08-28 PROCEDURE — G8417 CALC BMI ABV UP PARAM F/U: HCPCS | Performed by: INTERNAL MEDICINE

## 2024-08-28 PROCEDURE — 1036F TOBACCO NON-USER: CPT | Performed by: INTERNAL MEDICINE

## 2024-08-28 PROCEDURE — 3077F SYST BP >= 140 MM HG: CPT | Performed by: INTERNAL MEDICINE

## 2024-08-28 PROCEDURE — 99214 OFFICE O/P EST MOD 30 MIN: CPT | Performed by: INTERNAL MEDICINE

## 2024-08-28 PROCEDURE — G8427 DOCREV CUR MEDS BY ELIG CLIN: HCPCS | Performed by: INTERNAL MEDICINE

## 2024-08-28 PROCEDURE — 3079F DIAST BP 80-89 MM HG: CPT | Performed by: INTERNAL MEDICINE

## 2024-08-28 RX ORDER — ATORVASTATIN CALCIUM 40 MG/1
40 TABLET, FILM COATED ORAL DAILY
Qty: 30 TABLET | Refills: 5 | Status: SHIPPED | OUTPATIENT
Start: 2024-08-28

## 2024-08-28 RX ORDER — METOPROLOL SUCCINATE 50 MG/1
TABLET, EXTENDED RELEASE ORAL
Qty: 30 TABLET | Refills: 5 | Status: SHIPPED | OUTPATIENT
Start: 2024-08-28

## 2024-08-28 ASSESSMENT — ENCOUNTER SYMPTOMS
NAUSEA: 0
APNEA: 0
CONSTIPATION: 0
DIARRHEA: 0
CHEST TIGHTNESS: 0
COUGH: 0
ABDOMINAL PAIN: 0
SHORTNESS OF BREATH: 0
BLOOD IN STOOL: 0
WHEEZING: 0
COLOR CHANGE: 0

## 2024-08-28 NOTE — PATIENT INSTRUCTIONS
Learning About the Mediterranean Diet  What is the Mediterranean diet?     The Mediterranean diet is a style of eating rather than a diet plan. It features foods eaten in [place], [place], [place], and other countries along the [place]. It emphasizes eating foods like fish, fruits, vegetables, beans, high-fiber breads and whole grains, nuts, and olive oil. This style of eating includes limited red meat, cheese, and sweets.  Why choose the Mediterranean diet?  A Mediterranean-style diet may improve heart health. It contains more fat than other heart-healthy diets. But the fats are mainly from nuts, unsaturated oils (such as fish oils and olive oil), and certain nut or seed oils (such as canola, soybean, or flaxseed oil). These fats may help protect the heart and blood vessels.  How can you get started on the Mediterranean diet?  Here are some things you can do to switch to a more Mediterranean way of eating.  What to eat  Eat a variety of fruits and vegetables each day, such as grapes, blueberries, tomatoes, broccoli, peppers, figs, olives, spinach, eggplant, beans, lentils, and chickpeas.  Eat a variety of whole-grain foods each day, such as oats, brown rice, and whole wheat bread, pasta, and couscous.  Eat fish at least 2 times a week. Try tuna, salmon, mackerel, lake trout, herring, or sardines.  Eat moderate amounts of low-fat dairy products, such as milk, cheese, or yogurt.  Eat moderate amounts of poultry and eggs.  Choose healthy (unsaturated) fats, such as nuts, olive oil, and certain nut or seed oils like canola, soybean, and flaxseed.  Limit unhealthy (saturated) fats, such as butter, palm oil, and coconut oil. And limit fats found in animal products, such as meat and dairy products made with whole milk. Try to eat red meat only a few times a month in very small amounts.  Limit sweets and desserts to only a few times a week. This includes sugar-sweetened drinks like soda.  The  Mediterranean diet may also include red wine with your meal--1 glass each day for women and up to 2 glasses a day for men.  Tips for eating at home  Use herbs, spices, garlic, lemon zest, and citrus juice instead of salt to add flavor to foods.  Add avocado slices to your sandwich instead of elizondo.  Have fish for lunch or dinner instead of red meat. Brush the fish with olive oil, and broil or grill it.  Sprinkle your salad with seeds or nuts instead of cheese.  Cook with olive or canola oil instead of butter or oils that are high in saturated fat.  Switch from 2% milk or whole milk to 1% or fat-free milk.  Dip raw vegetables in a vinaigrette dressing or hummus instead of dips made from mayonnaise or sour cream.  Have a piece of fruit for dessert instead of a piece of cake. Try baked apples, or have some dried fruit.  Tips for eating out  Try broiled, grilled, baked, or poached fish instead of having it fried or breaded.  Ask your  to have your meals prepared with olive oil instead of butter.  Order dishes made with marinara sauce or sauces made from olive oil. Avoid sauces made from cream or mayonnaise.  Choose whole-grain breads, whole wheat pasta and pizza crust, brown rice, beans, and lentils.  Cut back on butter or margarine on bread. Instead, you can dip your bread in a small amount of olive oil.  Ask for a side salad or grilled vegetables instead of french fries or chips.  Where can you learn more?  Go to https://www.[employer].net/patientEd and enter O407 to learn more about \"Learning About the Mediterranean Diet.\"  Current as of: May 9, 2022               Content Version: 13.5  © 1949-6271 [employer].   Care instructions adapted under license by [employer]. If you have questions about a medical condition or this instruction, always ask your healthcare professional. [employer] disclaims any warranty or liability for your use of this information.